# Patient Record
Sex: FEMALE | Race: BLACK OR AFRICAN AMERICAN | NOT HISPANIC OR LATINO | Employment: FULL TIME | URBAN - METROPOLITAN AREA
[De-identification: names, ages, dates, MRNs, and addresses within clinical notes are randomized per-mention and may not be internally consistent; named-entity substitution may affect disease eponyms.]

---

## 2017-07-02 ENCOUNTER — HOSPITAL ENCOUNTER (EMERGENCY)
Facility: OTHER | Age: 27
Discharge: HOME | End: 2017-07-02
Attending: EMERGENCY MEDICINE
Payer: COMMERCIAL

## 2017-07-02 VITALS
RESPIRATION RATE: 17 BRPM | HEART RATE: 74 BPM | DIASTOLIC BLOOD PRESSURE: 68 MMHG | HEIGHT: 62 IN | SYSTOLIC BLOOD PRESSURE: 112 MMHG | BODY MASS INDEX: 32.76 KG/M2 | WEIGHT: 178 LBS | OXYGEN SATURATION: 100 % | TEMPERATURE: 98 F

## 2017-07-02 DIAGNOSIS — N93.9 VAGINAL SPOTTING: ICD-10-CM

## 2017-07-02 DIAGNOSIS — R10.9 ABDOMINAL CRAMPING: ICD-10-CM

## 2017-07-02 DIAGNOSIS — O20.0 THREATENED MISCARRIAGE IN EARLY PREGNANCY: Primary | ICD-10-CM

## 2017-07-02 LAB
ABO + RH BLD: NORMAL
ALBUMIN SERPL BCP-MCNC: 3.9 G/DL
ALP SERPL-CCNC: 82 U/L
ALT SERPL W/O P-5'-P-CCNC: 12 U/L
ANION GAP SERPL CALC-SCNC: 10 MMOL/L
AST SERPL-CCNC: 13 U/L
B-HCG UR QL: POSITIVE
BACTERIA #/AREA URNS HPF: ABNORMAL /HPF
BACTERIA GENITAL QL WET PREP: ABNORMAL
BASOPHILS # BLD AUTO: 0.01 K/UL
BASOPHILS NFR BLD: 0.1 %
BILIRUB SERPL-MCNC: 0.3 MG/DL
BILIRUB UR QL STRIP: NEGATIVE
BLD GP AB SCN CELLS X3 SERPL QL: NORMAL
BUN SERPL-MCNC: 9 MG/DL
CALCIUM SERPL-MCNC: 9 MG/DL
CHLORIDE SERPL-SCNC: 105 MMOL/L
CLARITY UR: ABNORMAL
CLUE CELLS VAG QL WET PREP: ABNORMAL
CO2 SERPL-SCNC: 24 MMOL/L
COLOR UR: YELLOW
CREAT SERPL-MCNC: 0.7 MG/DL
CTP QC/QA: YES
DIFFERENTIAL METHOD: ABNORMAL
EOSINOPHIL # BLD AUTO: 0.1 K/UL
EOSINOPHIL NFR BLD: 0.6 %
ERYTHROCYTE [DISTWIDTH] IN BLOOD BY AUTOMATED COUNT: 12.5 %
EST. GFR  (AFRICAN AMERICAN): >60 ML/MIN/1.73 M^2
EST. GFR  (NON AFRICAN AMERICAN): >60 ML/MIN/1.73 M^2
FILAMENT FUNGI VAG WET PREP-#/AREA: ABNORMAL
GLUCOSE SERPL-MCNC: 72 MG/DL
GLUCOSE UR QL STRIP: NEGATIVE
HCG INTACT+B SERPL-ACNC: 2605 MIU/ML
HCT VFR BLD AUTO: 34.9 %
HGB BLD-MCNC: 11.7 G/DL
HGB UR QL STRIP: ABNORMAL
KETONES UR QL STRIP: NEGATIVE
LEUKOCYTE ESTERASE UR QL STRIP: ABNORMAL
LYMPHOCYTES # BLD AUTO: 2.8 K/UL
LYMPHOCYTES NFR BLD: 32.5 %
MCH RBC QN AUTO: 29.5 PG
MCHC RBC AUTO-ENTMCNC: 33.5 %
MCV RBC AUTO: 88 FL
MICROSCOPIC COMMENT: ABNORMAL
MONOCYTES # BLD AUTO: 0.4 K/UL
MONOCYTES NFR BLD: 4.5 %
NEUTROPHILS # BLD AUTO: 5.3 K/UL
NEUTROPHILS NFR BLD: 62.1 %
NITRITE UR QL STRIP: NEGATIVE
PH UR STRIP: 6 [PH] (ref 5–8)
PLATELET # BLD AUTO: 311 K/UL
PMV BLD AUTO: 9.6 FL
POTASSIUM SERPL-SCNC: 3.9 MMOL/L
PROT SERPL-MCNC: 7.6 G/DL
PROT UR QL STRIP: NEGATIVE
RBC # BLD AUTO: 3.96 M/UL
RBC #/AREA URNS HPF: 1 /HPF (ref 0–4)
SODIUM SERPL-SCNC: 139 MMOL/L
SP GR UR STRIP: 1.02 (ref 1–1.03)
SPECIMEN SOURCE: ABNORMAL
SQUAMOUS #/AREA URNS HPF: 5 /HPF
T VAGINALIS GENITAL QL WET PREP: ABNORMAL
URN SPEC COLLECT METH UR: ABNORMAL
UROBILINOGEN UR STRIP-ACNC: NEGATIVE EU/DL
WBC # BLD AUTO: 8.53 K/UL
WBC #/AREA URNS HPF: 7 /HPF (ref 0–5)
WBC #/AREA VAG WET PREP: ABNORMAL
YEAST GENITAL QL WET PREP: ABNORMAL

## 2017-07-02 PROCEDURE — 87591 N.GONORRHOEAE DNA AMP PROB: CPT

## 2017-07-02 PROCEDURE — 99284 EMERGENCY DEPT VISIT MOD MDM: CPT | Mod: 25

## 2017-07-02 PROCEDURE — 81025 URINE PREGNANCY TEST: CPT | Performed by: EMERGENCY MEDICINE

## 2017-07-02 PROCEDURE — 80053 COMPREHEN METABOLIC PANEL: CPT

## 2017-07-02 PROCEDURE — 85025 COMPLETE CBC W/AUTO DIFF WBC: CPT

## 2017-07-02 PROCEDURE — 86900 BLOOD TYPING SEROLOGIC ABO: CPT

## 2017-07-02 PROCEDURE — 87210 SMEAR WET MOUNT SALINE/INK: CPT

## 2017-07-02 PROCEDURE — 84702 CHORIONIC GONADOTROPIN TEST: CPT

## 2017-07-02 PROCEDURE — 81000 URINALYSIS NONAUTO W/SCOPE: CPT

## 2017-07-02 PROCEDURE — 86901 BLOOD TYPING SEROLOGIC RH(D): CPT

## 2017-07-02 NOTE — ED NOTES
Pt found out she was pregnant Monday home UPT. Started with vaginal spotting and abd cramping yesterday. Unknown gestation time. NAD. Will cont to monitor.

## 2017-07-03 LAB
C TRACH DNA SPEC QL NAA+PROBE: NOT DETECTED
N GONORRHOEA DNA SPEC QL NAA+PROBE: NOT DETECTED

## 2017-07-03 NOTE — ED PROVIDER NOTES
"Encounter Date: 2017       History     Chief Complaint   Patient presents with    Vaginal Bleeding     " I just found out I was pregnant on Monday by a home pregnancy test. I started havign a little bit of bright red spotting this morninng". Pt reports + intermittent bilateral lower abd "cramping" x 1 week. Denies N/V/d dysuira or vgainal discharge at this time     Patient is 27-year-old female  with last menstrual period  positive home pregnancy test 6 days prior to arrival.  She reports yesterday developing flights light pink vaginal spotting.  She also later developed mild abdominal cramping.  She admits that cramping has been persistent and somewhat worse today and is now constant. She denies worsening vaginal bleeding. She reports no passage of clots or tissue. She denies fever, chills, nausea, vomiting, chest pain or SOB. She is currently unaccompanied in the ER.           Review of patient's allergies indicates:  No Known Allergies  Past Medical History:   Diagnosis Date    Asthma      Past Surgical History:   Procedure Laterality Date     SECTION       Family History   Problem Relation Age of Onset    Diabetes Mother      Social History   Substance Use Topics    Smoking status: Never Smoker    Smokeless tobacco: Never Used    Alcohol use No     Review of Systems   Constitutional: Negative for fever.   HENT: Negative for sore throat.    Respiratory: Negative for shortness of breath.    Cardiovascular: Negative for chest pain.   Gastrointestinal: Negative for nausea and vomiting.        Abdominal cramping    Genitourinary: Positive for vaginal bleeding. Negative for dysuria.   Musculoskeletal: Negative for back pain.   Skin: Negative for rash.   Neurological: Negative for weakness.   Hematological: Does not bruise/bleed easily.       Physical Exam     Initial Vitals [17 1814]   BP Pulse Resp Temp SpO2   109/60 80 16 98.1 °F (36.7 °C) 100 %      MAP       76.33     "     Physical Exam    Nursing note and vitals reviewed.  Constitutional: She appears well-developed and well-nourished.   healthy appearing female in no acute distress or apparent pain but does appear anxious during interview and exam.  She makes good eye contact, speaks in clear full sentences and ambulates with ease.   HENT:   Head: Normocephalic and atraumatic.   Eyes: Conjunctivae and EOM are normal. Pupils are equal, round, and reactive to light. Right eye exhibits no discharge. Left eye exhibits no discharge. No scleral icterus.   Neck: Normal range of motion.   Cardiovascular: Normal rate, regular rhythm, normal heart sounds and intact distal pulses. Exam reveals no gallop and no friction rub.    No murmur heard.  Pulmonary/Chest: Breath sounds normal. She has no wheezes. She has no rhonchi. She has no rales.   Abdominal: Soft. Bowel sounds are normal. There is no tenderness. There is no rebound and no guarding.   Genitourinary:   Genitourinary Comments: Pelvic exam reveals scant thin white discharge with bleeding.  Cervical os is closed.  No cervical lesions appreciated.  No external lesions appreciated.  No uterine adnexal or CMT on bimanual exam.   Musculoskeletal: Normal range of motion. She exhibits no edema or tenderness.   Lymphadenopathy:     She has no cervical adenopathy.   Neurological: She is alert and oriented to person, place, and time. She has normal strength. No cranial nerve deficit or sensory deficit.   Skin: Skin is warm. Capillary refill takes less than 2 seconds. No rash and no abscess noted. No erythema.   Psychiatric: She has a normal mood and affect. Her behavior is normal. Thought content normal.         ED Course   Procedures  Labs Reviewed   POCT URINE PREGNANCY - Abnormal; Notable for the following:        Result Value    POC Preg Test, Ur Positive (*)     All other components within normal limits   C. TRACHOMATIS/N. GONORRHOEAE BY AMP DNA   URINALYSIS   URINALYSIS MICROSCOPIC   CBC  W/ AUTO DIFFERENTIAL   COMPREHENSIVE METABOLIC PANEL   HCG, QUANTITATIVE, PREGNANCY   VAGINAL SCREEN   POCT URINE PREGNANCY   TYPE & SCREEN        Imaging Results          US OB Less Than 14 Wks with Transvag(xpd (Final result)  Result time 07/02/17 20:23:50    Final result by Keeley Head MD (07/02/17 20:23:50)                 Impression:        Single intrauterine small gestational sac with an estimated age of 5 weeks and zero days. No yolk sac or fetal pole identified, which may be secondary to very early gestation. Followup with serial beta hCG and pelvic ultrasound as clinically warranted.      Electronically signed by: KEELEY HEAD MD, MD  Date:     07/02/17  Time:    20:23              Narrative:    Comparison: None.    Findings: Transabdominal and transvaginal pelvic ultrasound performed.  Beta-hCG not available for review at time of interpretation. The uterus measures 10.4 cm in length and 4.7 x 5.5 cm in transverse dimensions.  There is a single hypoechoic structure with decidual reaction identified within the upper uterine cavity suggestive of a gestational sac with a mean diameter of 5 mm, consistent with 5 weeks and zero days. No yolk sac or fetal pole identified. No extrauterine gestational sac seen. No discrete uterine fibroids identified. Small nabothian cysts noted. Right ovary was not identified. No right adnexal mass seen.  The left ovary measures 2.4 x 1.9 x 1.9 cm with intact arterial and venous flow. No significant amount of free fluid identified.                                 Medical Decision Making:   ED Management:  Urgent evaluation a 27-year-old female who presents with complaints of spotting and cramping in early pregnancy. She is afebrile, nontoxic appearing, hemodynamically stable.  Physical exam reveals benign abdomen with normal cardiopulmonary auscultation no focal neuro deficits.  Pelvic exam reveals closed os with no bleeding and no uterine, adnexal or cervical motion  tenderness to palpation on bimanual exam.diagnostic labs reveas lno leukocytosis, non acute anemia at 11 and 34, no acute electrolyte abnormalities. Beta hCG is 2/6/05. Blood type is A positive. Urinalysis unremarkable for infection. Transvaginal ultrasound reveals single intrauterine gestation estimated approximately 5 weeks 0 days with no yolk sac or fetal pole identified.  This is likely second to very early pregnancy.  Serial beta hCGs and pelvic ultrasound recommended is clinically warranted in the outpatient setting.  Patient is felt safe for discharge with instruction to follow-up with OB/GYN in one to 2 days for symptom recheck.  She is educated on signs and symptoms of worsening and is told if these present she should return to the ER.  She is placed on bleeding precautions and pelvic rest.  She verbalizes understanding and is amenable to plan.  Case is discussed with attending who agrees with plan.                   ED Course     Clinical Impression:   The primary encounter diagnosis was Threatened miscarriage in early pregnancy. Diagnoses of Vaginal spotting and Abdominal cramping were also pertinent to this visit.                           Alecia Lewis PA-C  07/02/17 4049

## 2017-07-03 NOTE — ED NOTES
Pt rounding complete. Pain 6/10. Patient states she took Tylenol earlier today (this morning) without relief. Restroom and comfort needs addressed. Pt updated on plan of care. Will continue to monitor.

## 2017-07-03 NOTE — ED NOTES
Rounding on the patient has been done. Pt is AAOx 4, no acute distress noted, respirations even, unlabored, vital signs stable , skin warm and dry. Pain was assessed and is currently a pain level 6/10. Comfort positioning and restroom needs were addressed. She denies any needs. She complains of pelvic cramping. The patient has been updated on the plan of care and current status. The call bell is within reach with instructions of usage for any additional patient needs. The patient is resting comfortably in stretcher with wheels locked and bed in lowest position. Will continue to monitor.

## 2017-07-05 ENCOUNTER — OFFICE VISIT (OUTPATIENT)
Dept: OBSTETRICS AND GYNECOLOGY | Facility: CLINIC | Age: 27
End: 2017-07-05
Payer: COMMERCIAL

## 2017-07-05 VITALS
WEIGHT: 185.44 LBS | HEIGHT: 62 IN | BODY MASS INDEX: 34.12 KG/M2 | DIASTOLIC BLOOD PRESSURE: 78 MMHG | SYSTOLIC BLOOD PRESSURE: 102 MMHG

## 2017-07-05 DIAGNOSIS — Z98.891 HISTORY OF C-SECTION: ICD-10-CM

## 2017-07-05 DIAGNOSIS — Z34.80 SUPERVISION OF OTHER NORMAL PREGNANCY: Primary | ICD-10-CM

## 2017-07-05 PROCEDURE — 99999 PR PBB SHADOW E&M-EST. PATIENT-LVL II: CPT | Mod: PBBFAC,,, | Performed by: OBSTETRICS & GYNECOLOGY

## 2017-07-05 PROCEDURE — 0502F SUBSEQUENT PRENATAL CARE: CPT | Mod: S$GLB,,, | Performed by: OBSTETRICS & GYNECOLOGY

## 2017-07-13 RX ORDER — ONDANSETRON 4 MG/1
4 TABLET, ORALLY DISINTEGRATING ORAL EVERY 6 HOURS PRN
Qty: 30 TABLET | Refills: 2 | Status: SHIPPED | OUTPATIENT
Start: 2017-07-13 | End: 2017-08-18

## 2017-07-13 NOTE — TELEPHONE ENCOUNTER
Patient is early pregnant, has complaints of nausea and vomiting. Requesting something to help with the nausea.    Rx of Zofran has been called in. Please sign order.

## 2017-07-13 NOTE — TELEPHONE ENCOUNTER
----- Message from Aviva Pinto sent at 7/13/2017  1:45 PM CDT -----  Contact: [phone]  Patient requesting to speak with you regarding nausea medication. Please advise.

## 2017-07-31 ENCOUNTER — LAB VISIT (OUTPATIENT)
Dept: LAB | Facility: HOSPITAL | Age: 27
End: 2017-07-31
Attending: OBSTETRICS & GYNECOLOGY
Payer: COMMERCIAL

## 2017-07-31 ENCOUNTER — ROUTINE PRENATAL (OUTPATIENT)
Dept: OBSTETRICS AND GYNECOLOGY | Facility: CLINIC | Age: 27
End: 2017-07-31
Payer: COMMERCIAL

## 2017-07-31 ENCOUNTER — OFFICE VISIT (OUTPATIENT)
Dept: OBSTETRICS AND GYNECOLOGY | Facility: CLINIC | Age: 27
End: 2017-07-31
Payer: COMMERCIAL

## 2017-07-31 VITALS — WEIGHT: 186.5 LBS | DIASTOLIC BLOOD PRESSURE: 60 MMHG | BODY MASS INDEX: 34.11 KG/M2 | SYSTOLIC BLOOD PRESSURE: 100 MMHG

## 2017-07-31 DIAGNOSIS — Z34.80 SUPERVISION OF OTHER NORMAL PREGNANCY: ICD-10-CM

## 2017-07-31 DIAGNOSIS — O21.9 NAUSEA AND VOMITING IN PREGNANCY: ICD-10-CM

## 2017-07-31 DIAGNOSIS — Z36.87 UNSURE OF LMP (LAST MENSTRUAL PERIOD) AS REASON FOR ULTRASOUND SCAN: Primary | ICD-10-CM

## 2017-07-31 DIAGNOSIS — Z98.891 HISTORY OF C-SECTION: ICD-10-CM

## 2017-07-31 LAB
ABO + RH BLD: NORMAL
BASOPHILS # BLD AUTO: 0.02 K/UL
BASOPHILS NFR BLD: 0.2 %
BLD GP AB SCN CELLS X3 SERPL QL: NORMAL
DIFFERENTIAL METHOD: ABNORMAL
EOSINOPHIL # BLD AUTO: 0.1 K/UL
EOSINOPHIL NFR BLD: 0.6 %
ERYTHROCYTE [DISTWIDTH] IN BLOOD BY AUTOMATED COUNT: 12.9 %
HCT VFR BLD AUTO: 34.6 %
HGB BLD-MCNC: 11.5 G/DL
HGB S BLD QL SOLY: NEGATIVE
LYMPHOCYTES # BLD AUTO: 1.7 K/UL
LYMPHOCYTES NFR BLD: 17.6 %
MCH RBC QN AUTO: 28.7 PG
MCHC RBC AUTO-ENTMCNC: 33.2 G/DL
MCV RBC AUTO: 86 FL
MONOCYTES # BLD AUTO: 0.6 K/UL
MONOCYTES NFR BLD: 5.7 %
NEUTROPHILS # BLD AUTO: 7.4 K/UL
NEUTROPHILS NFR BLD: 75.7 %
PLATELET # BLD AUTO: 304 K/UL
PMV BLD AUTO: 9.5 FL
RBC # BLD AUTO: 4.01 M/UL
WBC # BLD AUTO: 9.78 K/UL

## 2017-07-31 PROCEDURE — 86592 SYPHILIS TEST NON-TREP QUAL: CPT

## 2017-07-31 PROCEDURE — 87340 HEPATITIS B SURFACE AG IA: CPT

## 2017-07-31 PROCEDURE — 86900 BLOOD TYPING SEROLOGIC ABO: CPT

## 2017-07-31 PROCEDURE — 36415 COLL VENOUS BLD VENIPUNCTURE: CPT

## 2017-07-31 PROCEDURE — 85025 COMPLETE CBC W/AUTO DIFF WBC: CPT

## 2017-07-31 PROCEDURE — 76801 OB US < 14 WKS SINGLE FETUS: CPT | Mod: S$GLB,,, | Performed by: OBSTETRICS & GYNECOLOGY

## 2017-07-31 PROCEDURE — 85660 RBC SICKLE CELL TEST: CPT

## 2017-07-31 PROCEDURE — 86703 HIV-1/HIV-2 1 RESULT ANTBDY: CPT

## 2017-07-31 PROCEDURE — 99999 PR PBB SHADOW E&M-EST. PATIENT-LVL II: CPT | Mod: PBBFAC,,, | Performed by: OBSTETRICS & GYNECOLOGY

## 2017-07-31 PROCEDURE — 81220 CFTR GENE COM VARIANTS: CPT

## 2017-07-31 PROCEDURE — 0502F SUBSEQUENT PRENATAL CARE: CPT | Mod: S$GLB,,, | Performed by: OBSTETRICS & GYNECOLOGY

## 2017-07-31 PROCEDURE — 86850 RBC ANTIBODY SCREEN: CPT

## 2017-07-31 PROCEDURE — 86762 RUBELLA ANTIBODY: CPT

## 2017-07-31 RX ORDER — PROMETHAZINE HYDROCHLORIDE 25 MG/1
25 SUPPOSITORY RECTAL EVERY 6 HOURS PRN
Qty: 12 SUPPOSITORY | Refills: 3 | Status: SHIPPED | OUTPATIENT
Start: 2017-07-31 | End: 2017-08-18

## 2017-07-31 NOTE — PROGRESS NOTES
U/S confirms viability; having cramping but not bleeding; n&v excessive---note for work; diet recommendations; phenergan suppository (failed zofran)  Labs today.

## 2017-08-01 LAB
HBV SURFACE AG SERPL QL IA: NEGATIVE
HIV 1+2 AB+HIV1 P24 AG SERPL QL IA: NEGATIVE
RPR SER QL: NORMAL
RUBV IGG SER-ACNC: 53.3 IU/ML
RUBV IGG SER-IMP: REACTIVE

## 2017-08-02 LAB — CFTR MUT ANL BLD/T: NORMAL

## 2017-08-04 ENCOUNTER — TELEPHONE (OUTPATIENT)
Dept: OBSTETRICS AND GYNECOLOGY | Facility: CLINIC | Age: 27
End: 2017-08-04

## 2017-08-10 NOTE — TELEPHONE ENCOUNTER
----- Message from Hayley Crespo sent at 8/10/2017  9:40 AM CDT -----  Contact: self, [phone]  Patient requests her nausea medication refill sent to pharmacy. Please advise.

## 2017-08-10 NOTE — TELEPHONE ENCOUNTER
Patient has complaints of nausea. Zofran did not help and she prefers not to use the suppository.   Rx of Promethazine tablet called in to the pharmacy.    Please sign order.

## 2017-08-11 RX ORDER — PROMETHAZINE HYDROCHLORIDE 25 MG/1
25 TABLET ORAL EVERY 6 HOURS PRN
Qty: 60 TABLET | Refills: 1 | Status: SHIPPED | OUTPATIENT
Start: 2017-08-11 | End: 2017-08-18

## 2017-08-14 ENCOUNTER — TELEPHONE (OUTPATIENT)
Dept: OBSTETRICS AND GYNECOLOGY | Facility: CLINIC | Age: 27
End: 2017-08-14

## 2017-08-14 NOTE — TELEPHONE ENCOUNTER
[place] with [employer] called stating a form was faxed for completion stating why the patient is unable to due to pregnancy  Also need prenatal office visit, labs or ultrasound done to be faxed with the medical form  Fax [phone]

## 2017-08-14 NOTE — TELEPHONE ENCOUNTER
----- Message from Michelle Franco sent at 8/14/2017  7:48 AM CDT -----  Contact: [phone] / self   Patient requesting to speak with you regarding her nausea medicine. States the medicine she was given is making her throw up and she wants to know if she should try the zofran instead. Please advise.

## 2017-08-14 NOTE — TELEPHONE ENCOUNTER
----- Message from Simi Stewart sent at 8/14/2017 12:56 PM CDT -----  Jurgen with [employer] called.   No. [phone]    Needs medical information to support the claim.   Fax no. [phone]

## 2017-08-14 NOTE — TELEPHONE ENCOUNTER
Patient informed if she is unable to tolerate the phenergan, yes okay to try the zofran.  Patient is to call with any further questions or problems

## 2017-08-15 ENCOUNTER — HOSPITAL ENCOUNTER (EMERGENCY)
Facility: OTHER | Age: 27
Discharge: HOME | End: 2017-08-15
Attending: EMERGENCY MEDICINE
Payer: COMMERCIAL

## 2017-08-15 VITALS
DIASTOLIC BLOOD PRESSURE: 53 MMHG | WEIGHT: 185 LBS | HEART RATE: 76 BPM | SYSTOLIC BLOOD PRESSURE: 107 MMHG | HEIGHT: 62 IN | BODY MASS INDEX: 34.04 KG/M2 | TEMPERATURE: 98 F | RESPIRATION RATE: 18 BRPM | OXYGEN SATURATION: 100 %

## 2017-08-15 DIAGNOSIS — O21.9 NAUSEA AND VOMITING IN PREGNANCY: Primary | ICD-10-CM

## 2017-08-15 DIAGNOSIS — N30.00 ACUTE CYSTITIS WITHOUT HEMATURIA: ICD-10-CM

## 2017-08-15 DIAGNOSIS — R80.9 PROTEINURIA, UNSPECIFIED: ICD-10-CM

## 2017-08-15 LAB
ANION GAP SERPL CALC-SCNC: 11 MMOL/L
B-HCG UR QL: POSITIVE
BACTERIA #/AREA URNS HPF: ABNORMAL /HPF
BASOPHILS # BLD AUTO: 0.01 K/UL
BASOPHILS NFR BLD: 0.1 %
BILIRUB UR QL STRIP: NEGATIVE
BUN SERPL-MCNC: 9 MG/DL
CALCIUM SERPL-MCNC: 9.3 MG/DL
CHLORIDE SERPL-SCNC: 103 MMOL/L
CLARITY UR: ABNORMAL
CO2 SERPL-SCNC: 22 MMOL/L
COLOR UR: YELLOW
CREAT SERPL-MCNC: 0.6 MG/DL
CTP QC/QA: YES
DIFFERENTIAL METHOD: ABNORMAL
EOSINOPHIL # BLD AUTO: 0 K/UL
EOSINOPHIL NFR BLD: 0.2 %
ERYTHROCYTE [DISTWIDTH] IN BLOOD BY AUTOMATED COUNT: 13.1 %
EST. GFR  (AFRICAN AMERICAN): >60 ML/MIN/1.73 M^2
EST. GFR  (NON AFRICAN AMERICAN): >60 ML/MIN/1.73 M^2
GLUCOSE SERPL-MCNC: 79 MG/DL
GLUCOSE UR QL STRIP: NEGATIVE
HCT VFR BLD AUTO: 33.6 %
HGB BLD-MCNC: 11.4 G/DL
HGB UR QL STRIP: ABNORMAL
HYALINE CASTS #/AREA URNS LPF: 1 /LPF
KETONES UR QL STRIP: ABNORMAL
LEUKOCYTE ESTERASE UR QL STRIP: ABNORMAL
LYMPHOCYTES # BLD AUTO: 1.5 K/UL
LYMPHOCYTES NFR BLD: 17 %
MCH RBC QN AUTO: 29.5 PG
MCHC RBC AUTO-ENTMCNC: 33.9 G/DL
MCV RBC AUTO: 87 FL
MICROSCOPIC COMMENT: ABNORMAL
MONOCYTES # BLD AUTO: 0.3 K/UL
MONOCYTES NFR BLD: 3.6 %
NEUTROPHILS # BLD AUTO: 6.8 K/UL
NEUTROPHILS NFR BLD: 79 %
NITRITE UR QL STRIP: NEGATIVE
NON-SQ EPI CELLS #/AREA URNS HPF: 4 /HPF
PH UR STRIP: 7 [PH] (ref 5–8)
PLATELET # BLD AUTO: 282 K/UL
PMV BLD AUTO: 9.4 FL
POTASSIUM SERPL-SCNC: 4.1 MMOL/L
PROT UR QL STRIP: ABNORMAL
RBC # BLD AUTO: 3.87 M/UL
RBC #/AREA URNS HPF: 5 /HPF (ref 0–4)
SODIUM SERPL-SCNC: 136 MMOL/L
SP GR UR STRIP: 1.02 (ref 1–1.03)
SQUAMOUS #/AREA URNS HPF: 2 /HPF
URN SPEC COLLECT METH UR: ABNORMAL
UROBILINOGEN UR STRIP-ACNC: 1 EU/DL
WBC # BLD AUTO: 8.58 K/UL
WBC #/AREA URNS HPF: 25 /HPF (ref 0–5)
WBC CLUMPS URNS QL MICRO: ABNORMAL

## 2017-08-15 PROCEDURE — 80048 BASIC METABOLIC PNL TOTAL CA: CPT

## 2017-08-15 PROCEDURE — 96361 HYDRATE IV INFUSION ADD-ON: CPT

## 2017-08-15 PROCEDURE — 87086 URINE CULTURE/COLONY COUNT: CPT

## 2017-08-15 PROCEDURE — 99284 EMERGENCY DEPT VISIT MOD MDM: CPT | Mod: 25

## 2017-08-15 PROCEDURE — 81025 URINE PREGNANCY TEST: CPT | Performed by: EMERGENCY MEDICINE

## 2017-08-15 PROCEDURE — 87077 CULTURE AEROBIC IDENTIFY: CPT

## 2017-08-15 PROCEDURE — 87186 SC STD MICRODIL/AGAR DIL: CPT

## 2017-08-15 PROCEDURE — 87088 URINE BACTERIA CULTURE: CPT

## 2017-08-15 PROCEDURE — 85025 COMPLETE CBC W/AUTO DIFF WBC: CPT

## 2017-08-15 PROCEDURE — 81000 URINALYSIS NONAUTO W/SCOPE: CPT

## 2017-08-15 PROCEDURE — 96374 THER/PROPH/DIAG INJ IV PUSH: CPT

## 2017-08-15 PROCEDURE — 63600175 PHARM REV CODE 636 W HCPCS: Performed by: EMERGENCY MEDICINE

## 2017-08-15 PROCEDURE — 25000003 PHARM REV CODE 250: Performed by: EMERGENCY MEDICINE

## 2017-08-15 RX ORDER — NITROFURANTOIN 25; 75 MG/1; MG/1
100 CAPSULE ORAL 2 TIMES DAILY
Qty: 10 CAPSULE | Refills: 0 | Status: SHIPPED | OUTPATIENT
Start: 2017-08-15 | End: 2017-08-20

## 2017-08-15 RX ORDER — ONDANSETRON 2 MG/ML
4 INJECTION INTRAMUSCULAR; INTRAVENOUS
Status: COMPLETED | OUTPATIENT
Start: 2017-08-15 | End: 2017-08-15

## 2017-08-15 RX ORDER — DOXYLAMINE SUCCINATE AND PYRIDOXINE HYDROCHLORIDE, DELAYED RELEASE TABLETS 10 MG/10 MG 10; 10 MG/1; MG/1
1 TABLET, DELAYED RELEASE ORAL NIGHTLY PRN
Qty: 10 TABLET | Refills: 0 | Status: SHIPPED | OUTPATIENT
Start: 2017-08-15 | End: 2019-05-22

## 2017-08-15 RX ADMIN — SODIUM CHLORIDE 1000 ML: 0.9 INJECTION, SOLUTION INTRAVENOUS at 12:08

## 2017-08-15 RX ADMIN — ONDANSETRON 4 MG: 2 INJECTION INTRAMUSCULAR; INTRAVENOUS at 12:08

## 2017-08-15 RX ADMIN — SODIUM CHLORIDE 1000 ML: 0.9 INJECTION, SOLUTION INTRAVENOUS at 01:08

## 2017-08-15 NOTE — ED PROVIDER NOTES
"Encounter Date: 8/15/2017    SCRIBE #1 NOTE: I, Johanny Charles, am scribing for, and in the presence of,  Dr. Luque. I have scribed the entire note.       History     Chief Complaint   Patient presents with    Hyperemesis Gravidarum     pt is 11 week pg.  pt vomiting even with meds.      Time seen by provider: 12:16 PM    This is a 27 y.o. pregnant female,  at approximately 11 weeks gestation, who presents with complaint of hyperemesis x 3 days. She notes that she has been unable to keep any food down for at least a day. Pt last threw up at 1.5hrs PTA. She has vomited 4 times this morning. She had similar symptoms with her last pregnancy and was treated in an ED with IV fluids. She was not admitted into a hospital. Pt was put on Phenergan last week for nausea without vomiting and notes onset of vomiting since. She states that the Rx makes her feel worse and "drowzy." She reports to alleviating factors. She denies any dysuria, urinary frequency, urinary urgency, vaginal bleeding, vaginal discharge, abdominal pain, diarrhea, weakness, fatigue, and back pain. Her OB/GYN is Dr. Hector Su. She does not smoke, drink, or use drugs. She has NKDA.      The history is provided by the patient.     Review of patient's allergies indicates:  No Known Allergies  Past Medical History:   Diagnosis Date    Asthma      Past Surgical History:   Procedure Laterality Date     SECTION       Family History   Problem Relation Age of Onset    Diabetes Mother      Social History   Substance Use Topics    Smoking status: Never Smoker    Smokeless tobacco: Never Used    Alcohol use No     Review of Systems   Constitutional: Negative for chills, diaphoresis, fatigue and fever.   HENT: Negative for ear pain and sore throat.    Eyes: Negative for pain and visual disturbance.   Respiratory: Negative for cough, shortness of breath and wheezing.    Cardiovascular: Negative for chest pain.   Gastrointestinal: Positive for " nausea and vomiting. Negative for abdominal pain and diarrhea.   Genitourinary: Negative for decreased urine volume, dysuria, frequency, hematuria, urgency, vaginal bleeding and vaginal discharge.   Musculoskeletal: Negative for back pain and neck pain.   Skin: Negative for color change, pallor, rash and wound.   Neurological: Negative for dizziness, weakness, light-headedness, numbness and headaches.   Hematological: Does not bruise/bleed easily.   Psychiatric/Behavioral: Negative for behavioral problems and confusion.       Physical Exam     Initial Vitals [08/15/17 1053]   BP Pulse Resp Temp SpO2   (!) 109/54 97 18 98.3 °F (36.8 °C) 99 %      MAP       72.33         Physical Exam    Nursing note and vitals reviewed.  Constitutional: She appears well-developed and well-nourished. She is not diaphoretic. No distress.   HENT:   Head: Normocephalic and atraumatic.   Right Ear: External ear normal.   Left Ear: External ear normal.   Oropharynx is clear and intact.  Moist mucous membranes.   Eyes: Conjunctivae and EOM are normal. Pupils are equal, round, and reactive to light. Right eye exhibits no discharge. Left eye exhibits no discharge.   Conjunctiva are pink, clear, and intact.   Neck: Normal range of motion. Neck supple.   Cardiovascular: Normal rate, regular rhythm and normal heart sounds.   Normal S1, S2. No murmurs, no rubs, no gallops.    Pulmonary/Chest: Breath sounds normal. No respiratory distress. She has no wheezes. She has no rhonchi. She has no rales.   Clear to ascultation bilaterally.   Abdominal: Soft. Bowel sounds are normal. She exhibits no distension. There is no tenderness. There is no rebound and no guarding.   No flank tenderness. No audible bruits.   Musculoskeletal: Normal range of motion. She exhibits no edema or tenderness.   Lymphadenopathy:     She has no cervical adenopathy.   Neurological: She is alert and oriented to person, place, and time. She has normal strength. No sensory  deficit.   Skin: Skin is warm and dry. No rash noted. No erythema.   Warm and dry. No rashes, no lesions, or skin tenting.   Psychiatric: She has a normal mood and affect. Her behavior is normal.         ED Course   Procedures  Labs Reviewed   CBC W/ AUTO DIFFERENTIAL - Abnormal; Notable for the following:        Result Value    RBC 3.87 (*)     Hemoglobin 11.4 (*)     Hematocrit 33.6 (*)     Gran% 79.0 (*)     Lymph% 17.0 (*)     Mono% 3.6 (*)     All other components within normal limits   BASIC METABOLIC PANEL - Abnormal; Notable for the following:     CO2 22 (*)     All other components within normal limits   URINALYSIS - Abnormal; Notable for the following:     Appearance, UA Hazy (*)     Protein, UA Trace (*)     Ketones, UA 1+ (*)     Occult Blood UA Trace (*)     Leukocytes, UA 1+ (*)     All other components within normal limits   URINALYSIS MICROSCOPIC - Abnormal; Notable for the following:     RBC, UA 5 (*)     WBC, UA 25 (*)     WBC Clumps, UA Occasional (*)     Bacteria, UA Moderate (*)     Non-Squam Epith 4 (*)     All other components within normal limits   POCT URINE PREGNANCY - Abnormal; Notable for the following:     POC Preg Test, Ur Positive (*)     All other components within normal limits   CULTURE, URINE   CULTURE, URINE             Medical Decision Making:   Clinical Tests:   Lab Tests: Ordered and Reviewed            Scribe Attestation:   Scribe #1: I performed the above scribed service and the documentation accurately describes the services I performed. I attest to the accuracy of the note.    Attending Attestation:           Physician Attestation for Scribe:  Physician Attestation Statement for Scribe #1: I, Dr. Luque, reviewed documentation, as scribed by Johanny Charles in my presence, and it is both accurate and complete.         Attending ED Notes:   Emergent evaluation of 27-year-old female with nausea and vomiting with pregnancy.  Patient is afebrile, nontoxic-appearing with  stable vital signs.  There is no elevation of white blood cell count.  H&H is 11.4 and 33.6.  No acute findings on BMP.  UPT is positive.  Urinary analysis reveals urinary tract infection.  Abdominal exam is benign.  No flank tenderness to palpation.  On reevaluation patient is tolerating by mouth intake without complication.  The patient is extensively counseled on her diagnosis and treatment including all diagnostic, laboratory and physical exam findings.  The patient is discharged in good condition and directed to follow-up with her PCP in the next 24-48 hours.          ED Course     Clinical Impression:     1. Nausea and vomiting in pregnancy    2. Acute cystitis without hematuria    3. Proteinuria, unspecified                                 Severino Yusuf MD  08/17/17 1165

## 2017-08-15 NOTE — ED TRIAGE NOTES
Pt reports that she is 11 weeks, c/o vomiting X 4 days with vaginal cramping. Pt c/o that she is unable to hold down fluids.

## 2017-08-15 NOTE — ED NOTES
Patient Identifiers for Siobhan Chappell checked and correct  LOC: The patient is awake, alert and aware of environment with an appropriate affect, the patient is oriented x 3 and speaking appropriate.  APPEARANCE: Patient resting comfortably and in no acute distress. The patient is clean and well groomed. The patient's clothing is properly fastened.  SKIN: The skin is warm and dry. The patient has normal skin turgor and moist mucus membranes. No rashes or lesions upon observation. Skin Intact , no breakdown noted.  Musculoskeletal :  Normal range of motion noted. Moves all extremities well.  RESPIRATORY: Airway is open and patent, respirations are spontaneous, patient has a normal effort and rate.   CARDIAC: Patient has a normal rate and rhythm, capillary refill < 3 seconds.   ABDOMEN: Soft and non tender to palpation, no distention observed. Bowels Sounds are WNL all quads.  PULSES: 2+ radial  pulses, symmetrical.      Will continue to monitor

## 2017-08-16 ENCOUNTER — TELEPHONE (OUTPATIENT)
Dept: OBSTETRICS AND GYNECOLOGY | Facility: CLINIC | Age: 27
End: 2017-08-16

## 2017-08-16 NOTE — TELEPHONE ENCOUNTER
Patient informed that paperwork was faxed back over today. All questions answered and patient verbalized.

## 2017-08-16 NOTE — TELEPHONE ENCOUNTER
----- Message from Aviva Pinto sent at 8/16/2017  3:34 PM CDT -----  Contact: [phone]/self  Patient requesting to speak with you regarding FMLA paperwork. Please advise.

## 2017-08-17 LAB — BACTERIA UR CULT: NORMAL

## 2017-08-18 ENCOUNTER — NURSE TRIAGE (OUTPATIENT)
Dept: ADMINISTRATIVE | Facility: CLINIC | Age: 27
End: 2017-08-18

## 2017-08-18 ENCOUNTER — HOSPITAL ENCOUNTER (EMERGENCY)
Facility: OTHER | Age: 27
Discharge: HOME | End: 2017-08-19
Attending: EMERGENCY MEDICINE
Payer: COMMERCIAL

## 2017-08-18 DIAGNOSIS — O21.9 NAUSEA AND VOMITING DURING PREGNANCY: Primary | ICD-10-CM

## 2017-08-18 DIAGNOSIS — R10.32 LLQ PAIN: ICD-10-CM

## 2017-08-18 LAB
BACTERIA #/AREA URNS HPF: ABNORMAL /HPF
BACTERIA GENITAL QL WET PREP: ABNORMAL
BILIRUB UR QL STRIP: NEGATIVE
CLARITY UR: CLEAR
CLUE CELLS VAG QL WET PREP: ABNORMAL
COLOR UR: YELLOW
FILAMENT FUNGI VAG WET PREP-#/AREA: ABNORMAL
GLUCOSE UR QL STRIP: NEGATIVE
HGB UR QL STRIP: NEGATIVE
HYALINE CASTS #/AREA URNS LPF: 1 /LPF
KETONES UR QL STRIP: NEGATIVE
LEUKOCYTE ESTERASE UR QL STRIP: NEGATIVE
MICROSCOPIC COMMENT: ABNORMAL
NITRITE UR QL STRIP: NEGATIVE
PH UR STRIP: 6 [PH] (ref 5–8)
PROT UR QL STRIP: ABNORMAL
RBC #/AREA URNS HPF: 0 /HPF (ref 0–4)
SP GR UR STRIP: 1.02 (ref 1–1.03)
SPECIMEN SOURCE: ABNORMAL
SQUAMOUS #/AREA URNS HPF: 8 /HPF
T VAGINALIS GENITAL QL WET PREP: ABNORMAL
URN SPEC COLLECT METH UR: ABNORMAL
UROBILINOGEN UR STRIP-ACNC: ABNORMAL EU/DL
WBC #/AREA URNS HPF: 7 /HPF (ref 0–5)
WBC #/AREA VAG WET PREP: ABNORMAL
YEAST GENITAL QL WET PREP: ABNORMAL

## 2017-08-18 PROCEDURE — 25000003 PHARM REV CODE 250: Performed by: EMERGENCY MEDICINE

## 2017-08-18 PROCEDURE — 63600175 PHARM REV CODE 636 W HCPCS: Performed by: EMERGENCY MEDICINE

## 2017-08-18 PROCEDURE — 87591 N.GONORRHOEAE DNA AMP PROB: CPT

## 2017-08-18 PROCEDURE — 81000 URINALYSIS NONAUTO W/SCOPE: CPT

## 2017-08-18 PROCEDURE — 87210 SMEAR WET MOUNT SALINE/INK: CPT

## 2017-08-18 PROCEDURE — 99284 EMERGENCY DEPT VISIT MOD MDM: CPT | Mod: 25

## 2017-08-18 PROCEDURE — 96361 HYDRATE IV INFUSION ADD-ON: CPT

## 2017-08-18 PROCEDURE — 96374 THER/PROPH/DIAG INJ IV PUSH: CPT

## 2017-08-18 RX ORDER — ONDANSETRON 2 MG/ML
4 INJECTION INTRAMUSCULAR; INTRAVENOUS
Status: COMPLETED | OUTPATIENT
Start: 2017-08-18 | End: 2017-08-18

## 2017-08-18 RX ORDER — SODIUM CHLORIDE 9 MG/ML
1000 INJECTION, SOLUTION INTRAVENOUS
Status: COMPLETED | OUTPATIENT
Start: 2017-08-18 | End: 2017-08-19

## 2017-08-18 RX ADMIN — ONDANSETRON 4 MG: 2 INJECTION INTRAMUSCULAR; INTRAVENOUS at 11:08

## 2017-08-18 RX ADMIN — SODIUM CHLORIDE 1000 ML: 0.9 INJECTION, SOLUTION INTRAVENOUS at 11:08

## 2017-08-19 VITALS
HEART RATE: 77 BPM | DIASTOLIC BLOOD PRESSURE: 51 MMHG | SYSTOLIC BLOOD PRESSURE: 104 MMHG | BODY MASS INDEX: 34.4 KG/M2 | WEIGHT: 186.94 LBS | TEMPERATURE: 98 F | HEIGHT: 62 IN | RESPIRATION RATE: 16 BRPM | OXYGEN SATURATION: 100 %

## 2017-08-19 LAB
C TRACH DNA SPEC QL NAA+PROBE: NOT DETECTED
N GONORRHOEA DNA SPEC QL NAA+PROBE: NOT DETECTED

## 2017-08-19 RX ORDER — ONDANSETRON 8 MG/1
8 TABLET, ORALLY DISINTEGRATING ORAL EVERY 6 HOURS PRN
Qty: 12 TABLET | Refills: 0 | Status: SHIPPED | OUTPATIENT
Start: 2017-08-19 | End: 2017-11-30 | Stop reason: SDUPTHER

## 2017-08-19 NOTE — ED PROVIDER NOTES
Encounter Date: 2017    SCRIBE #1 NOTE: I, Loren Bonner, am scribing for, and in the presence of,  Dr. Marquez. I have scribed the entire note.       History     Chief Complaint   Patient presents with    Emesis During Pregnancy     w/ LLQ abd pain since 1930 hrs this PM., 12 wks gestation, no vaginal bleeding     Time seen by provider: 10:45 PM    This is a 27 y.o.  female who is 12 weeks gestation who presents with complaint of vomiting. She reports onset of symptoms was about 3 hrs ago. The patient states she has been nauseated since 10 weeks gestation intermittently. She notes she was doing well for the last 3 days until tonight when the vomiting began. The patient notes associated left lower abdominal pain but denies any vaginal bleeding, vaginal discharge, urinary symptoms, fever or chills. She describes the pain as throbbing. The patient notes she typically has abdominal pain but the pain is usually described as cramping. She states she has been using Diclegis with minimal relief of symptoms. The patient reports she experienced similar symptoms with previous pregnancy      The history is provided by the patient.     Review of patient's allergies indicates:  No Known Allergies  Past Medical History:   Diagnosis Date    Asthma      Past Surgical History:   Procedure Laterality Date     SECTION       Family History   Problem Relation Age of Onset    Diabetes Mother      Social History   Substance Use Topics    Smoking status: Never Smoker    Smokeless tobacco: Never Used    Alcohol use No     Review of Systems   Constitutional: Negative for chills and fever.   HENT: Negative for congestion and sore throat.    Eyes: Negative for redness and visual disturbance.   Respiratory: Negative for cough and shortness of breath.    Cardiovascular: Negative for chest pain and palpitations.   Gastrointestinal: Positive for abdominal pain, nausea and vomiting. Negative for diarrhea.   Genitourinary:  Negative for dysuria.   Musculoskeletal: Negative for back pain.   Skin: Negative for rash.   Neurological: Negative for weakness and headaches.   Psychiatric/Behavioral: Negative for confusion.       Physical Exam     Initial Vitals [08/18/17 2235]   BP Pulse Resp Temp SpO2   (!) 116/56 89 18 98.3 °F (36.8 °C) 97 %      MAP       76         Physical Exam    Nursing note and vitals reviewed.  Constitutional: She appears well-developed and well-nourished. She is not diaphoretic. No distress.   HENT:   Head: Normocephalic and atraumatic.   Right Ear: External ear normal.   Left Ear: External ear normal.   Mouth/Throat: Oropharynx is clear and moist.   Eyes: Conjunctivae and EOM are normal.   Neck: Normal range of motion. Neck supple.   Cardiovascular: Normal rate, regular rhythm and normal heart sounds. Exam reveals no gallop and no friction rub.    No murmur heard.  Pulmonary/Chest: Breath sounds normal. She has no wheezes. She has no rhonchi. She has no rales.   Abdominal: Soft. Bowel sounds are normal. There is tenderness. There is no rebound and no guarding.   LLQ tenderness to deep palpation.    Genitourinary:   Genitourinary Comments: Normal external genitalia. Scant amount of whitish discharge. No CMT. Left adnexal tenderness.    Musculoskeletal: Normal range of motion. She exhibits no edema or tenderness.   No CVA tenderness   Lymphadenopathy:     She has no cervical adenopathy.   Neurological: She is alert and oriented to person, place, and time. She has normal strength.   Skin: Skin is warm and dry. No rash noted.         ED Course   Procedures  Labs Reviewed   URINALYSIS - Abnormal; Notable for the following:        Result Value    Protein, UA 1+ (*)     Urobilinogen, UA 2.0-3.0 (*)     All other components within normal limits   VAGINAL SCREEN - Abnormal; Notable for the following:     Clue Cells, Wet Prep Occasional (*)     WBC - Vaginal Screen Moderate (*)     Bacteria - Vaginal Screen Many (*)     All  other components within normal limits   URINALYSIS MICROSCOPIC - Abnormal; Notable for the following:     WBC, UA 7 (*)     Bacteria, UA Moderate (*)     All other components within normal limits   C. TRACHOMATIS/N. GONORRHOEAE BY AMP DNA             Medical Decision Making:   Clinical Tests:   Lab Tests: Ordered and Reviewed  Radiological Study: Ordered and Reviewed    Additional MDM:   Comments: 27-year-old female approximately 12 weeks pregnant presents complaining of nausea and vomiting that started this evening as well as left lower quadrant pain.  On exam she did have significant left lower quadrant tenderness to palpation.  In addition to IV fluids and Zofran the patient also obtained a pelvic ultrasound.  Pelvic ultrasound was significant only for an IUP at 11 weeks and 4 days.  Upon reassessment the patient appeared much more comfortable.  She stated the pain had resolved as did the nausea and vomiting.  She was able tolerate a by mouth challenge.  She was given a prescription for Zofran to supplement be likely just she is already on.  She was instructed to follow-up with her OB/GYN for reevaluation as needed if this persists.       Wet prep was obtained and significant for occasional clue cells.  The patient was discharged without a prescription for Flagyl.  She will be called to up-to-date on these results and prescription will be called to her pharmacy..          Scribe Attestation:   Scribe #1: I performed the above scribed service and the documentation accurately describes the services I performed. I attest to the accuracy of the note.    Attending Attestation:           Physician Attestation for Scribe:  Physician Attestation Statement for Scribe #1: I, Dr. Marquez, reviewed documentation, as scribed by Loren Bonner in my presence, and it is both accurate and complete.                 ED Course     Clinical Impression:     1. Nausea and vomiting during pregnancy    2. LLQ pain                                  Cira Marquez MD  08/19/17 0638    6:57 AM  Pt called and informed.  Rx called to [facility] [address].          Cira Marquez MD  08/19/17 0658

## 2017-08-19 NOTE — TELEPHONE ENCOUNTER
Reason for Disposition   [1] Abdominal pain AND [2] pregnant < 20 weeks   MODERATE-SEVERE abdominal pain (e.g., interferes with normal activities, awakens from sleep)    Protocols used: ST PREGNANCY - MORNING SICKNESS (NAUSEA AND VOMITING OF PREGNANCY)-[facility], ST PREGNANCY - ABDOMINAL PAIN LESS THAN 20 WEEKS EGA-A-    Siobhan is 3 months pregnant and reporting she has vomited 5x since 1930 and has not been able to hold down any fluids. She has taken the medications given to her for nausea with no relief. She is also stating that she has 6/10 abdominal pain that is constant. She does have some clear vaginal discharge when she was vomiting which she believes was urine. Denies other symptom as documented. Advised her to go to ED for evaluation and she agrees to plan. Please contact caller with any further care advice.

## 2017-08-28 ENCOUNTER — ROUTINE PRENATAL (OUTPATIENT)
Dept: OBSTETRICS AND GYNECOLOGY | Facility: CLINIC | Age: 27
End: 2017-08-28
Payer: COMMERCIAL

## 2017-08-28 VITALS — WEIGHT: 185.44 LBS | SYSTOLIC BLOOD PRESSURE: 98 MMHG | DIASTOLIC BLOOD PRESSURE: 62 MMHG | BODY MASS INDEX: 33.91 KG/M2

## 2017-08-28 DIAGNOSIS — Z34.80 SUPERVISION OF OTHER NORMAL PREGNANCY: ICD-10-CM

## 2017-08-28 DIAGNOSIS — O21.9 NAUSEA AND VOMITING IN PREGNANCY: ICD-10-CM

## 2017-08-28 DIAGNOSIS — Z98.891 HISTORY OF C-SECTION: Primary | ICD-10-CM

## 2017-08-28 PROCEDURE — 99999 PR PBB SHADOW E&M-EST. PATIENT-LVL III: CPT | Mod: PBBFAC,,, | Performed by: OBSTETRICS & GYNECOLOGY

## 2017-08-28 PROCEDURE — 0502F SUBSEQUENT PRENATAL CARE: CPT | Mod: S$GLB,,, | Performed by: OBSTETRICS & GYNECOLOGY

## 2017-08-28 RX ORDER — METRONIDAZOLE 500 MG/1
TABLET ORAL
Refills: 0 | COMMUNITY
Start: 2017-08-19 | End: 2017-09-18

## 2017-08-28 NOTE — PROGRESS NOTES
Could not hear with dopler; informal u/s confirms viability and FHT  RTO 3 weeks for QUAD, then anatomy at 20 weeks

## 2017-08-29 ENCOUNTER — TELEPHONE (OUTPATIENT)
Dept: OBSTETRICS AND GYNECOLOGY | Facility: CLINIC | Age: 27
End: 2017-08-29

## 2017-08-29 NOTE — TELEPHONE ENCOUNTER
----- Message from Terra Kelly sent at 8/29/2017 12:18 PM CDT -----  Contact: [phone]  Patient is requesting to speak with you asap regarding her disability claim

## 2017-08-29 NOTE — TELEPHONE ENCOUNTER
----- Message from Terra Kelly sent at 8/29/2017 10:11 AM CDT -----  Contact: [phone]  Patient is requesting to speak with you regarding her disability forms

## 2017-09-18 ENCOUNTER — ROUTINE PRENATAL (OUTPATIENT)
Dept: OBSTETRICS AND GYNECOLOGY | Facility: CLINIC | Age: 27
End: 2017-09-18
Payer: COMMERCIAL

## 2017-09-18 ENCOUNTER — LAB VISIT (OUTPATIENT)
Dept: LAB | Facility: HOSPITAL | Age: 27
End: 2017-09-18
Attending: OBSTETRICS & GYNECOLOGY
Payer: COMMERCIAL

## 2017-09-18 VITALS
BODY MASS INDEX: 34.56 KG/M2 | DIASTOLIC BLOOD PRESSURE: 60 MMHG | SYSTOLIC BLOOD PRESSURE: 102 MMHG | WEIGHT: 188.94 LBS

## 2017-09-18 DIAGNOSIS — Z34.80 SUPERVISION OF OTHER NORMAL PREGNANCY: Primary | ICD-10-CM

## 2017-09-18 DIAGNOSIS — Z98.891 HISTORY OF C-SECTION: ICD-10-CM

## 2017-09-18 DIAGNOSIS — Z34.80 SUPERVISION OF OTHER NORMAL PREGNANCY: ICD-10-CM

## 2017-09-18 PROCEDURE — 81511 FTL CGEN ABNOR FOUR ANAL: CPT

## 2017-09-18 PROCEDURE — 99999 PR PBB SHADOW E&M-EST. PATIENT-LVL II: CPT | Mod: PBBFAC,,, | Performed by: OBSTETRICS & GYNECOLOGY

## 2017-09-18 PROCEDURE — 36415 COLL VENOUS BLD VENIPUNCTURE: CPT

## 2017-09-18 PROCEDURE — 0502F SUBSEQUENT PRENATAL CARE: CPT | Mod: S$GLB,,, | Performed by: OBSTETRICS & GYNECOLOGY

## 2017-09-20 LAB
# FETUSES US: NORMAL
2ND TRIMESTER 4 SCREEN PNL SERPL: NEGATIVE
2ND TRIMESTER 4 SCREEN SERPL-IMP: NORMAL
AFP MOM SERPL: 1.04
AFP SERPL-MCNC: 35.2 NG/ML
AGE AT DELIVERY: 28
B-HCG MOM SERPL: 0.61
B-HCG SERPL-ACNC: 21.1 IU/ML
FET TS 21 RISK FROM MAT AGE: NORMAL
GA (DAYS): 1 D
GA (WEEKS): 16 WK
GA METHOD: NORMAL
IDDM PATIENT QL: NORMAL
INHIBIN A MOM SERPL: 0.74
INHIBIN A SERPL-MCNC: 108.4 PG/ML
SMOKING STATUS FTND: NO
TS 18 RISK FETUS: NORMAL
TS 21 RISK FETUS: NORMAL
U ESTRIOL MOM SERPL: 0.96
U ESTRIOL SERPL-MCNC: 0.77 NG/ML

## 2017-09-26 ENCOUNTER — TELEPHONE (OUTPATIENT)
Dept: OBSTETRICS AND GYNECOLOGY | Facility: CLINIC | Age: 27
End: 2017-09-26

## 2017-09-27 ENCOUNTER — TELEPHONE (OUTPATIENT)
Dept: OBSTETRICS AND GYNECOLOGY | Facility: CLINIC | Age: 27
End: 2017-09-27

## 2017-09-27 ENCOUNTER — HOSPITAL ENCOUNTER (EMERGENCY)
Facility: OTHER | Age: 27
Discharge: HOME | End: 2017-09-27
Attending: EMERGENCY MEDICINE
Payer: COMMERCIAL

## 2017-09-27 VITALS
HEART RATE: 81 BPM | DIASTOLIC BLOOD PRESSURE: 61 MMHG | OXYGEN SATURATION: 99 % | HEIGHT: 62 IN | WEIGHT: 185 LBS | SYSTOLIC BLOOD PRESSURE: 106 MMHG | BODY MASS INDEX: 34.04 KG/M2 | TEMPERATURE: 98 F | RESPIRATION RATE: 20 BRPM

## 2017-09-27 DIAGNOSIS — R11.2 NON-INTRACTABLE VOMITING WITH NAUSEA, UNSPECIFIED VOMITING TYPE: ICD-10-CM

## 2017-09-27 DIAGNOSIS — J06.9 UPPER RESPIRATORY TRACT INFECTION, UNSPECIFIED TYPE: ICD-10-CM

## 2017-09-27 DIAGNOSIS — R51.9 FRONTAL HEADACHE: Primary | ICD-10-CM

## 2017-09-27 DIAGNOSIS — H11.31 SUBCONJUNCTIVAL HEMATOMA, RIGHT: ICD-10-CM

## 2017-09-27 LAB
B-HCG UR QL: POSITIVE
BILIRUB UR QL STRIP: NEGATIVE
CLARITY UR: CLEAR
COLOR UR: YELLOW
CTP QC/QA: YES
GLUCOSE UR QL STRIP: NEGATIVE
HGB UR QL STRIP: NEGATIVE
KETONES UR QL STRIP: NEGATIVE
LEUKOCYTE ESTERASE UR QL STRIP: NEGATIVE
NITRITE UR QL STRIP: NEGATIVE
PH UR STRIP: 7 [PH] (ref 5–8)
PROT UR QL STRIP: NEGATIVE
SP GR UR STRIP: 1.01 (ref 1–1.03)
URN SPEC COLLECT METH UR: NORMAL
UROBILINOGEN UR STRIP-ACNC: NEGATIVE EU/DL

## 2017-09-27 PROCEDURE — 81003 URINALYSIS AUTO W/O SCOPE: CPT

## 2017-09-27 PROCEDURE — 99283 EMERGENCY DEPT VISIT LOW MDM: CPT | Mod: 25

## 2017-09-27 PROCEDURE — 81025 URINE PREGNANCY TEST: CPT | Performed by: EMERGENCY MEDICINE

## 2017-09-27 RX ORDER — BUTALBITAL, ACETAMINOPHEN AND CAFFEINE 50; 325; 40 MG/1; MG/1; MG/1
1-2 TABLET ORAL EVERY 6 HOURS PRN
Qty: 10 TABLET | Refills: 0 | Status: SHIPPED | OUTPATIENT
Start: 2017-09-27 | End: 2017-10-27

## 2017-09-27 RX ORDER — FLUTICASONE PROPIONATE 50 MCG
1 SPRAY, SUSPENSION (ML) NASAL 2 TIMES DAILY PRN
Qty: 15 G | Refills: 0 | Status: SHIPPED | OUTPATIENT
Start: 2017-09-27 | End: 2019-05-22

## 2017-09-27 NOTE — TELEPHONE ENCOUNTER
----- Message from Hayley Crespo sent at 9/27/2017 12:50 PM CDT -----  Contact: self, [phone]  Patient states she has had a headaches since 2:30am and noticed she has a blood clot in her eye and is very concerned. Please advise.

## 2017-09-27 NOTE — ED PROVIDER NOTES
"Encounter Date: 2017    SCRIBE #1 NOTE: I, Ginnarogers Barrett, am scribing for, and in the presence of, Dr. Andre.       History     Chief Complaint   Patient presents with    Headache     Pt CO headache since this morning , also CO " a blood clot in my eye"     Time seen by provider: 3:03 PM    This is a 27 y.o. female who is  and approximately 5 months pregnant that presents with complaint of a frontal HA that has persisted since this morning. The patient rates her constant discomfort a 6/10 in severity. She endorses associated nausea and vomiting x 1 episode this morning, as well as "a blood clot in the corner of my right eye."  She noted that "blood clot" after her episode of vomiting this morning.  Although she attempted to alleviate her discomfort with 4 Tylenol, she has found no relief. She mentions no other identifying, alleviating, or exacerbating factors. The patient denies recent fever, chills, eye pain, visual disturbances, abdominal pain, diarrhea, constipation, dysuria, changes in urinary frequency or urgency, and vaginal bleeding. 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       The history is provided by the patient.     Review of patient's allergies indicates:  No Known Allergies  Past Medical History:   Diagnosis Date    Asthma      Past Surgical History:   Procedure Laterality Date     SECTION       Family History   Problem Relation Age of Onset    Diabetes Mother      Social History   Substance Use Topics    Smoking status: Never Smoker    Smokeless tobacco: Never Used    Alcohol use No     Review of Systems   Constitutional: Negative for chills and fever.   HENT: Positive for congestion. Negative for rhinorrhea and sore throat.    Eyes: Negative for visual disturbance.   Respiratory: Negative for cough and shortness of breath.    Cardiovascular: Negative for chest pain and palpitations.   Gastrointestinal: Positive for nausea " "and vomiting. Negative for abdominal pain and diarrhea.   Genitourinary: Negative for dysuria, frequency, urgency, vaginal bleeding and vaginal discharge.   Musculoskeletal: Negative for joint swelling, neck pain and neck stiffness.   Skin: Negative for rash.   Neurological: Positive for headaches. Negative for weakness and numbness.   Psychiatric/Behavioral: Negative for confusion.       Physical Exam     Initial Vitals [09/27/17 1412]   BP Pulse Resp Temp SpO2   (!) 121/58 87 16 98.2 °F (36.8 °C) 99 %      MAP       79         Vitals:    09/27/17 1412 09/27/17 1630   BP: (!) 121/58 106/61   Pulse: 87 81   Resp: 16 20   Temp: 98.2 °F (36.8 °C)    TempSrc: Oral    SpO2: 99%    Weight: 83.9 kg (185 lb)    Height: 5' 2" (1.575 m)        Physical Exam    Nursing note and vitals reviewed.  Constitutional: She appears well-developed and well-nourished. No distress.   HENT:   Head: Normocephalic and atraumatic.   Cobblestoning of the posterior oropharynx. No sinus tenderness to palpation. Swollen nasal turbinates.   Eyes: EOM are normal. Pupils are equal, round, and reactive to light.   5 mm by 5 mm subconjunctival hemorrhage at the medial canthus of the right eye. Normal pupils. Conjunctivae otherwise normal.    Neck: Normal range of motion. Neck supple.   Cardiovascular: Normal rate, regular rhythm and normal heart sounds.   No murmur heard.  Pulmonary/Chest: Breath sounds normal. No respiratory distress.   Abdominal: Soft. Bowel sounds are normal. There is no tenderness.   Musculoskeletal: Normal range of motion.   Neurological: She is alert and oriented to person, place, and time. She has normal strength. No cranial nerve deficit or sensory deficit.   Skin: Skin is warm and dry. No rash noted.   Psychiatric: She has a normal mood and affect. Her behavior is normal.         ED Course   Procedures  Labs Reviewed   POCT URINE PREGNANCY - Abnormal; Notable for the following:        Result Value    POC Preg Test, Ur " Positive (*)     All other components within normal limits   URINALYSIS              Medical Decision Making:   History:   Old Medical Records: I decided to obtain old medical records.  Clinical Tests:   Radiological Study: Ordered and Reviewed  ED Management:  Emergent evaluation a 27-year-old pregnant female who presents with complaint of headache since this morning, eye problem, an episode of nausea and vomiting.  Vital signs are benign, afebrile.  Urinalysis shows no proteinuria or evidence of UTI.  Fetal heart tones are within normal limits.  On exam, she has swollen turbinates, unclear if headache is related to sinusitis I do not suspect meningitis or encephalitis.  There is no neurologic deficit, I doubt CVA or bleed.  I exam reveals a small subconjunctival hemorrhage, likely from her episode of vomiting earlier today.  There is no visual deficit or other abnormality.  She is discharged in good condition, advised close follow-up with her OB/GYN or return for any new or worsening symptoms.  I did give her prescription for short course of Fioricet to try for her headache, as well as Flonase to help with sinus            Scribe Attestation:   Scribe #1: I performed the above scribed service and the documentation accurately describes the services I performed. I attest to the accuracy of the note.    Attending Attestation:           Physician Attestation for Scribe:  Physician Attestation Statement for Scribe #1: I, Dr. Andre, reviewed documentation, as scribed by Ginna Barrett in my presence, and it is both accurate and complete.                   Clinical Impression:     1. Frontal headache    2. Upper respiratory tract infection, unspecified type    3. Subconjunctival hematoma, right    4. Non-intractable vomiting with nausea, unspecified vomiting type                                 Alisia Andre MD  09/27/17 0618

## 2017-09-27 NOTE — ED TRIAGE NOTES
Pt reports to ED c/o 6/10 frontal headache with  hemorrhage to right eye and sinus congestion. Pt is approx 17 weeks pregnant, admits morning sickness and does admit vomiting this morning, noticed hemorrhage approx 3 hours after vomiting. Denies vision changes, dysuria/hematuria, abd pain, vaginal bleeding/discharge, fevers, chills.

## 2017-10-16 ENCOUNTER — OFFICE VISIT (OUTPATIENT)
Dept: OBSTETRICS AND GYNECOLOGY | Facility: CLINIC | Age: 27
End: 2017-10-16
Payer: COMMERCIAL

## 2017-10-16 ENCOUNTER — ROUTINE PRENATAL (OUTPATIENT)
Dept: OBSTETRICS AND GYNECOLOGY | Facility: CLINIC | Age: 27
End: 2017-10-16
Payer: COMMERCIAL

## 2017-10-16 VITALS — DIASTOLIC BLOOD PRESSURE: 80 MMHG | SYSTOLIC BLOOD PRESSURE: 100 MMHG

## 2017-10-16 DIAGNOSIS — Z34.82 ENCOUNTER FOR SUPERVISION OF OTHER NORMAL PREGNANCY IN SECOND TRIMESTER: Primary | ICD-10-CM

## 2017-10-16 DIAGNOSIS — Z98.891 HISTORY OF C-SECTION: ICD-10-CM

## 2017-10-16 DIAGNOSIS — Z34.80 ENCOUNTER FOR SUPERVISION OF OTHER NORMAL PREGNANCY: ICD-10-CM

## 2017-10-16 DIAGNOSIS — Z36.3 ANTENATAL SCREENING FOR MALFORMATION USING ULTRASONICS: Primary | ICD-10-CM

## 2017-10-16 PROCEDURE — 76805 OB US >/= 14 WKS SNGL FETUS: CPT | Mod: S$GLB,,, | Performed by: OBSTETRICS & GYNECOLOGY

## 2017-10-16 PROCEDURE — 0502F SUBSEQUENT PRENATAL CARE: CPT | Mod: S$GLB,,, | Performed by: OBSTETRICS & GYNECOLOGY

## 2017-10-16 PROCEDURE — 99999 PR PBB SHADOW E&M-EST. PATIENT-LVL II: CPT | Mod: PBBFAC,,, | Performed by: OBSTETRICS & GYNECOLOGY

## 2017-10-16 NOTE — PROGRESS NOTES
U/S: Breech Female; inadequate visualization of some structures---will repeat scan in 3 months  HA and sleep problem---Tylenol PM OK

## 2017-11-07 ENCOUNTER — TELEPHONE (OUTPATIENT)
Dept: OBSTETRICS AND GYNECOLOGY | Facility: CLINIC | Age: 27
End: 2017-11-07

## 2017-11-07 NOTE — LETTER
November 8, 2017     Siobhan Chappell  [facility] [address]             To Whom It May Concern:    RE:  Siobhan Chappell    The above patient is pregnant and under my care for her pregnancy.  If she needs a dental procedure, she may have the following medications.    She may have lidocaine for local anesthesia.  Vicodin if she needs something for pain, and amoxicillin or penicillin if antibiotic is needed.    If you have any further questions, please feel free to contact me.            Sincerely,                  Dr. Hector Su

## 2017-11-07 NOTE — TELEPHONE ENCOUNTER
----- Message from Simi Stewart sent at 11/7/2017  8:25 AM CST -----  No. [phone]  OB patient is 6 months pregnant.  She has a toothache.  The dentist needs a letter stating it is OK to check out her mouth.   Fax no. [phone]  Patient would like to make a dental appointment today.

## 2017-11-08 NOTE — TELEPHONE ENCOUNTER
----- Message from Dori Bowden sent at 11/8/2017  8:15 AM CST -----  Contact: Self/ [phone]  Patient would like to speak with you about getting a referral for a dentist today because she is having a toothache. Please advise

## 2017-11-09 ENCOUNTER — TELEPHONE (OUTPATIENT)
Dept: OBSTETRICS AND GYNECOLOGY | Facility: CLINIC | Age: 27
End: 2017-11-09

## 2017-11-09 NOTE — TELEPHONE ENCOUNTER
----- Message from Terra Kelly sent at 11/9/2017  2:10 PM CST -----  Contact: [phone]  Patient states she requested a referral to see a dentist since she is pregnant, patient is requesting a call back today before 330 pm, patient states she is having severe pain    Fax [phone]    Dr meeks

## 2017-11-20 ENCOUNTER — ROUTINE PRENATAL (OUTPATIENT)
Dept: OBSTETRICS AND GYNECOLOGY | Facility: CLINIC | Age: 27
End: 2017-11-20
Payer: COMMERCIAL

## 2017-11-20 VITALS
BODY MASS INDEX: 38.91 KG/M2 | WEIGHT: 212.75 LBS | DIASTOLIC BLOOD PRESSURE: 60 MMHG | SYSTOLIC BLOOD PRESSURE: 110 MMHG

## 2017-11-20 DIAGNOSIS — Z98.891 HISTORY OF C-SECTION: ICD-10-CM

## 2017-11-20 DIAGNOSIS — Z34.82 ENCOUNTER FOR SUPERVISION OF OTHER NORMAL PREGNANCY IN SECOND TRIMESTER: Primary | ICD-10-CM

## 2017-11-20 PROCEDURE — 99999 PR PBB SHADOW E&M-EST. PATIENT-LVL III: CPT | Mod: PBBFAC,,, | Performed by: OBSTETRICS & GYNECOLOGY

## 2017-11-20 PROCEDURE — 0502F SUBSEQUENT PRENATAL CARE: CPT | Mod: S$GLB,,, | Performed by: OBSTETRICS & GYNECOLOGY

## 2017-11-20 RX ORDER — ACETAMINOPHEN AND CODEINE PHOSPHATE 300; 30 MG/1; MG/1
TABLET ORAL
Refills: 0 | COMMUNITY
Start: 2017-11-14 | End: 2019-05-22

## 2017-11-30 ENCOUNTER — TELEPHONE (OUTPATIENT)
Dept: OBSTETRICS AND GYNECOLOGY | Facility: CLINIC | Age: 27
End: 2017-11-30

## 2017-11-30 DIAGNOSIS — R11.0 NAUSEA: Primary | ICD-10-CM

## 2017-11-30 RX ORDER — ONDANSETRON 8 MG/1
8 TABLET, ORALLY DISINTEGRATING ORAL EVERY 6 HOURS PRN
Qty: 20 TABLET | Refills: 3 | Status: SHIPPED | OUTPATIENT
Start: 2017-11-30 | End: 2019-05-22

## 2017-11-30 NOTE — TELEPHONE ENCOUNTER
----- Message from Terra Kelly sent at 11/30/2017  7:44 AM CST -----  Contact: [phone]  Patient is requesting zofran for nausea    [facility] on file

## 2017-12-18 ENCOUNTER — ROUTINE PRENATAL (OUTPATIENT)
Dept: OBSTETRICS AND GYNECOLOGY | Facility: CLINIC | Age: 27
End: 2017-12-18
Payer: COMMERCIAL

## 2017-12-18 ENCOUNTER — LAB VISIT (OUTPATIENT)
Dept: LAB | Facility: HOSPITAL | Age: 27
End: 2017-12-18
Attending: OBSTETRICS & GYNECOLOGY
Payer: COMMERCIAL

## 2017-12-18 VITALS
DIASTOLIC BLOOD PRESSURE: 62 MMHG | WEIGHT: 220.44 LBS | BODY MASS INDEX: 40.32 KG/M2 | SYSTOLIC BLOOD PRESSURE: 112 MMHG

## 2017-12-18 DIAGNOSIS — Z98.891 HISTORY OF C-SECTION: ICD-10-CM

## 2017-12-18 DIAGNOSIS — Z34.82 ENCOUNTER FOR SUPERVISION OF OTHER NORMAL PREGNANCY IN SECOND TRIMESTER: ICD-10-CM

## 2017-12-18 DIAGNOSIS — Z34.83 ENCOUNTER FOR SUPERVISION OF OTHER NORMAL PREGNANCY IN THIRD TRIMESTER: Primary | ICD-10-CM

## 2017-12-18 LAB
BASOPHILS # BLD AUTO: 0.01 K/UL
BASOPHILS NFR BLD: 0.1 %
DIFFERENTIAL METHOD: ABNORMAL
EOSINOPHIL # BLD AUTO: 0 K/UL
EOSINOPHIL NFR BLD: 0.3 %
ERYTHROCYTE [DISTWIDTH] IN BLOOD BY AUTOMATED COUNT: 13.6 %
GLUCOSE SERPL-MCNC: 115 MG/DL
HCT VFR BLD AUTO: 28.1 %
HGB BLD-MCNC: 8.8 G/DL
LYMPHOCYTES # BLD AUTO: 1.7 K/UL
LYMPHOCYTES NFR BLD: 17.3 %
MCH RBC QN AUTO: 26.2 PG
MCHC RBC AUTO-ENTMCNC: 31.3 G/DL
MCV RBC AUTO: 84 FL
MONOCYTES # BLD AUTO: 0.6 K/UL
MONOCYTES NFR BLD: 6.4 %
NEUTROPHILS # BLD AUTO: 7.3 K/UL
NEUTROPHILS NFR BLD: 75.5 %
PLATELET # BLD AUTO: 256 K/UL
PMV BLD AUTO: 9.7 FL
RBC # BLD AUTO: 3.36 M/UL
WBC # BLD AUTO: 9.7 K/UL

## 2017-12-18 PROCEDURE — 0502F SUBSEQUENT PRENATAL CARE: CPT | Mod: S$GLB,,, | Performed by: OBSTETRICS & GYNECOLOGY

## 2017-12-18 PROCEDURE — 82950 GLUCOSE TEST: CPT

## 2017-12-18 PROCEDURE — 85025 COMPLETE CBC W/AUTO DIFF WBC: CPT

## 2017-12-18 PROCEDURE — 99999 PR PBB SHADOW E&M-EST. PATIENT-LVL III: CPT | Mod: PBBFAC,,, | Performed by: OBSTETRICS & GYNECOLOGY

## 2017-12-18 PROCEDURE — 36415 COLL VENOUS BLD VENIPUNCTURE: CPT

## 2017-12-18 NOTE — PROGRESS NOTES
Labs in process; OK to get dental work done with usual precautions.  Kick counts for FM assessment discussed; dyspareunia discussed.  RTO 2-3 weeks

## 2018-01-08 ENCOUNTER — PROCEDURE VISIT (OUTPATIENT)
Dept: OBSTETRICS AND GYNECOLOGY | Facility: CLINIC | Age: 28
End: 2018-01-08
Payer: COMMERCIAL

## 2018-01-08 ENCOUNTER — ROUTINE PRENATAL (OUTPATIENT)
Dept: OBSTETRICS AND GYNECOLOGY | Facility: CLINIC | Age: 28
End: 2018-01-08
Payer: COMMERCIAL

## 2018-01-08 VITALS — WEIGHT: 221.13 LBS | BODY MASS INDEX: 40.44 KG/M2

## 2018-01-08 DIAGNOSIS — O26.849 UTERINE SIZE DATE DISCREPANCY, ANTEPARTUM, UNSPECIFIED TRIMESTER: Primary | ICD-10-CM

## 2018-01-08 DIAGNOSIS — Z98.891 HISTORY OF C-SECTION: ICD-10-CM

## 2018-01-08 DIAGNOSIS — O32.9XX0 MALPRESENTATION BEFORE ONSET OF LABOR, SINGLE OR UNSPECIFIED FETUS: ICD-10-CM

## 2018-01-08 DIAGNOSIS — Z36.89 ENCOUNTER FOR ULTRASOUND TO CHECK FETAL GROWTH: ICD-10-CM

## 2018-01-08 DIAGNOSIS — Z3A.32 32 WEEKS GESTATION OF PREGNANCY: Primary | ICD-10-CM

## 2018-01-08 PROCEDURE — 76816 OB US FOLLOW-UP PER FETUS: CPT | Mod: S$GLB,,, | Performed by: OBSTETRICS & GYNECOLOGY

## 2018-01-08 PROCEDURE — 0502F SUBSEQUENT PRENATAL CARE: CPT | Mod: S$GLB,,, | Performed by: OBSTETRICS & GYNECOLOGY

## 2018-01-08 PROCEDURE — 99999 PR PBB SHADOW E&M-EST. PATIENT-LVL II: CPT | Mod: PBBFAC,,, | Performed by: OBSTETRICS & GYNECOLOGY

## 2018-01-22 ENCOUNTER — ROUTINE PRENATAL (OUTPATIENT)
Dept: OBSTETRICS AND GYNECOLOGY | Facility: CLINIC | Age: 28
End: 2018-01-22
Payer: COMMERCIAL

## 2018-01-22 VITALS
DIASTOLIC BLOOD PRESSURE: 60 MMHG | BODY MASS INDEX: 40.48 KG/M2 | WEIGHT: 221.31 LBS | SYSTOLIC BLOOD PRESSURE: 116 MMHG

## 2018-01-22 DIAGNOSIS — Z98.891 HISTORY OF C-SECTION: Primary | ICD-10-CM

## 2018-01-22 DIAGNOSIS — Z34.83 PRENATAL CARE, SUBSEQUENT PREGNANCY IN THIRD TRIMESTER: ICD-10-CM

## 2018-01-22 PROCEDURE — 0502F SUBSEQUENT PRENATAL CARE: CPT | Mod: S$GLB,,, | Performed by: OBSTETRICS & GYNECOLOGY

## 2018-01-22 PROCEDURE — 99999 PR PBB SHADOW E&M-EST. PATIENT-LVL III: CPT | Mod: PBBFAC,,, | Performed by: OBSTETRICS & GYNECOLOGY

## 2018-01-22 NOTE — PROGRESS NOTES
Will schedule repeatr C/S and BTL for 2/26/2018 (39+ weeks)  Increase fluids---call if any bladder symptoms develop  RTO 2 weeks

## 2018-01-26 ENCOUNTER — TELEPHONE (OUTPATIENT)
Dept: OBSTETRICS AND GYNECOLOGY | Facility: CLINIC | Age: 28
End: 2018-01-26

## 2018-01-26 ENCOUNTER — HOSPITAL ENCOUNTER (EMERGENCY)
Facility: OTHER | Age: 28
Discharge: HOME | End: 2018-01-26
Attending: OBSTETRICS & GYNECOLOGY
Payer: COMMERCIAL

## 2018-01-26 VITALS
SYSTOLIC BLOOD PRESSURE: 118 MMHG | HEART RATE: 110 BPM | TEMPERATURE: 98 F | OXYGEN SATURATION: 100 % | RESPIRATION RATE: 18 BRPM | DIASTOLIC BLOOD PRESSURE: 68 MMHG

## 2018-01-26 DIAGNOSIS — O47.03 PRETERM UTERINE CONTRACTIONS IN THIRD TRIMESTER, ANTEPARTUM: ICD-10-CM

## 2018-01-26 DIAGNOSIS — O99.891 BACK PAIN AFFECTING PREGNANCY IN THIRD TRIMESTER: ICD-10-CM

## 2018-01-26 DIAGNOSIS — J45.909 CHILDHOOD ASTHMA, UNSPECIFIED ASTHMA SEVERITY, UNSPECIFIED WHETHER COMPLICATED, UNSPECIFIED WHETHER PERSISTENT: ICD-10-CM

## 2018-01-26 DIAGNOSIS — Z3A.34 34 WEEKS GESTATION OF PREGNANCY: ICD-10-CM

## 2018-01-26 DIAGNOSIS — M54.9 BACK PAIN AFFECTING PREGNANCY IN THIRD TRIMESTER: ICD-10-CM

## 2018-01-26 DIAGNOSIS — O36.8390 MATERNAL CARE FOR FETAL TACHYCARDIA DURING PREGNANCY: Primary | ICD-10-CM

## 2018-01-26 LAB
BACTERIA #/AREA URNS HPF: ABNORMAL /HPF
BASOPHILS # BLD AUTO: ABNORMAL K/UL
BASOPHILS NFR BLD: 0 %
BILIRUB UR QL STRIP: NEGATIVE
CLARITY UR: ABNORMAL
COLOR UR: YELLOW
DACRYOCYTES BLD QL SMEAR: ABNORMAL
DIFFERENTIAL METHOD: ABNORMAL
EOSINOPHIL # BLD AUTO: ABNORMAL K/UL
EOSINOPHIL NFR BLD: 2 %
ERYTHROCYTE [DISTWIDTH] IN BLOOD BY AUTOMATED COUNT: 15.6 %
GIANT PLATELETS BLD QL SMEAR: PRESENT
GLUCOSE UR QL STRIP: NEGATIVE
HCT VFR BLD AUTO: 30.9 %
HGB BLD-MCNC: 9.7 G/DL
HGB UR QL STRIP: NEGATIVE
KETONES UR QL STRIP: ABNORMAL
LEUKOCYTE ESTERASE UR QL STRIP: ABNORMAL
LYMPHOCYTES # BLD AUTO: ABNORMAL K/UL
LYMPHOCYTES NFR BLD: 6 %
MCH RBC QN AUTO: 25.1 PG
MCHC RBC AUTO-ENTMCNC: 31.4 G/DL
MCV RBC AUTO: 80 FL
MICROSCOPIC COMMENT: ABNORMAL
MONOCYTES # BLD AUTO: ABNORMAL K/UL
MONOCYTES NFR BLD: 8 %
NEUTROPHILS NFR BLD: 83 %
NEUTS BAND NFR BLD MANUAL: 1 %
NITRITE UR QL STRIP: NEGATIVE
OVALOCYTES BLD QL SMEAR: ABNORMAL
PH UR STRIP: 6 [PH] (ref 5–8)
PLATELET # BLD AUTO: 172 K/UL
PLATELET BLD QL SMEAR: ABNORMAL
PMV BLD AUTO: 10.7 FL
POIKILOCYTOSIS BLD QL SMEAR: SLIGHT
POLYCHROMASIA BLD QL SMEAR: ABNORMAL
PROT UR QL STRIP: NEGATIVE
RBC # BLD AUTO: 3.86 M/UL
RBC #/AREA URNS HPF: 1 /HPF (ref 0–4)
SP GR UR STRIP: 1.01 (ref 1–1.03)
URN SPEC COLLECT METH UR: ABNORMAL
UROBILINOGEN UR STRIP-ACNC: NEGATIVE EU/DL
WBC # BLD AUTO: 7.77 K/UL
WBC #/AREA URNS HPF: 20 /HPF (ref 0–5)

## 2018-01-26 PROCEDURE — 99284 EMERGENCY DEPT VISIT MOD MDM: CPT | Mod: 25,,, | Performed by: OBSTETRICS & GYNECOLOGY

## 2018-01-26 PROCEDURE — 59025 FETAL NON-STRESS TEST: CPT | Mod: 26,,, | Performed by: OBSTETRICS & GYNECOLOGY

## 2018-01-26 PROCEDURE — 87077 CULTURE AEROBIC IDENTIFY: CPT

## 2018-01-26 PROCEDURE — 87081 CULTURE SCREEN ONLY: CPT | Mod: 59

## 2018-01-26 PROCEDURE — 96375 TX/PRO/DX INJ NEW DRUG ADDON: CPT

## 2018-01-26 PROCEDURE — 87088 URINE BACTERIA CULTURE: CPT

## 2018-01-26 PROCEDURE — 25000003 PHARM REV CODE 250: Performed by: STUDENT IN AN ORGANIZED HEALTH CARE EDUCATION/TRAINING PROGRAM

## 2018-01-26 PROCEDURE — 96361 HYDRATE IV INFUSION ADD-ON: CPT

## 2018-01-26 PROCEDURE — 99285 EMERGENCY DEPT VISIT HI MDM: CPT | Mod: 25

## 2018-01-26 PROCEDURE — 63600175 PHARM REV CODE 636 W HCPCS: Performed by: STUDENT IN AN ORGANIZED HEALTH CARE EDUCATION/TRAINING PROGRAM

## 2018-01-26 PROCEDURE — 59025 FETAL NON-STRESS TEST: CPT

## 2018-01-26 PROCEDURE — 87086 URINE CULTURE/COLONY COUNT: CPT

## 2018-01-26 PROCEDURE — 87186 SC STD MICRODIL/AGAR DIL: CPT

## 2018-01-26 PROCEDURE — 85027 COMPLETE CBC AUTOMATED: CPT

## 2018-01-26 PROCEDURE — 81000 URINALYSIS NONAUTO W/SCOPE: CPT

## 2018-01-26 PROCEDURE — 85007 BL SMEAR W/DIFF WBC COUNT: CPT

## 2018-01-26 PROCEDURE — 96365 THER/PROPH/DIAG IV INF INIT: CPT

## 2018-01-26 RX ORDER — BUTORPHANOL TARTRATE 1 MG/ML
1 INJECTION INTRAMUSCULAR; INTRAVENOUS ONCE
Status: COMPLETED | OUTPATIENT
Start: 2018-01-26 | End: 2018-01-26

## 2018-01-26 RX ORDER — ACETAMINOPHEN 500 MG
1000 TABLET ORAL ONCE
Status: COMPLETED | OUTPATIENT
Start: 2018-01-26 | End: 2018-01-26

## 2018-01-26 RX ADMIN — ACETAMINOPHEN 1000 MG: 500 TABLET ORAL at 02:01

## 2018-01-26 RX ADMIN — BUTORPHANOL TARTRATE 1 MG: 1 INJECTION, SOLUTION INTRAMUSCULAR; INTRAVENOUS at 04:01

## 2018-01-26 RX ADMIN — PROMETHAZINE HYDROCHLORIDE 12.5 MG: 25 INJECTION INTRAMUSCULAR; INTRAVENOUS at 04:01

## 2018-01-26 RX ADMIN — SODIUM CHLORIDE, SODIUM LACTATE, POTASSIUM CHLORIDE, AND CALCIUM CHLORIDE 1000 ML: .6; .31; .03; .02 INJECTION, SOLUTION INTRAVENOUS at 03:01

## 2018-01-26 NOTE — TELEPHONE ENCOUNTER
Returned patients call.   Patient was notified that Dr. Su is not in but she is in the best place for her care at this time. Patient verbalized understanding.

## 2018-01-26 NOTE — TELEPHONE ENCOUNTER
----- Message from Lorraine Aleman sent at 1/26/2018  3:27 PM CST -----  Contact: [phone]/self  Patient is in [facility]. Please call and advise.

## 2018-01-26 NOTE — ED PROVIDER NOTES
"Encounter Date: 2018       History     Chief Complaint   Patient presents with    Contractions     Siobhan Chappell is a 28 y.o. [id number] at 34w5d presents complaining of low back pain and vaginal pain beginning this morning. She went to work this morning and states that the pain has been getting worse. She states that she had a  section for her last delivery because she "couldn't push the baby out" and doesn't know what labor pains feel like. She had thin brownish/white vaginal discharge earlier today which she says soaked her underwear but denies current discharge. She mainly feels a lot of pain and pressure in her low back and vagina at this time. She denies dysuria, difficulty urinating, abnormal bowel movements, or recent fevers/chills.  Patient denies vaginal bleeding and notes good fetal movement.  This IUP is complicated by asthma (childhood only, no inhaler use since childhood).          Review of patient's allergies indicates:  No Known Allergies  Past Medical History:   Diagnosis Date    Asthma      Past Surgical History:   Procedure Laterality Date     SECTION       Family History   Problem Relation Age of Onset    Diabetes Mother      Social History   Substance Use Topics    Smoking status: Never Smoker    Smokeless tobacco: Never Used    Alcohol use No     Review of Systems   Constitutional: Negative for chills and fever.   HENT: Negative for congestion.    Respiratory: Negative for shortness of breath.    Cardiovascular: Negative for chest pain.   Gastrointestinal: Positive for abdominal pain. Negative for constipation and diarrhea.   Genitourinary: Positive for vaginal discharge and vaginal pain. Negative for difficulty urinating, dysuria, flank pain and vaginal bleeding.   Neurological: Negative for headaches.       Physical Exam     Vitals:    18 1417 18 1422 18 1427 18 1432   BP:       Pulse: (!) 127 (!) 117 (!) 121 (!) 111   Resp:     "   Temp:       SpO2: 100% 100% 100% 100%     Physical Exam    Vitals reviewed.  Constitutional: She appears well-developed and well-nourished. She appears distressed (mild distress, appears uncomfortable).   HENT:   Head: Normocephalic and atraumatic.   Eyes: EOM are normal.   Neck: Normal range of motion.   Cardiovascular:   Tachycardic in 120s   Pulmonary/Chest: No respiratory distress.   Abdominal: Soft. There is no tenderness.   Genitourinary:   Genitourinary Comments: Negative CVA tenderness bilaterally   Musculoskeletal: Normal range of motion.   Neurological: She is alert and oriented to person, place, and time.   Skin: Skin is warm and dry.   Psychiatric: She has a normal mood and affect. Her behavior is normal. Judgment and thought content normal.     OB LABOR EXAM:             Dilatation: 0.   Station: -4.   Effacement: 50%.             ED Course   Obtain Fetal nonstress test (NST)  Date/Time: 1/26/2018 2:40 PM  Performed by: REDDY, SUSHMA K  Authorized by: REDDY, SUSHMA K     Nonstress Test:     Variability:  6-25 BPM    Accelerations:  15 bpm    Baseline:  175    Uterine Irritability: No      Contractions:  Not present      Labs Reviewed - No data to display          Medical Decision Making:   ED Management:  NST started on arrival. Fetal tachycardia noted to 180s. Moderate btbv. No ctx on tocometry.  Negative CVA tenderness, no signs or symptoms of UTI. UA ordered to r/o.  Patient is afebrile but tachycardic (120's).  Denies any recent illness, fevers, or chills. Has not taken any medications for her discomfort.  SSE: negative pooling, valsalva, nitrazine. Scant physiologic white discharge.  SVE: 0/50/-4. Same at 2 hour recheck.  GBS swab done.  IV placed.  CBC ordered. WBC 7.7.    LR 1L bolus- maternal tachycardia improved to low 100s.  Fetal tachycardia resolved.    UA 1+ leuks, otherwise neg. Culture sent.  Cervix unchanged (cl/th/hi) over 2 hours, however patient now noted to be penny regularly  after toco adjusted.   2nd liter initiated. Stadol/phenergan given.     Patient reports improvement in sx following stadol/phenergan and continued IVF. Cervix again rechecked after 2 hours and noted to be unchanged. Contractions appear to have spaced from q4 min to irregular.     NST overall reactive and reassuring, 140 bpm, mod btbv, +accels. Small deceleration x 1 noted, however spontaneous accels noted x 20 min thereafter. Patient discharged in stable condition.     Other:   I have discussed this case with another health care provider.       <> Summary of the Discussion: D/w Dr. Gillis              Attending Attestation:   Physician Attestation Statement for Resident:  As the supervising MD   Physician Attestation Statement: I have personally seen and examined this patient.   I agree with the above history. -:   As the supervising MD I agree with the above PE.    As the supervising MD I agree with the above treatment, course, plan, and disposition.   -:   NST  I independently reviewed the fetal non-stress test with the following interpretation:  140 BPM baseline  Variability: moderate  Accelerations: present  Decelerations: absent  Contractions: occasional  Category 1    Clinical Interpretation:age appropriate    Patient evaluated and found to be stable, agree with resident's assessment of false labor but with initial maternal and fetal tachycardia which responded to IV hydration and plan to discharge to home with instructions to stay well hydrated and to present for s/s active labor.  I was personally present during the critical portions of the procedure(s) performed by the resident and was immediately available in the ED to provide services and assistance as needed during the entire procedure.  I have reviewed and agree with the residents interpretation of the following: lab data.  I have reviewed the following: old records at this facility.                    ED Course      Clinical Impression:   The primary  encounter diagnosis was Maternal care for fetal tachycardia during pregnancy. Diagnoses of Back pain affecting pregnancy in third trimester, 34 weeks gestation of pregnancy, and Childhood asthma, unspecified asthma severity, unspecified whether complicated, unspecified whether persistent were also pertinent to this visit.                           Jessa Cain MD  Resident  01/27/18 1270       Sophie Gillis MD  01/28/18 3450

## 2018-01-27 NOTE — DISCHARGE INSTRUCTIONS
Labor Precautions  Return if you experience contractions, uterine tightening or cramps(more than 4 in 1 hour for 2 consecutive hours)  Backache that comes and goes  Pelvic pressure  Change in vaginal discharge  Vaginal Bleeding  Leaking of fluid  Call the OB [phone]) during regular business hours or L&D([phone]) after hours for any questions or concerns  Keep previously scheduled clinic appointment  Drink 8-10 glasses of water a day

## 2018-01-28 ENCOUNTER — TELEPHONE (OUTPATIENT)
Dept: OBSTETRICS AND GYNECOLOGY | Facility: HOSPITAL | Age: 28
End: 2018-01-28

## 2018-01-28 DIAGNOSIS — N30.00 ACUTE CYSTITIS WITHOUT HEMATURIA: Primary | ICD-10-CM

## 2018-01-28 LAB — BACTERIA UR CULT: NORMAL

## 2018-01-28 RX ORDER — CEPHALEXIN 500 MG/1
500 CAPSULE ORAL EVERY 12 HOURS
Qty: 20 CAPSULE | Refills: 0 | Status: SHIPPED | OUTPATIENT
Start: 2018-01-28 | End: 2018-02-07

## 2018-01-28 RX ORDER — NITROFURANTOIN 25; 75 MG/1; MG/1
100 CAPSULE ORAL 2 TIMES DAILY
Qty: 14 CAPSULE | Refills: 0 | Status: SHIPPED | OUTPATIENT
Start: 2018-01-28 | End: 2018-02-04

## 2018-01-29 LAB — BACTERIA SPEC AEROBE CULT: NORMAL

## 2018-01-29 NOTE — PROGRESS NOTES
Sensitivities back on UTI show sensitive to macrobid. Keflex (originally prescribed) is not listed. RX for macrobid to pharmacy. Patient notified by phone .    Kiah Koch MD, PhD  OBGYN, PGY-2

## 2018-01-29 NOTE — TELEPHONE ENCOUNTER
Left message with patient regarding UTI and need for antibiotics. Rx sent to pharmacy. Emphasized need to finish entire course of abx.

## 2018-02-05 ENCOUNTER — ROUTINE PRENATAL (OUTPATIENT)
Dept: OBSTETRICS AND GYNECOLOGY | Facility: CLINIC | Age: 28
End: 2018-02-05
Payer: COMMERCIAL

## 2018-02-05 VITALS
SYSTOLIC BLOOD PRESSURE: 120 MMHG | DIASTOLIC BLOOD PRESSURE: 70 MMHG | BODY MASS INDEX: 41.29 KG/M2 | WEIGHT: 225.75 LBS

## 2018-02-05 DIAGNOSIS — Z34.83 PRENATAL CARE, SUBSEQUENT PREGNANCY IN THIRD TRIMESTER: ICD-10-CM

## 2018-02-05 DIAGNOSIS — Z98.891 HISTORY OF C-SECTION: Primary | ICD-10-CM

## 2018-02-05 PROCEDURE — 99999 PR PBB SHADOW E&M-EST. PATIENT-LVL II: CPT | Mod: PBBFAC,,, | Performed by: OBSTETRICS & GYNECOLOGY

## 2018-02-05 PROCEDURE — 0502F SUBSEQUENT PRENATAL CARE: CPT | Mod: S$GLB,,, | Performed by: OBSTETRICS & GYNECOLOGY

## 2018-02-05 NOTE — PROGRESS NOTES
Last week she had contraction.  She went to [employer] she was having contraction, but was not dilated

## 2018-02-14 ENCOUNTER — ROUTINE PRENATAL (OUTPATIENT)
Dept: OBSTETRICS AND GYNECOLOGY | Facility: CLINIC | Age: 28
End: 2018-02-14
Payer: COMMERCIAL

## 2018-02-14 VITALS
WEIGHT: 229.75 LBS | SYSTOLIC BLOOD PRESSURE: 120 MMHG | BODY MASS INDEX: 42.02 KG/M2 | DIASTOLIC BLOOD PRESSURE: 80 MMHG

## 2018-02-14 DIAGNOSIS — Z98.891 HISTORY OF C-SECTION: ICD-10-CM

## 2018-02-14 DIAGNOSIS — Z34.83 PRENATAL CARE, SUBSEQUENT PREGNANCY IN THIRD TRIMESTER: Primary | ICD-10-CM

## 2018-02-14 PROCEDURE — 0502F SUBSEQUENT PRENATAL CARE: CPT | Mod: S$GLB,,, | Performed by: OBSTETRICS & GYNECOLOGY

## 2018-02-14 PROCEDURE — 99999 PR PBB SHADOW E&M-EST. PATIENT-LVL III: CPT | Mod: PBBFAC,,, | Performed by: OBSTETRICS & GYNECOLOGY

## 2018-02-22 ENCOUNTER — ROUTINE PRENATAL (OUTPATIENT)
Dept: OBSTETRICS AND GYNECOLOGY | Facility: CLINIC | Age: 28
End: 2018-02-22
Payer: COMMERCIAL

## 2018-02-22 VITALS
WEIGHT: 228.19 LBS | BODY MASS INDEX: 41.73 KG/M2 | SYSTOLIC BLOOD PRESSURE: 118 MMHG | DIASTOLIC BLOOD PRESSURE: 56 MMHG

## 2018-02-22 DIAGNOSIS — Z34.83 PRENATAL CARE, SUBSEQUENT PREGNANCY IN THIRD TRIMESTER: Primary | ICD-10-CM

## 2018-02-22 DIAGNOSIS — Z98.891 HISTORY OF C-SECTION: ICD-10-CM

## 2018-02-22 PROCEDURE — 99999 PR PBB SHADOW E&M-EST. PATIENT-LVL III: CPT | Mod: PBBFAC,,, | Performed by: OBSTETRICS & GYNECOLOGY

## 2018-02-22 PROCEDURE — 0502F SUBSEQUENT PRENATAL CARE: CPT | Mod: S$GLB,,, | Performed by: OBSTETRICS & GYNECOLOGY

## 2018-02-22 NOTE — PROGRESS NOTES
Vaginal pain and pressure she had a couple of contraction lasting 7 -10 mins and they were 30 mins apart

## 2018-02-26 ENCOUNTER — SURGERY (OUTPATIENT)
Age: 28
End: 2018-02-26

## 2018-02-26 ENCOUNTER — ANESTHESIA EVENT (OUTPATIENT)
Dept: OBSTETRICS AND GYNECOLOGY | Facility: HOSPITAL | Age: 28
End: 2018-02-26
Payer: COMMERCIAL

## 2018-02-26 ENCOUNTER — ANESTHESIA (OUTPATIENT)
Dept: OBSTETRICS AND GYNECOLOGY | Facility: HOSPITAL | Age: 28
End: 2018-02-26
Payer: COMMERCIAL

## 2018-02-26 ENCOUNTER — HOSPITAL ENCOUNTER (INPATIENT)
Facility: HOSPITAL | Age: 28
LOS: 3 days | Discharge: HOME | End: 2018-03-01
Attending: OBSTETRICS & GYNECOLOGY | Admitting: OBSTETRICS & GYNECOLOGY
Payer: COMMERCIAL

## 2018-02-26 DIAGNOSIS — Z3A.39 39 WEEKS GESTATION OF PREGNANCY: ICD-10-CM

## 2018-02-26 LAB
ABO + RH BLD: NORMAL
BASOPHILS # BLD AUTO: 0.01 K/UL
BASOPHILS NFR BLD: 0.1 %
BLD GP AB SCN CELLS X3 SERPL QL: NORMAL
DIFFERENTIAL METHOD: ABNORMAL
EOSINOPHIL # BLD AUTO: 0 K/UL
EOSINOPHIL NFR BLD: 0.3 %
ERYTHROCYTE [DISTWIDTH] IN BLOOD BY AUTOMATED COUNT: 15.6 %
HCT VFR BLD AUTO: 32.7 %
HGB BLD-MCNC: 10.2 G/DL
LYMPHOCYTES # BLD AUTO: 1.7 K/UL
LYMPHOCYTES NFR BLD: 17.5 %
MCH RBC QN AUTO: 24.1 PG
MCHC RBC AUTO-ENTMCNC: 31.2 G/DL
MCV RBC AUTO: 77 FL
MONOCYTES # BLD AUTO: 0.8 K/UL
MONOCYTES NFR BLD: 8.3 %
NEUTROPHILS # BLD AUTO: 7 K/UL
NEUTROPHILS NFR BLD: 73.2 %
PLATELET # BLD AUTO: 174 K/UL
PMV BLD AUTO: 10.2 FL
RBC # BLD AUTO: 4.23 M/UL
WBC # BLD AUTO: 9.5 K/UL

## 2018-02-26 PROCEDURE — 36000685 HC CESAREAN SECTION LEVEL I

## 2018-02-26 PROCEDURE — 25000003 PHARM REV CODE 250: Performed by: OBSTETRICS & GYNECOLOGY

## 2018-02-26 PROCEDURE — 37000009 HC ANESTHESIA EA ADD 15 MINS: Performed by: OBSTETRICS & GYNECOLOGY

## 2018-02-26 PROCEDURE — 59510 CESAREAN DELIVERY: CPT | Mod: GB,,, | Performed by: OBSTETRICS & GYNECOLOGY

## 2018-02-26 PROCEDURE — 25000003 PHARM REV CODE 250: Performed by: ANESTHESIOLOGY

## 2018-02-26 PROCEDURE — 25000003 PHARM REV CODE 250: Performed by: EMERGENCY MEDICINE

## 2018-02-26 PROCEDURE — 63600175 PHARM REV CODE 636 W HCPCS: Performed by: ANESTHESIOLOGY

## 2018-02-26 PROCEDURE — 85025 COMPLETE CBC W/AUTO DIFF WBC: CPT

## 2018-02-26 PROCEDURE — S0028 INJECTION, FAMOTIDINE, 20 MG: HCPCS | Performed by: EMERGENCY MEDICINE

## 2018-02-26 PROCEDURE — 27201121 HC TRAY,SPINAL-HYPERBARIC: Performed by: EMERGENCY MEDICINE

## 2018-02-26 PROCEDURE — 11000001 HC ACUTE MED/SURG PRIVATE ROOM

## 2018-02-26 PROCEDURE — 86592 SYPHILIS TEST NON-TREP QUAL: CPT

## 2018-02-26 PROCEDURE — 63600175 PHARM REV CODE 636 W HCPCS: Performed by: EMERGENCY MEDICINE

## 2018-02-26 PROCEDURE — 51702 INSERT TEMP BLADDER CATH: CPT

## 2018-02-26 PROCEDURE — 37000008 HC ANESTHESIA 1ST 15 MINUTES: Performed by: OBSTETRICS & GYNECOLOGY

## 2018-02-26 PROCEDURE — 86901 BLOOD TYPING SEROLOGIC RH(D): CPT

## 2018-02-26 RX ORDER — SODIUM CHLORIDE, SODIUM LACTATE, POTASSIUM CHLORIDE, CALCIUM CHLORIDE 600; 310; 30; 20 MG/100ML; MG/100ML; MG/100ML; MG/100ML
INJECTION, SOLUTION INTRAVENOUS CONTINUOUS PRN
Status: DISCONTINUED | OUTPATIENT
Start: 2018-02-26 | End: 2018-02-26

## 2018-02-26 RX ORDER — OXYTOCIN/RINGER'S LACTATE 20/1000 ML
PLASTIC BAG, INJECTION (ML) INTRAVENOUS
Status: DISPENSED
Start: 2018-02-26 | End: 2018-02-27

## 2018-02-26 RX ORDER — METHYLERGONOVINE MALEATE 0.2 MG/ML
200 INJECTION INTRAVENOUS
Status: DISCONTINUED | OUTPATIENT
Start: 2018-02-26 | End: 2018-03-01 | Stop reason: HOSPADM

## 2018-02-26 RX ORDER — OXYTOCIN 10 [USP'U]/ML
INJECTION, SOLUTION INTRAMUSCULAR; INTRAVENOUS
Status: DISCONTINUED | OUTPATIENT
Start: 2018-02-26 | End: 2018-02-26

## 2018-02-26 RX ORDER — PHENYLEPHRINE HYDROCHLORIDE 10 MG/ML
INJECTION INTRAVENOUS
Status: DISCONTINUED | OUTPATIENT
Start: 2018-02-26 | End: 2018-02-26

## 2018-02-26 RX ORDER — HYDROMORPHONE HYDROCHLORIDE 2 MG/ML
0.2 INJECTION, SOLUTION INTRAMUSCULAR; INTRAVENOUS; SUBCUTANEOUS
Status: DISCONTINUED | OUTPATIENT
Start: 2018-02-26 | End: 2018-03-01 | Stop reason: HOSPADM

## 2018-02-26 RX ORDER — ONDANSETRON 8 MG/1
8 TABLET, ORALLY DISINTEGRATING ORAL ONCE
Status: COMPLETED | OUTPATIENT
Start: 2018-02-26 | End: 2018-02-26

## 2018-02-26 RX ORDER — PROMETHAZINE HYDROCHLORIDE 25 MG/ML
INJECTION, SOLUTION INTRAMUSCULAR; INTRAVENOUS
Status: DISPENSED
Start: 2018-02-26 | End: 2018-02-27

## 2018-02-26 RX ORDER — SODIUM CHLORIDE, SODIUM LACTATE, POTASSIUM CHLORIDE, CALCIUM CHLORIDE 600; 310; 30; 20 MG/100ML; MG/100ML; MG/100ML; MG/100ML
INJECTION, SOLUTION INTRAVENOUS CONTINUOUS
Status: DISCONTINUED | OUTPATIENT
Start: 2018-02-26 | End: 2018-03-01 | Stop reason: HOSPADM

## 2018-02-26 RX ORDER — HYDROCORTISONE 25 MG/G
CREAM TOPICAL 3 TIMES DAILY PRN
Status: DISCONTINUED | OUTPATIENT
Start: 2018-02-26 | End: 2018-03-01 | Stop reason: HOSPADM

## 2018-02-26 RX ORDER — DOCUSATE SODIUM 100 MG/1
200 CAPSULE, LIQUID FILLED ORAL 2 TIMES DAILY
Status: DISCONTINUED | OUTPATIENT
Start: 2018-02-26 | End: 2018-03-01 | Stop reason: HOSPADM

## 2018-02-26 RX ORDER — OXYCODONE HYDROCHLORIDE 5 MG/1
5 TABLET ORAL EVERY 4 HOURS PRN
Status: DISCONTINUED | OUTPATIENT
Start: 2018-02-26 | End: 2018-03-01 | Stop reason: HOSPADM

## 2018-02-26 RX ORDER — FENTANYL CITRATE 50 UG/ML
INJECTION, SOLUTION INTRAMUSCULAR; INTRAVENOUS
Status: DISCONTINUED | OUTPATIENT
Start: 2018-02-26 | End: 2018-02-26

## 2018-02-26 RX ORDER — AMOXICILLIN 250 MG
1 CAPSULE ORAL NIGHTLY PRN
Status: DISCONTINUED | OUTPATIENT
Start: 2018-02-26 | End: 2018-03-01 | Stop reason: HOSPADM

## 2018-02-26 RX ORDER — OXYCODONE HYDROCHLORIDE 5 MG/1
10 TABLET ORAL EVERY 4 HOURS PRN
Status: DISCONTINUED | OUTPATIENT
Start: 2018-02-26 | End: 2018-03-01 | Stop reason: HOSPADM

## 2018-02-26 RX ORDER — ONDANSETRON 2 MG/ML
4 INJECTION INTRAMUSCULAR; INTRAVENOUS EVERY 6 HOURS PRN
Status: DISCONTINUED | OUTPATIENT
Start: 2018-02-26 | End: 2018-03-01 | Stop reason: HOSPADM

## 2018-02-26 RX ORDER — KETOROLAC TROMETHAMINE 30 MG/ML
30 INJECTION, SOLUTION INTRAMUSCULAR; INTRAVENOUS EVERY 6 HOURS
Status: DISPENSED | OUTPATIENT
Start: 2018-02-26 | End: 2018-02-27

## 2018-02-26 RX ORDER — MISOPROSTOL 200 UG/1
800 TABLET ORAL
Status: DISCONTINUED | OUTPATIENT
Start: 2018-02-26 | End: 2018-03-01 | Stop reason: HOSPADM

## 2018-02-26 RX ORDER — FAMOTIDINE 10 MG/ML
20 INJECTION INTRAVENOUS ONCE
Status: COMPLETED | OUTPATIENT
Start: 2018-02-26 | End: 2018-02-26

## 2018-02-26 RX ORDER — CARBOPROST TROMETHAMINE 250 UG/ML
250 INJECTION, SOLUTION INTRAMUSCULAR
Status: DISCONTINUED | OUTPATIENT
Start: 2018-02-26 | End: 2018-03-01 | Stop reason: HOSPADM

## 2018-02-26 RX ORDER — OXYCODONE AND ACETAMINOPHEN 10; 325 MG/1; MG/1
1 TABLET ORAL EVERY 4 HOURS PRN
Status: DISCONTINUED | OUTPATIENT
Start: 2018-02-26 | End: 2018-03-01 | Stop reason: HOSPADM

## 2018-02-26 RX ORDER — DIPHENHYDRAMINE HCL 25 MG
25 CAPSULE ORAL EVERY 4 HOURS PRN
Status: DISCONTINUED | OUTPATIENT
Start: 2018-02-26 | End: 2018-03-01 | Stop reason: HOSPADM

## 2018-02-26 RX ORDER — MUPIROCIN 20 MG/G
OINTMENT TOPICAL
Status: DISCONTINUED | OUTPATIENT
Start: 2018-02-26 | End: 2018-03-01 | Stop reason: HOSPADM

## 2018-02-26 RX ORDER — SODIUM CITRATE AND CITRIC ACID MONOHYDRATE 334; 500 MG/5ML; MG/5ML
30 SOLUTION ORAL ONCE
Status: COMPLETED | OUTPATIENT
Start: 2018-02-26 | End: 2018-02-26

## 2018-02-26 RX ORDER — BUPIVACAINE HYDROCHLORIDE 2.5 MG/ML
30 INJECTION, SOLUTION EPIDURAL; INFILTRATION; INTRACAUDAL ONCE
Status: DISCONTINUED | OUTPATIENT
Start: 2018-02-26 | End: 2018-03-01 | Stop reason: HOSPADM

## 2018-02-26 RX ORDER — ADHESIVE BANDAGE
30 BANDAGE TOPICAL 2 TIMES DAILY PRN
Status: DISCONTINUED | OUTPATIENT
Start: 2018-02-27 | End: 2018-03-01 | Stop reason: HOSPADM

## 2018-02-26 RX ORDER — NALBUPHINE HYDROCHLORIDE 20 MG/ML
5 INJECTION, SOLUTION INTRAMUSCULAR; INTRAVENOUS; SUBCUTANEOUS ONCE
Status: COMPLETED | OUTPATIENT
Start: 2018-02-26 | End: 2018-02-26

## 2018-02-26 RX ORDER — NALBUPHINE HYDROCHLORIDE 20 MG/ML
2.5 INJECTION, SOLUTION INTRAMUSCULAR; INTRAVENOUS; SUBCUTANEOUS
Status: DISCONTINUED | OUTPATIENT
Start: 2018-02-26 | End: 2018-03-01 | Stop reason: HOSPADM

## 2018-02-26 RX ORDER — SODIUM CITRATE AND CITRIC ACID MONOHYDRATE 334; 500 MG/5ML; MG/5ML
30 SOLUTION ORAL
Status: DISCONTINUED | OUTPATIENT
Start: 2018-02-26 | End: 2018-03-01 | Stop reason: HOSPADM

## 2018-02-26 RX ORDER — CEFAZOLIN SODIUM 2 G/50ML
2 SOLUTION INTRAVENOUS
Status: DISCONTINUED | OUTPATIENT
Start: 2018-02-26 | End: 2018-03-01 | Stop reason: HOSPADM

## 2018-02-26 RX ORDER — OXYCODONE AND ACETAMINOPHEN 5; 325 MG/1; MG/1
1 TABLET ORAL EVERY 4 HOURS PRN
Status: DISCONTINUED | OUTPATIENT
Start: 2018-02-26 | End: 2018-03-01 | Stop reason: HOSPADM

## 2018-02-26 RX ORDER — MORPHINE SULFATE 1 MG/ML
INJECTION, SOLUTION EPIDURAL; INTRATHECAL; INTRAVENOUS
Status: DISCONTINUED | OUTPATIENT
Start: 2018-02-26 | End: 2018-02-26

## 2018-02-26 RX ORDER — ACETAMINOPHEN 10 MG/ML
1000 INJECTION, SOLUTION INTRAVENOUS ONCE
Status: COMPLETED | OUTPATIENT
Start: 2018-02-26 | End: 2018-02-26

## 2018-02-26 RX ORDER — FAMOTIDINE 10 MG/ML
20 INJECTION INTRAVENOUS
Status: DISCONTINUED | OUTPATIENT
Start: 2018-02-26 | End: 2018-03-01 | Stop reason: HOSPADM

## 2018-02-26 RX ORDER — ONDANSETRON HYDROCHLORIDE 2 MG/ML
INJECTION, SOLUTION INTRAMUSCULAR; INTRAVENOUS
Status: DISCONTINUED | OUTPATIENT
Start: 2018-02-26 | End: 2018-02-26

## 2018-02-26 RX ORDER — BISACODYL 10 MG
10 SUPPOSITORY, RECTAL RECTAL ONCE AS NEEDED
Status: ACTIVE | OUTPATIENT
Start: 2018-02-26 | End: 2018-02-26

## 2018-02-26 RX ORDER — ONDANSETRON 8 MG/1
8 TABLET, ORALLY DISINTEGRATING ORAL EVERY 8 HOURS PRN
Status: DISCONTINUED | OUTPATIENT
Start: 2018-02-26 | End: 2018-03-01 | Stop reason: HOSPADM

## 2018-02-26 RX ORDER — SODIUM CHLORIDE, SODIUM LACTATE, POTASSIUM CHLORIDE, CALCIUM CHLORIDE 600; 310; 30; 20 MG/100ML; MG/100ML; MG/100ML; MG/100ML
INJECTION, SOLUTION INTRAVENOUS CONTINUOUS
Status: ACTIVE | OUTPATIENT
Start: 2018-02-26 | End: 2018-02-27

## 2018-02-26 RX ORDER — SIMETHICONE 80 MG
1 TABLET,CHEWABLE ORAL EVERY 6 HOURS PRN
Status: DISCONTINUED | OUTPATIENT
Start: 2018-02-26 | End: 2018-03-01 | Stop reason: HOSPADM

## 2018-02-26 RX ORDER — NAPROXEN 500 MG/1
500 TABLET ORAL EVERY 8 HOURS PRN
Status: DISCONTINUED | OUTPATIENT
Start: 2018-02-26 | End: 2018-03-01 | Stop reason: HOSPADM

## 2018-02-26 RX ORDER — MUPIROCIN 20 MG/G
1 OINTMENT TOPICAL 2 TIMES DAILY
Status: DISCONTINUED | OUTPATIENT
Start: 2018-02-26 | End: 2018-03-01 | Stop reason: HOSPADM

## 2018-02-26 RX ORDER — OXYTOCIN/RINGER'S LACTATE 20/1000 ML
41.65 PLASTIC BAG, INJECTION (ML) INTRAVENOUS CONTINUOUS
Status: ACTIVE | OUTPATIENT
Start: 2018-02-26 | End: 2018-02-27

## 2018-02-26 RX ORDER — ACETAMINOPHEN 325 MG/1
650 TABLET ORAL EVERY 6 HOURS
Status: DISPENSED | OUTPATIENT
Start: 2018-02-26 | End: 2018-02-27

## 2018-02-26 RX ADMIN — PHENYLEPHRINE HYDROCHLORIDE 200 MCG: 10 INJECTION INTRAVENOUS at 02:02

## 2018-02-26 RX ADMIN — PROMETHAZINE HYDROCHLORIDE 6.25 MG: 25 INJECTION INTRAMUSCULAR; INTRAVENOUS at 03:02

## 2018-02-26 RX ADMIN — SODIUM CHLORIDE, POTASSIUM CHLORIDE, SODIUM LACTATE AND CALCIUM CHLORIDE 1000 ML: 600; 310; 30; 20 INJECTION, SOLUTION INTRAVENOUS at 10:02

## 2018-02-26 RX ADMIN — PHENYLEPHRINE HYDROCHLORIDE 100 MCG: 10 INJECTION INTRAVENOUS at 02:02

## 2018-02-26 RX ADMIN — FAMOTIDINE 20 MG: 10 INJECTION, SOLUTION INTRAVENOUS at 11:02

## 2018-02-26 RX ADMIN — SODIUM CHLORIDE, POTASSIUM CHLORIDE, SODIUM LACTATE AND CALCIUM CHLORIDE 1000 ML: 600; 310; 30; 20 INJECTION, SOLUTION INTRAVENOUS at 11:02

## 2018-02-26 RX ADMIN — ONDANSETRON 4 MG: 2 INJECTION, SOLUTION INTRAMUSCULAR; INTRAVENOUS at 01:02

## 2018-02-26 RX ADMIN — PHENYLEPHRINE HYDROCHLORIDE 200 MCG: 10 INJECTION INTRAVENOUS at 01:02

## 2018-02-26 RX ADMIN — DEXTROSE 2 G: 50 INJECTION, SOLUTION INTRAVENOUS at 01:02

## 2018-02-26 RX ADMIN — MORPHINE SULFATE 0.2 MG: 1 INJECTION, SOLUTION EPIDURAL; INTRATHECAL; INTRAVENOUS at 01:02

## 2018-02-26 RX ADMIN — SODIUM CHLORIDE, SODIUM LACTATE, POTASSIUM CHLORIDE, AND CALCIUM CHLORIDE: .6; .31; .03; .02 INJECTION, SOLUTION INTRAVENOUS at 06:02

## 2018-02-26 RX ADMIN — OXYTOCIN 30 UNITS: 10 INJECTION, SOLUTION INTRAMUSCULAR; INTRAVENOUS at 02:02

## 2018-02-26 RX ADMIN — NALBUPHINE HYDROCHLORIDE 5 MG: 20 INJECTION, SOLUTION INTRAMUSCULAR; INTRAVENOUS; SUBCUTANEOUS at 06:02

## 2018-02-26 RX ADMIN — FENTANYL CITRATE 10 MCG: 50 INJECTION, SOLUTION INTRAMUSCULAR; INTRAVENOUS at 01:02

## 2018-02-26 RX ADMIN — SODIUM CITRATE AND CITRIC ACID MONOHYDRATE 30 ML: 500; 334 SOLUTION ORAL at 11:02

## 2018-02-26 RX ADMIN — PROMETHAZINE HYDROCHLORIDE 12.5 MG: 25 INJECTION INTRAMUSCULAR; INTRAVENOUS at 02:02

## 2018-02-26 RX ADMIN — ONDANSETRON 8 MG: 8 TABLET, ORALLY DISINTEGRATING ORAL at 04:02

## 2018-02-26 RX ADMIN — SODIUM CHLORIDE, SODIUM LACTATE, POTASSIUM CHLORIDE, AND CALCIUM CHLORIDE: .6; .31; .03; .02 INJECTION, SOLUTION INTRAVENOUS at 01:02

## 2018-02-26 RX ADMIN — ACETAMINOPHEN 1000 MG: 10 INJECTION, SOLUTION INTRAVENOUS at 03:02

## 2018-02-26 NOTE — TRANSFER OF CARE
"Anesthesia Transfer of Care Note    Patient: Siobhan Chappell    Procedure(s) Performed: Procedure(s) (LRB):  DELIVERY- SECTION WITH TUBAL (N/A)    Patient location: Labor and Delivery    Anesthesia Type: spinal    Transport from OR: Transported from OR on room air with adequate spontaneous ventilation    Post pain: adequate analgesia    Post assessment: no apparent anesthetic complications    Post vital signs: stable    Level of consciousness: awake, oriented and alert    Nausea/Vomiting: nausea and vomiting    Complications: persistent nausea and vomiting    Transfer of care protocol was followed      Last vitals:   Visit Vitals  /61   Pulse 85   Temp 98.3   Resp 16   Ht 5' 2" (1.575 m)   Wt 103 kg (227 lb)   LMP 2017   SpO2 98%   Breastfeeding? Yes   BMI 41.52 kg/m²     "

## 2018-02-26 NOTE — PLAN OF CARE
0950 Pt arrives on floor for scheduled , pt does not desire a tubal, Pt took a shower with surgical scrub last night. Pt oriented to room, medical history and allergies reviewed. Educated on procedure. Full assessment done, clipped with electrical clippers, CAROLYN wipes used, see flow sheet for assessment and pre-op checklist. Pt and  can recall all information.     1305 Pt assisted up to restroom, reviewed tracing with Dr Su reactive 140 bpm, okay to d/c monitoring until . Alcohol based wipes and then CAROLYN wipes used. Pt updated on time for , should be in the next 30 minutes. Pt and family can understand plan of care.     1345 Pt ambulates to OR2, see operative report and delivery summary, delivery of viable baby girl at 1413 8/9 apgars.     1500 Report received at bedside from Dr Blackman , anesthesia resident, Pt experiencing nausea, given 12.5 mg promethazine in OR before coming to room, may have 12.5 mg more if continues. Recovery plan reviewed and full assessment done.     1600 After second dose of Promethazine, pt still has continuous nausea and emesis. Dr Blackman updated and will consult Dr Hassan.     1605 Orders received to give Zofran ODT 8mg now for nausea relief.     1640 pt still has no relief, cont nausea and emesis. Dr Hassan will come to bedside to check pt.     1715 Pt sleeping, spont and even RR,  at bedside, no more nausea or emesis noted.     1830 Pt still having nausea and vomiting since waking up at 1800. Dr Elizabeth consulted. Orders to give Nubain 5 mg.     1850 Pt given Nubain, Report given to TOM Nieto, will reassess nausea and vomiting and move to mom baby.

## 2018-02-26 NOTE — ANESTHESIA PREPROCEDURE EVALUATION
2018  Siobhan Chappell is a 28 y.o., female  presenting for scheduled  2018. Previous  given failure of labor progression.    Pregnancy uncomplicated.  No results for input(s): WBC, RBC, HGB, HCT, PLT, MCV, MCH, MCHC in the last 24 hours.      Anesthesia Evaluation    I have reviewed the Patient Summary Reports.    I have reviewed the Nursing Notes.      Review of Systems  Anesthesia Hx:  No problems with previous Anesthesia  History of prior surgery of interest to airway management or planning: Previous anesthesia: Spinal  Denies Personal Hx of Anesthesia complications.   Social:  Non-Smoker, No Alcohol Use    Hematology/Oncology:         -- Anemia:   Cardiovascular:  Cardiovascular Normal Exercise tolerance: good     Pulmonary:   Asthma asymptomatic    Musculoskeletal:  Musculoskeletal Normal    Neurological:  Neurology Normal  Denies Headaches.    Endocrine:  Endocrine Normal Denies Diabetes.        Physical Exam  General:  Obesity    Airway/Jaw/Neck:  Airway Findings: Mallampati: III Improves to III with phonation.  TM Distance: Normal, at least 6 cm         Dental:  DENTAL FINDINGS: Normal   Chest/Lungs:  Chest/Lungs Clear    Heart/Vascular:  Heart Findings: Normal            Anesthesia Plan  Type of Anesthesia, risks & benefits discussed:  Anesthesia Type:  spinal, CSE  Patient's Preference:   Intra-op Monitoring Plan:   Intra-op Monitoring Plan Comments:   Post Op Pain Control Plan:   Post Op Pain Control Plan Comments:   Induction:    Beta Blocker:  Patient is not currently on a Beta-Blocker (No further documentation required).       Informed Consent: Patient understands risks and agrees with Anesthesia plan.  Questions answered. Anesthesia consent signed with patient.  ASA Score: 2     Day of Surgery Review of History & Physical:    H&P update referred to the  provider.     Anesthesia Plan Notes: CBC pending.         Ready For Surgery From Anesthesia Perspective.

## 2018-02-26 NOTE — H&P
OBSTETRIC HISTORY:   OB History      Para Term  AB Living    4 3 2 0 1 2    SAB TAB Ectopic Multiple Live Births          0 2           COMPREHENSIVE GYN HISTORY:  PAP History: Denies abnormal Paps.  Infection History: Denies STDs. Denies PID.  Benign History: Denies uterine fibroids. Denies ovarian cysts. Denies endometriosis.   Cancer History: Denies cervical cancer. Denies uterine cancer or hyperplasia. Denies ovarian cancer. Denies vulvar cancer or pre-cancer. Denies vaginal cancer or pre-cancer. Denies breast cancer. Denies colon cancer.  Sexual Activity History:   reports that she currently engages in sexual activity and has had male partners.      review of systems is normal for term pregnancy.  Vital signs are normal  HEENT exam is normal  Chest clear to auscultation  Heart normal sinus rhythm  Uterus is gravid with fundal height approximately 40 and fetal heart tones in the right lower quadrant  Extremities are normal without hyperreflexia or significant edema    Impression: Intrauterine pregnancy near term with previous  section  Plan: Repeat low transverse  section and birth of .

## 2018-02-26 NOTE — OP NOTE
Operative note/delivery note:  Date: 2018  Prep diagnosis: Repeat low transverse  section  Postop diagnosis: Extensive dense pelvic adhesions; anterior placenta; birth of viable female infant.  Surgeon: Hector Su M.D.  Assistant: Oralia De La Rosa  Estimated blood loss: 500 cc  Spinal anesthesia  Viable female infant delivered with good Apgar scores 9/9  Procedure in detail:  Patient was placed on the operating table in supine position with spinal anesthesia.  The abdominal wall was taped to expose previous  section scar under panniculus.  She was prepped and draped and the incision made through the scar tissue from her previous  section and carried through subcutaneous tissues with sharp electrocautery dissection.  The scar tissue is extremely dense and no tissue layers were separable.  The fascia was transversely incised and had to be cut free from the underlying rectus musculature and the recti were fused in the midline and densely scarred.  A midline incision through dense scar tissue was made and extended cautiously.  Undermining the anterior fascia did not provide beneficial exposure for delivery.  The recti were split using the Bovie bilaterally and the uterus is identified.  Extensive adhesions and scar tissue were encountered on the anterior surface of the uterus this was cleared to allow a transverse uterine incision to be made.  This was made with a knife and extended bluntly.  Placental tissue was immediately encountered.  Infant was delivered through the incision with fundal pressure as a right occiput transverse presentation viable female infant.  The cord was doubly clamped and  the baby was passed off to the pediatric team who given 9/9 Apgar scores.  Placenta was manually extracted and removed from the operative field.  The uterus could not be exteriorized due to adhesions and scar tissue.  Retained clots and tissue were removed from the endometrial cavity  and the uterus was closed with a running interlocked #1 chromic suture from the right corner to the midline and then from left corner to the midline additional figure-of-eight sutures were placed in the corners for hemostasis and at this point the abdominal wall closure was begun.  The rectus musculature was reapproximated on either side and then closed across the midline.  No peritoneal layer could be identified.  The fascia was then closed with a running interlocked #1 Vicryl suture and the subcutaneous fat was reapproximated with 20 plain.  A Monocryl 3-0 subcutaneous stitch was then placed in the aqua cell bandage was applied.  Patient tolerated procedure well except for some nausea intraoperatively.  She went to the recovery area in stable satisfactory condition with no apparent complications from the surgery of the anesthesia.

## 2018-02-26 NOTE — ANESTHESIA PROCEDURE NOTES
Spinal    Diagnosis: pregnancy  Patient location during procedure: OR  Start time: 2/26/2018 1:51 PM  Timeout: 2/26/2018 1:50 PM  End time: 2/26/2018 1:58 PM  Staffing  Anesthesiologist: ALEXIS STRANGE  Resident/CRNA: MELVIN GALLO  Performed: resident/CRNA   Preanesthetic Checklist  Completed: patient identified, site marked, surgical consent, pre-op evaluation, timeout performed, IV checked, risks and benefits discussed and monitors and equipment checked  Spinal Block  Patient position: sitting  Prep: ChloraPrep  Patient monitoring: heart rate, cardiac monitor and continuous pulse ox  Approach: midline  Location: L3-4  Injection technique: single shot  CSF Fluid: clear free-flowing CSF  Needle  Needle type: pencil-tip   Needle gauge: 25 G  Needle length: 3.5 in  Additional Documentation: negative aspiration for heme and no paresthesia on injection  Needle localization: anatomical landmarks  Assessment  Sensory level: T6   Dermatomal levels determined by alcohol wipe  Ease of block: easy  Patient's tolerance of the procedure: comfortable throughout block and no complaints  Medications:  Bolus administered: 1.6 mL of 0.75 bupivacaine fentanyl and morphine (10mcg fentanyl, 200mcg morphine )

## 2018-02-26 NOTE — PLAN OF CARE
Problem: Patient Care Overview  Goal: Plan of Care Review  Outcome: Ongoing (interventions implemented as appropriate)   scheduled  at 1200. Full assessment done educated on procedure and recovery.

## 2018-02-26 NOTE — PLAN OF CARE
Problem: Infection, Risk/Actual (Adult)  Goal: Identify Related Risk Factors and Signs and Symptoms  Related risk factors and signs and symptoms are identified upon initiation of Human Response Clinical Practice Guideline (CPG)   Outcome: Ongoing (interventions implemented as appropriate)  Pt used surgical prep night and morning of procedure, Pt clipped in OBS, CAROLYN wipes used. Sterile tech used.

## 2018-02-27 LAB
BASOPHILS # BLD AUTO: 0.02 K/UL
BASOPHILS NFR BLD: 0.1 %
DIFFERENTIAL METHOD: ABNORMAL
EOSINOPHIL # BLD AUTO: 0 K/UL
EOSINOPHIL NFR BLD: 0.1 %
ERYTHROCYTE [DISTWIDTH] IN BLOOD BY AUTOMATED COUNT: 15.7 %
HCT VFR BLD AUTO: 26.9 %
HGB BLD-MCNC: 8.4 G/DL
LYMPHOCYTES # BLD AUTO: 1.5 K/UL
LYMPHOCYTES NFR BLD: 10.1 %
MCH RBC QN AUTO: 24.3 PG
MCHC RBC AUTO-ENTMCNC: 31.2 G/DL
MCV RBC AUTO: 78 FL
MONOCYTES # BLD AUTO: 1.1 K/UL
MONOCYTES NFR BLD: 7.5 %
NEUTROPHILS # BLD AUTO: 12.3 K/UL
NEUTROPHILS NFR BLD: 81.9 %
PLATELET # BLD AUTO: 154 K/UL
PMV BLD AUTO: 10 FL
RBC # BLD AUTO: 3.46 M/UL
RPR SER QL: NORMAL
WBC # BLD AUTO: 15.02 K/UL

## 2018-02-27 PROCEDURE — 63600175 PHARM REV CODE 636 W HCPCS: Performed by: EMERGENCY MEDICINE

## 2018-02-27 PROCEDURE — 85025 COMPLETE CBC W/AUTO DIFF WBC: CPT

## 2018-02-27 PROCEDURE — 25000003 PHARM REV CODE 250: Performed by: OBSTETRICS & GYNECOLOGY

## 2018-02-27 PROCEDURE — 25000003 PHARM REV CODE 250: Performed by: EMERGENCY MEDICINE

## 2018-02-27 PROCEDURE — 36415 COLL VENOUS BLD VENIPUNCTURE: CPT

## 2018-02-27 PROCEDURE — 11000001 HC ACUTE MED/SURG PRIVATE ROOM

## 2018-02-27 RX ADMIN — DOCUSATE SODIUM 200 MG: 100 CAPSULE, LIQUID FILLED ORAL at 09:02

## 2018-02-27 RX ADMIN — NAPROXEN 500 MG: 500 TABLET ORAL at 10:02

## 2018-02-27 RX ADMIN — OXYCODONE HYDROCHLORIDE AND ACETAMINOPHEN 1 TABLET: 10; 325 TABLET ORAL at 06:02

## 2018-02-27 RX ADMIN — OXYCODONE HYDROCHLORIDE AND ACETAMINOPHEN 1 TABLET: 10; 325 TABLET ORAL at 10:02

## 2018-02-27 RX ADMIN — NAPROXEN 500 MG: 500 TABLET ORAL at 11:02

## 2018-02-27 RX ADMIN — OXYCODONE HYDROCHLORIDE AND ACETAMINOPHEN 1 TABLET: 10; 325 TABLET ORAL at 11:02

## 2018-02-27 RX ADMIN — ACETAMINOPHEN 650 MG: 325 TABLET ORAL at 05:02

## 2018-02-27 RX ADMIN — KETOROLAC TROMETHAMINE 30 MG: 30 INJECTION, SOLUTION INTRAMUSCULAR at 05:02

## 2018-02-27 RX ADMIN — SIMETHICONE CHEW TAB 80 MG 80 MG: 80 TABLET ORAL at 09:02

## 2018-02-27 NOTE — PROGRESS NOTES
"POD #1 s/p     Subjective: No complaints. Doing well. Son removed this AM. No flatus. Tolerating diet.     Objective: /63 (BP Location: Right arm, Patient Position: Lying)   Pulse 91   Temp 98.2 °F (36.8 °C) (Oral)   Resp 18   Ht 5' 2" (1.575 m)   Wt 103 kg (227 lb)   LMP 2017   SpO2 99%   Breastfeeding? Yes   BMI 41.52 kg/m²     I/O last 3 completed shifts:  In: 900 [I.V.:900]  Out: 1065 [Urine:1065]  No intake/output data recorded.        H/H:   Lab Results   Component Value Date    WBC 15.02 (H) 2018    HGB 8.4 (L) 2018    HCT 26.9 (L) 2018    MCV 78 (L) 2018     2018       Chest: Clear to auscultation  CV: Regular rate and rhythm  Abdomen: Non-tender, non-distended, soft, positive bowel sounds  Incision: Clean, dry, intact  Extremities: Non-tender, no edema    Assessment:POD #1 S/P     Plan: Routine progressive care  OOB with ambulation      Davida To MD      "

## 2018-02-27 NOTE — LACTATION NOTE
02/27/18 1005   Infant Information   Infant's Name Regina   Infant's Medical Care Provider Gurinder Garza   Maternal Infant Assessment   Breast Size Issue none   Breast Shape Bilateral:;pendulous   Breast Density Bilateral:;soft   Areola Bilateral:;elastic   Nipple(s) Bilateral:;everted;other (see comments);graspable  (nipps mukund w/ stimulation,graspable,moldable)   Nipple Symptoms bilateral:;other (see comments)  (denies redness or tenderness to nipples)   Infant Assessment   Mouth Size average   Sucking Reflex present   Rooting Reflex present   Swallow Reflex present   LATCH Score   Latch 2-->grasps breast, tongue down, lips flanged, rhythmic sucking   Audible Swallowing 2-->spontaneous and intermittent (24 hrs old)   Type Of Nipple 2-->everted (after stimulation)   Comfort (Breast/Nipple) 2-->soft/nontender   Hold (Positioning) 1-->minimal assist, teach one side: mother does other, staff holds   Score (less than 7 for 2/more consecutive times, consult Lactation Consultant) 9   Breasts WDL   Breasts WDL WDL   Pain/Comfort Assessments   Pain Assessment Performed Yes       Number Scale   Presence of Pain denies   Location - Side Bilateral   Location nipple(s)   Pain Rating: Rest 0   Pain Rating: Activity 0  (denies pain to nipples when BF after latch)   Maternal Infant Feeding   Maternal Preparation breast care;hand hygiene   Maternal Emotional State relaxed   Infant Positioning cross-cradle  (right cross cradle hold,deep latch)   Signs of Milk Transfer audible swallow;infant jaw motion present   Presence of Pain no   Time Spent (min) 30-60 min   Latch Assistance yes   Engorgement Measures complete emptying encouraged;supportive bra encouraged   Breastfeeding Education adequate infant intake;adequate milk volume;importance of skin-to-skin contact;increasing milk supply;milk expression, hand;other (see comments)  (BFG given,posit.,s/d,s2s,fdg.freq/juvenal,I&O,benefits,etc.)   Breastfeeding History   Breastfeeding  History no  (has not BF before)   Infant First Feeding   Skin-to-Skin Contact, Duration continued   Feeding Infant   Feeding Readiness Cues sustained alertness;sucking motion present;rooting;hand to mouth movements;eager   Feeding Tolerance/Success sustained alertness;strong suck;rooting;eager;coordinated swallow;coordinated suck;alert for feeding   Effective Latch During Feeding yes   Audible Swallow yes   Suck/Swallow Coordination present   Skin-to-Skin Contact During Feeding yes   Lactation Referrals   Lactation Consult Initial assessment;Knowledge deficit;Breastfeeding assessment   Lactation Interventions   Attachment Promotion skin-to-skin contact encouraged;rooming-in promoted;role responsibility promoted;privacy provided;infant-mother separation minimized;family involvement promoted;face-to-face positioning promoted;environment adjusted;breastfeeding assistance provided;counseling provided   Breastfeeding Assistance support offered;assisted with positioning;feeding cue recognition promoted;feeding on demand promoted;feeding session observed;infant latch-on verified;infant stimulated to wakeful state;infant suck/swallow verified   Maternal Breastfeeding Support diary/feeding log utilized;encouragement offered;infant-mother separation minimized;lactation counseling provided   Latch Promotion positioning assisted;infant moved to breast;suck stimulated with colostrum drop

## 2018-02-27 NOTE — PHYSICIAN QUERY
"PT Name: Siobhan Chappell  MR #: [id number]    Physician Query Form - Hematology Clarification      CDS/: TED Lawson,RNC-MNN           Contact information:[email or web address]    This form is a permanent document in the medical record.      Query Date: 2018    By submitting this query, we are merely seeking further clarification of documentation. Please utilize your independent clinical judgment when addressing the question(s) below.    The Medical record contains the following:   Indicators  Supporting Clinical Findings Location in Medical Record   X "Anemia" documented Anemia Anesthesia note    X H & H = Hgb=8.4-10.2  Hct=26.9-32.7 LAB -    BP =                     HR=      "GI bleeding" documented     X Acute bleeding (Non GI site) Estimated blood loss: 500 cc Op note     Transfusion(s)      Treatment:     X Other:  Prep diagnosis: Repeat low transverse  section Op note      Provider, please specify diagnosis or diagnoses associated with above clinical findings.    [  ] Acute blood loss anemia expected post-operatively  [  ] Acute blood loss anemia  [  ] Other Hematological Diagnosis (please specify): _________________________________  [  ] Clinically Undetermined       Please document in your progress notes daily for the duration of treatment, until resolved, and include in your discharge summary.       This is Dr. Silva pt                                                                                               "

## 2018-02-27 NOTE — PLAN OF CARE
Problem: Breastfeeding (Adult,Obstetrics,Pediatric)  Goal: Signs and Symptoms of Listed Potential Problems Will be Absent, Minimized or Managed (Breastfeeding)  Signs and symptoms of listed potential problems will be absent, minimized or managed by discharge/transition of care (reference Breastfeeding (Adult,Obstetrics,Pediatric) CPG).   Outcome: Ongoing (interventions implemented as appropriate)  Mother will breastfeed 8 or more times in 24 hours and on cue.  She will do lots of skin to skin before feedings and between feedings.  She will monitor baby for signs of an adequate feeding. She will follow up with pediatrician as instructed.   She will call [facility] for any needs or concerns.

## 2018-02-27 NOTE — PLAN OF CARE
0800 - vss, nad, reports pain/discomfort is tolerable at this time, reviewed pain meds w/pt, pt verbalized understanding, tolerating clear diet, denies any n/v.  Poc: pain management as needed, DTV @ 1215, encouraged po hydration.  Reviewed poc w/pt.  Pt verbalized understanding.    0830 - reviewed supply and demand w/pt.  Encouraged pt to breastfeed first and supplement w/formula if needed.  Encouraged pt to call for breastfeeding assistance if needed.  Explained and demonstrated hand expression w/pt.  Pt was able to easily hand express colostrum.  Discussed signs of effective latch.  Pt verbalized understanding.    0915 - pt ambulated to restroom w/o difficulty and voided 350 ml.

## 2018-02-27 NOTE — PLAN OF CARE
() - Received report from ANDREINA Hurley, POLLO,  and assumed care of patient of Dr. Su, 27 y/o , s/p Repeat  at 14:13, of F/C, 8/9 APGARS, at 8# 6.6 ozs, currently in open crib at bedside, significant other also at bedside.  VSS.  Patient with History of emesis this episode, after receiving anesthesia meds (past history).  Nursing assessment initiated.  See flow sheets for complete and continuing assessments.  () - Patient transferred to room 301, report given to BULMARO Montenegro RN.

## 2018-02-27 NOTE — ANESTHESIA POSTPROCEDURE EVALUATION
"Anesthesia Post Evaluation    Patient: Siobhan Chappell    Procedure(s) Performed: Procedure(s) (LRB):  DELIVERY- SECTION WITH TUBAL (N/A)    Final Anesthesia Type: spinal  Patient location during evaluation: labor & delivery  Patient participation: Yes- Able to Participate  Level of consciousness: awake and alert and oriented  Post-procedure vital signs: reviewed and stable  Pain management: adequate  Airway patency: patent  PONV status at discharge: No PONV  Anesthetic complications: no      Cardiovascular status: blood pressure returned to baseline and hemodynamically stable  Respiratory status: unassisted, spontaneous ventilation and room air  Hydration status: euvolemic  Follow-up not needed.        Visit Vitals  /69 (BP Location: Right arm, Patient Position: Lying)   Pulse 97   Temp 36.9 °C (98.4 °F) (Oral)   Resp 18   Ht 5' 2" (1.575 m)   Wt 103 kg (227 lb)   LMP 2017   SpO2 99%   Breastfeeding? Yes   BMI 41.52 kg/m²       Pain/Valente Score: Pain Rating Prior to Med Admin: 6 (2018 11:35 AM)  Pain Rating Post Med Admin: 3 (2018 12:32 PM)    No catheter in back  No headache/neckache/backache  Full return of neurological function  Able to urinate  Advised patient to report any new problems of back pain, especially with fever or decreasing bladder function occurring during coming days to weeks      "

## 2018-02-27 NOTE — PHYSICIAN QUERY
"PT Name: Siobhan hCappell  MR #: [id number]     Physician Query Form - Documentation Clarification      CDS/: TED Lawson,RNC-MNN            Contact information:[email or web address]    This form is a permanent document in the medical record.     Query Date: 2018    By submitting this query, we are merely seeking further clarification of documentation. Please utilize your independent clinical judgment when addressing the question(s) below.    The Medical record reflects the following:    Supporting Clinical Findings Location in Medical Record   General:  Obesity     Prep diagnosis: Repeat low transverse  section    BMI=41.6  Ht=5' 2" (1.575 m )  Zn=574 kg (227 lb ) Anesthesia note       Op note       Anthropometrics                                                                                       Doctor, Please specify diagnosis or diagnoses associated with above clinical findings.    Provider Use Only        [  ] Morbid obesity complicating childbirth  [  ] Other, please specify:____________________________________________                                                                                                                   [  ] Clinically undetermined    This is Dr. Su's pt      "

## 2018-02-28 PROCEDURE — 11000001 HC ACUTE MED/SURG PRIVATE ROOM

## 2018-02-28 PROCEDURE — 25000003 PHARM REV CODE 250: Performed by: OBSTETRICS & GYNECOLOGY

## 2018-02-28 RX ADMIN — OXYCODONE HYDROCHLORIDE AND ACETAMINOPHEN 1 TABLET: 10; 325 TABLET ORAL at 09:02

## 2018-02-28 RX ADMIN — DOCUSATE SODIUM 200 MG: 100 CAPSULE, LIQUID FILLED ORAL at 09:02

## 2018-02-28 RX ADMIN — OXYCODONE HYDROCHLORIDE AND ACETAMINOPHEN 1 TABLET: 10; 325 TABLET ORAL at 08:02

## 2018-02-28 RX ADMIN — OXYCODONE HYDROCHLORIDE AND ACETAMINOPHEN 1 TABLET: 10; 325 TABLET ORAL at 04:02

## 2018-02-28 RX ADMIN — DOCUSATE SODIUM 200 MG: 100 CAPSULE, LIQUID FILLED ORAL at 08:02

## 2018-02-28 NOTE — LACTATION NOTE
" 02/28/18 7370   Maternal Information   Date of Referral 02/28/18   Person Making Referral patient   Maternal Medical Surgical History   History of Preexisting Medical Disorder yes   Medical Disorder asthma   Surgical History no   Infant Information   Infant's Name Regina   Infant Primary Childcare Provider Gurinder Garza   Maternal Infant Assessment   Breast Size Issue none  (large)   Breast Shape Bilateral:;pendulous   Breast Density Bilateral:;soft   Areola Bilateral:;elastic   Nipple(s) Bilateral:;everted;graspable   Nipple Symptoms left:;tender   Infant Assessment   Mouth Size average   Tongue/Frenulum Symptoms appearance normal;frenulum normal;intact   Frenulum normal   Sucking Reflex present   Rooting Reflex present   Swallow Reflex present   LATCH Score   Latch 2-->grasps breast, tongue down, lips flanged, rhythmic sucking   Audible Swallowing 1-->a few with stimulation   Type Of Nipple 2-->everted (after stimulation)   Comfort (Breast/Nipple) 1-->filling, red/small blisters/bruises, mild/mod discomfort   Hold (Positioning) 1-->minimal assist, teach one side: mother does other, staff holds   Score (less than 7 for 2/more consecutive times, consult Lactation Consultant) 7   Breasts WDL   Breasts WDL WDL   Pain/Comfort Assessments   Pain Assessment Performed Yes       Number Scale   Presence of Pain denies   Location - Side Bilateral   Location nipple(s)   Pain Frequency occasional   Pain Quality sharp   Factors that Aggravate Pain (latch on)   Factors that Relieve Pain repositioning   Pain Management Interventions position adjusted;pillow support provided   RASS ([place] Agitation-Sedation Scale)   RASS Patient Score 0-->alert and calm   Maternal Infant Feeding   Maternal Preparation breast care   Maternal Emotional State relaxed;assist needed   Infant Positioning clutch/"football"   Signs of Milk Transfer audible swallow;infant jaw motion present;suck/swallow ratio   Presence of Pain no   Comfort Measures " Before/During Feeding infant position adjusted;latch adjusted;maternal position adjusted;suction broken using finger   Time Spent (min) 30-60 min   Comfort Measures Following Feeding air-drying encouraged;expressed milk applied   Latch Assistance yes   Engorgement Measures warm shower encouraged;supportive bra encouraged;manual expression pre feeding   Breastfeeding Education adequate infant intake;adequate milk volume;importance of skin-to-skin contact;increasing milk supply;milk expression, hand   Breastfeeding History   Currently Breastfeeding no   Breastfeeding History no   Feeding Infant   Feeding Readiness Cues hand to mouth movements;finger sucking;quiet;rooting;sustained alertness   Satiety Cues decreased number of sucks   Feeding Tolerance/Success adequate pause for breath;arousal required;coordinated suck;coordinated swallow   Feeding Physical Stress Cues color unchanged;respirations unchanged   Effective Latch During Feeding yes   Audible Swallow yes   Suck/Swallow Coordination present   Skin-to-Skin Contact During Feeding yes   Lactation Referrals   Lactation Consult Breastfeeding assessment;Knowledge deficit   Lactation Interventions   Attachment Promotion breastfeeding assistance provided;family involvement promoted;privacy provided;rooming-in promoted;skin-to-skin contact encouraged   Breast Care: Breastfeeding milk massaged towards nipple;lanolin to nipple(s) applied   Breastfeeding Assistance assisted with positioning;assisted with techniques for flat/inverted nipples;both breasts offered each feeding;feeding cue recognition promoted;feeding on demand promoted;feeding session observed;infant latch-on verified;infant stimulated to wakeful state;infant suck/swallow verified;milk expression/pumping;support offered   Maternal Breastfeeding Support encouragement offered;maternal rest encouraged;maternal hydration promoted;infant-mother separation minimized   Latch Promotion positioning assisted;infant  moved to breast;suck stimulated with colostrum drop

## 2018-02-28 NOTE — PHYSICIAN QUERY
"PT Name: Siobhan Chappell  MR #: [id number]     Physician Query Form - Documentation Clarification      CDS/: TED Lawson,RNC-MNN            Contact information:[email or web address]    This form is a permanent document in the medical record.     Query Date: 2018    By submitting this query, we are merely seeking further clarification of documentation. Please utilize your independent clinical judgment when addressing the question(s) below.    The Medical record reflects the following:    Supporting Clinical Findings Location in Medical Record   General:  Obesity     Prep diagnosis: Repeat low transverse  section    BMI=41.6  Ht=5' 2" (1.575 m )  Uc=144 kg (227 lb ) Anesthesia note       Op note       Anthropometrics                                                                                       Doctor, Please specify diagnosis or diagnoses associated with above clinical findings.    Provider Use Only        [  ] Morbid obesity complicating childbirth  [ x ] Other, please specify:______pregnancy______________________________________                                                                                                                   [ x ] Clinically undetermined          "

## 2018-02-28 NOTE — PROGRESS NOTES
Postpartum day #2:  Patient continues to recover. VS stable.  Uterus firm; lochia minimal to moderate.  S/P repeat C/S through excessive scar tissue---pt informed of findings  Plan discharge tomorrow

## 2018-02-28 NOTE — PHYSICIAN QUERY
"PT Name: Siobhan Chappell  MR #: [id number]    Physician Query Form - Hematology Clarification      CDS/: TED Lawson,RNC-MNN           Contact information:[email or web address]    This form is a permanent document in the medical record.      Query Date: 2018    By submitting this query, we are merely seeking further clarification of documentation. Please utilize your independent clinical judgment when addressing the question(s) below.    The Medical record contains the following:   Indicators  Supporting Clinical Findings Location in Medical Record   X "Anemia" documented Anemia Anesthesia note    X H & H = Hgb=8.4-10.2  Hct=26.9-32.7 LAB -    BP =                     HR=      "GI bleeding" documented     X Acute bleeding (Non GI site) Estimated blood loss: 500 cc Op note     Transfusion(s)      Treatment:     X Other:  Prep diagnosis: Repeat low transverse  section Op note      Provider, please specify diagnosis or diagnoses associated with above clinical findings.    [  ] Acute blood loss anemia expected post-operatively  [x  ] Acute blood loss anemia  [  ] Other Hematological Diagnosis (please specify): _________________________________  [  ] Clinically Undetermined       Please document in your progress notes daily for the duration of treatment, until resolved, and include in your discharge summary.                                                                                                      "

## 2018-02-28 NOTE — PLAN OF CARE
Problem: Patient Care Overview  Goal: Plan of Care Review  Outcome: Ongoing (interventions implemented as appropriate)  Mother will breastfeed on cue at least eight or more times in 24 hours. Will avoid formula and artificial nipples. Will keep track of feedings and wet and dirty diapers. Will call with any breastfeeding needs.

## 2018-02-28 NOTE — LACTATION NOTE
" 02/28/18 3020   Maternal Information   Date of Referral 02/28/18   Person Making Referral patient   Maternal Medical Surgical History   History of Preexisting Medical Disorder yes   Medical Disorder asthma   Surgical History no   Infant Information   Infant's Name Regina   Infant Primary Childcare Provider Gurinder Garza   Maternal Infant Assessment   Breast Size Issue none  (large)   Breast Shape Bilateral:;pendulous   Breast Density Bilateral:;soft   Areola Bilateral:;elastic   Nipple(s) Bilateral:;everted;graspable   Nipple Symptoms left:;tender   Infant Assessment   Mouth Size average   Tongue/Frenulum Symptoms appearance normal;frenulum normal;intact   Frenulum normal   Sucking Reflex present   Rooting Reflex present   Swallow Reflex present   LATCH Score   Latch 2-->grasps breast, tongue down, lips flanged, rhythmic sucking   Audible Swallowing 1-->a few with stimulation   Type Of Nipple 2-->everted (after stimulation)   Comfort (Breast/Nipple) 1-->filling, red/small blisters/bruises, mild/mod discomfort   Hold (Positioning) 1-->minimal assist, teach one side: mother does other, staff holds   Score (less than 7 for 2/more consecutive times, consult Lactation Consultant) 7   Breasts WDL   Breasts WDL WDL   Pain/Comfort Assessments   Pain Assessment Performed Yes       Number Scale   Presence of Pain denies   Location - Side Bilateral   Location nipple(s)   Pain Frequency occasional   Pain Quality sharp   Factors that Aggravate Pain (latch on)   Factors that Relieve Pain repositioning   Pain Management Interventions position adjusted;pillow support provided   RASS ([place] Agitation-Sedation Scale)   RASS Patient Score 0-->alert and calm   Maternal Infant Feeding   Maternal Preparation breast care   Maternal Emotional State relaxed;assist needed   Infant Positioning clutch/"football"   Signs of Milk Transfer audible swallow;infant jaw motion present;suck/swallow ratio   Presence of Pain no   Comfort Measures " Before/During Feeding infant position adjusted;latch adjusted;maternal position adjusted;suction broken using finger   Time Spent (min) 30-60 min   Comfort Measures Following Feeding air-drying encouraged;expressed milk applied   Latch Assistance yes   Engorgement Measures warm shower encouraged;supportive bra encouraged;manual expression pre feeding   Breastfeeding Education adequate infant intake;adequate milk volume;importance of skin-to-skin contact;increasing milk supply;milk expression, hand   Breastfeeding History   Currently Breastfeeding no   Breastfeeding History no   Feeding Infant   Feeding Readiness Cues hand to mouth movements;finger sucking;quiet;rooting;sustained alertness   Satiety Cues decreased number of sucks   Feeding Tolerance/Success adequate pause for breath;arousal required;coordinated suck;coordinated swallow   Feeding Physical Stress Cues color unchanged;respirations unchanged   Effective Latch During Feeding yes   Audible Swallow yes   Suck/Swallow Coordination present   Skin-to-Skin Contact During Feeding yes   Lactation Referrals   Lactation Consult Breastfeeding assessment;Knowledge deficit   Lactation Interventions   Attachment Promotion breastfeeding assistance provided;family involvement promoted;privacy provided;rooming-in promoted;skin-to-skin contact encouraged   Breast Care: Breastfeeding milk massaged towards nipple;lanolin to nipple(s) applied   Breastfeeding Assistance assisted with positioning;assisted with techniques for flat/inverted nipples;both breasts offered each feeding;feeding cue recognition promoted;feeding on demand promoted;feeding session observed;infant latch-on verified;infant stimulated to wakeful state;infant suck/swallow verified;milk expression/pumping;support offered   Maternal Breastfeeding Support encouragement offered;maternal rest encouraged;maternal hydration promoted;infant-mother separation minimized   Latch Promotion positioning assisted;infant  moved to breast;suck stimulated with colostrum drop

## 2018-03-01 VITALS
SYSTOLIC BLOOD PRESSURE: 118 MMHG | RESPIRATION RATE: 18 BRPM | HEIGHT: 62 IN | HEART RATE: 75 BPM | OXYGEN SATURATION: 99 % | DIASTOLIC BLOOD PRESSURE: 70 MMHG | TEMPERATURE: 98 F | WEIGHT: 227 LBS | BODY MASS INDEX: 41.77 KG/M2

## 2018-03-01 PROCEDURE — 25000003 PHARM REV CODE 250: Performed by: OBSTETRICS & GYNECOLOGY

## 2018-03-01 RX ORDER — OXYCODONE AND ACETAMINOPHEN 10; 325 MG/1; MG/1
1 TABLET ORAL EVERY 6 HOURS PRN
Qty: 30 TABLET | Refills: 0 | Status: SHIPPED | OUTPATIENT
Start: 2018-03-01 | End: 2019-05-22

## 2018-03-01 RX ORDER — NAPROXEN 500 MG/1
500 TABLET ORAL EVERY 8 HOURS PRN
Qty: 30 TABLET | Refills: 2 | Status: SHIPPED | OUTPATIENT
Start: 2018-03-01 | End: 2019-05-22

## 2018-03-01 RX ADMIN — DOCUSATE SODIUM 200 MG: 100 CAPSULE, LIQUID FILLED ORAL at 08:03

## 2018-03-01 RX ADMIN — OXYCODONE HYDROCHLORIDE AND ACETAMINOPHEN 1 TABLET: 10; 325 TABLET ORAL at 12:03

## 2018-03-01 RX ADMIN — OXYCODONE HYDROCHLORIDE AND ACETAMINOPHEN 1 TABLET: 10; 325 TABLET ORAL at 08:03

## 2018-03-01 RX ADMIN — NAPROXEN 500 MG: 500 TABLET ORAL at 08:03

## 2018-03-01 RX ADMIN — NAPROXEN 500 MG: 500 TABLET ORAL at 12:03

## 2018-03-01 NOTE — DISCHARGE SUMMARY
Admit Date: 2018  Discharge Date: 2018    Attending physician: Hector Su MD    Diagnosis:  Term Pregnancy  Viable female  Infant      Procedure:     Repeat         Hospital Course: Afebrile and vital signs stable throughout hospital course. Routine progressive care. Vaginal bleeding stable. Tolerating oral intake. Positive flatus.    Discharged Condition: Improving    Disposition: Discharged to home or self care    Activity:  As tolerated  Pelvic rest x 6 weeks  Diet: Regular  Medications: Anaprox DS and Percocet   Follow up:  1  week

## 2018-03-01 NOTE — DISCHARGE INSTRUCTIONS
"Patient Discharge Instructions for Postpartum Women    Resume Regular Diet  Increase activity gradually, no heavy lifting  Shower  No tampons, douching or sexual intercourse.  Discuss birth control options with your physician.  Wear a support bra  Return to work/school when you've been cleared by a physician    Call your physician if     *Fever of 100.4 or higher  *Persistent nausea/ vomiting  *Incisional drainage  *Heavy vaginal bleeding or large clots (Heavy bleeding is soaking 1 pad in an hour)  *Swelling and pain in arms or legs  *Severe headaches, blurred vision or fainting  *Shortness of breath  *Frequency and burning with urination  *Signs of postpartum depression, discuss these signs with your physician    Call lactation services for questions regarding feeding, nipple and breast care, and general questions about lactation.  They can be reached at [phone]         Understanding Postpartum Depression    You've just had a baby.  You know you should be excited and happy.  But instead you find yourself crying for no reason.  You may have trouble coping with your daily tasks.  You feel sad, tired, and hopeless most of the time.  You may even feel ashamed or guilty.  But what you're going through is not your fault and you can feel better.  Talk to your doctor.  He or she can help.    Depression After Childbirth    You may be weepy and tired right after giving birth.  These feelings are normal.  They're sometimes called the "baby blues."  These blues go away 2-3 weeks.  However, postpartum (meaning "after birth") depression lasts much longer and is more sever than the "baby blues."  It can make you feel sad and hopeless.  You may also fear that your baby will be harmed and worry about being a bad mother.      What is Depression?    Depression is a mood disorder that affects the way you think and feel.  The most common symptom is a feeling of deep sadness.  You may also feel as if you just can't cope with life.  "   Other symptoms include:      * Gaining or loosing weight  * Sleeping too much or too little  * Feeling tired all the time  * Feeling restless  * Fears of harming your baby   * Lack of interest in your baby  * Feeling worthless or guilty  * No longer finding pleasure in things you used to  * Having trouble thinking clearly or making decisions  * Thoughts of hurting yourself or your baby    What Causes Postpartum Depression    The exact causes of postpartum depression isn't known.  It may be due to changes in your hormones during and after childbirth.  You may also be tired from caring for your baby and adjusting to being a mother.  All these factors may make you feel depressed.  In some cases, your genes may also play a role.    Depression Can Be Treated    The good news is that there are many ways to treat postpartum depression.  Talking to your doctor is the first step toward feeling better.    Resources:    * [employer] of Mental Health  -- 171.957.2714    www.nimh.nih.gov    * National [place] on Mental Illness --151.533.6401    Www.librado.org    * Mental Health [place] -- 140.250.3109     Www.[facility].org    * National Suicide Hotline --390.627.1246 (800-SUICIDE)    5668-5625 [employer]  All rights reserved.  This information is not intended as a substitute for professional medical care.  Always follow up with your healthcare professional's instructions.      Breastfeeding Discharge Instructions       Feed the baby at the earliest sign of hunger or comfort  o Hands to mouth, sucking motions  o Rooting or searching for something to suck on  o Dont wait for crying - it is a sign of distress     The feedings may be 8-12 times per 24hrs and will not follow a schedule   Avoid pacifiers and bottles for the first 4 weeks   Alternate the breast you start the feeding with, or start with the breast that feels the fullest   Switch breasts when the baby takes himself off the breast or falls  asleep   Keep offering breasts until the baby looks full, no longer gives hunger signs, and stays asleep when placed on his back in the crib   If the baby is sleepy and wont wake for a feeding, put the baby skin-to-skin dressed in a diaper against the mothers bare chest   Sleep near your baby   The baby should be positioned and latched on to the breast correctly  o Chest-to-chest, chin in the breast  o Babys lips are flipped outward  o Babys mouth is stretched open wide like a shout  o Babys sucking should feel like tugging to the mother  - The baby should be drinking at the breast:  o You should hear swallowing or gulping throughout the feeding  o You should see milk on the babys lips when he comes off the breast  o Your breasts should be softer when the baby is finished feeding  o The baby should look relaxed at the end of feedings  o After the 4th day and your milk is in:  o The babys poop should turn bright yellow and be loose, watery, and seedy  o The baby should have at least 3-4 poops the size of the palm of your hand per day  o The baby should have at least 5-6 wet diapers per day  o The urine should be light yellow in color  You should drink when you are thirsty and eat a healthy diet when you are    hungry.     Take naps to get the rest you need.   Take medications and/or drink alcohol only with permission of your obstetrician    or the babys pediatrician.  You can also call the Infant Risk Center,   ([phone]), Monday-Friday, 8am-5pm Central time, to get the most   up-to-date evidence-based information on the use of medications during   pregnancy and breastfeeding.      The baby should be examined by a pediatrician at 3-5 days of age.  Once your   milk comes in, the baby should be gaining at least ½ - 1oz each day and should be back to birthweight no later than 10-14 days of age.          Community Resources    [facility] Breastfeeding Warmline: 546.147.4151  [facility]  clinics: provide incentives and breastpumps to eligible mothers  [employer] (LLLI):  mother-to-mother support group website        www.lll.org  [facility] La Leche Lejoyce mother-to-mother support groups:        www.[employer]        [facility] [employer] [facility]   Dr. Erik Valerio website for latch videos and general information:        www.breastfeedinginc.ca  Infant Risk Center is a call center that provides information about the safety of taking medications while breastfeeding.  Call 1-875.420.3008, M-F, 8am-5pm, [place].  International Lactation Consultant Association provides resources for assistance:        www.ilca.org  [facility] Breastfeeding Coalition provides informationand resources for parents  and the community    http://[facility]astfeeding.org     Loni Mac is a mom-to-mom support group:                             www.[employer]//breastfeedng-support/  Partners for Healthy Babies:  6-852-240-BABY(9227)  [place]: a breastfeeding support group for women of color, 751.158.9548

## 2018-03-01 NOTE — PROGRESS NOTES
Postpartum day #3:  Patient continues to recover. VS stable.  Uterus firm; lochia minimal to moderate.  Incision looks good under aqua cell bandage  Ready for discharge home.  Instructions and precautions given.  Return to office in 1 week.

## 2018-03-01 NOTE — PLAN OF CARE
Problem: Patient Care Overview  Goal: Plan of Care Review  Outcome: Outcome(s) achieved Date Met: 03/01/18  Mom will breastfeed frequently & on cue at least 8+ times/24 hrs.  Will monitor for signs of adequate fdg. Will avoid giving formula if BR well. Discussed benefits of BR/risks of formula fdg. Breastfeeding encouraged.  Will have baby's weight checked at ped's office in the next couple of days.  Will call for any needs.

## 2018-03-01 NOTE — PROGRESS NOTES
Discharge instructions given verbally and in writing.  Verbalized understanding.  Received Mother-Baby care guide during hospital stay.  Awaiting prescriptions from pharmacy. Verbalized understanding.  CDC vaccine information statement given and risk/benifits of vaccine discussed.  Tdap not given per pt. request.  States she feels comfortable taking care of baby and has demonstrated ability to care for  and herself.  Says she will have assistance when she returns home.

## 2018-03-01 NOTE — LACTATION NOTE
03/01/18 1005   Maternal Infant Assessment   Breast Density Bilateral:;soft;filling   Areola Bilateral:;elastic   Nipple(s) Bilateral:;graspable;everted  (w/ stimulation)   Nipple Symptoms bilateral:;bruised;tender;other (see comments)  (compression stripes;enc deeper latch)   Breasts WDL   Breasts WDL ex  (filling per mom)   Pain/Comfort Assessments   Pain Assessment Performed Yes       Number Scale   Presence of Pain complains of pain/discomfort   Location - Side Bilateral   Location nipple(s)   Pain Rating: Activity 6  (decreases to 4 after few mins of sucking)   Factors that Aggravate Pain other (see comments)  (BR)   Factors that Relieve Pain other (see comments)  (colostrum; lanolin)   Maternal Infant Feeding   Maternal Preparation breast care;hand hygiene   Maternal Emotional State relaxed   Presence of Pain yes   Pain Location nipples, bilateral   Pain Description soreness   Breast Milk Supply Volume (ml) (expresses colostrum easily)   Time Spent (min) 15-30 min   Comfort Measures Following Feeding expressed milk applied   Engorgement Measures complete emptying encouraged;manual expression pre feeding;supportive bra encouraged   Breastfeeding Education adequate infant intake;adequate milk volume;diet;importance of skin-to-skin contact;increasing milk supply;medication effects;milk expression, hand;prenatal vitamins continued;returning to work   Lactation Referrals   Lactation Consult Breast/nipple pain;Follow up;Knowledge deficit   Lactation Referrals pediatric care provider   Lactation Follow-up Date/Time (Pediatric Care Provider) within 2-3 days   Lactation Interventions   Attachment Promotion counseling provided;face-to-face positioning promoted;privacy provided;skin-to-skin contact encouraged   Breast Care: Breastfeeding milk massaged towards nipple   Breastfeeding Assistance assisted with positioning;assisted with techniques for flat/inverted nipples;feeding cue recognition promoted;feeding on demand  promoted;infant stimulated to wakeful state;milk expression/pumping;support offered;other (see comments)  (baby sleepy; not interested in BR now;just had formula)   Maternal Breastfeeding Support encouragement offered;lactation counseling provided;maternal hydration promoted;maternal nutrition promoted;maternal rest encouraged   Latch Promotion positioning assisted;suck stimulated with colostrum drop  (not interested;just fed formula)

## 2018-03-07 ENCOUNTER — TELEPHONE (OUTPATIENT)
Dept: OBSTETRICS AND GYNECOLOGY | Facility: CLINIC | Age: 28
End: 2018-03-07

## 2018-03-07 NOTE — TELEPHONE ENCOUNTER
----- Message from Aviva Pinto sent at 3/6/2018  2:09 PM CST -----  Contact: [phone]/self  Patient requesting to speak with you regarding if the office's have receive her [employer] paperwork. Please advise.

## 2018-03-07 NOTE — TELEPHONE ENCOUNTER
----- Message from Simi Stweart sent at 3/7/2018  8:54 AM CST -----  No. [phone]    Patient returned your call.

## 2018-03-07 NOTE — TELEPHONE ENCOUNTER
Patient notified that we did receive [facility] paperwork. Will fax paperwork to insurance today. All questions answered and patient verbalized understanding.

## 2018-03-12 ENCOUNTER — OFFICE VISIT (OUTPATIENT)
Dept: OBSTETRICS AND GYNECOLOGY | Facility: CLINIC | Age: 28
End: 2018-03-12
Payer: COMMERCIAL

## 2018-03-12 VITALS
SYSTOLIC BLOOD PRESSURE: 120 MMHG | DIASTOLIC BLOOD PRESSURE: 80 MMHG | BODY MASS INDEX: 38.06 KG/M2 | WEIGHT: 206.81 LBS | HEIGHT: 62 IN

## 2018-03-12 DIAGNOSIS — Z30.9 ENCOUNTER FOR CONTRACEPTIVE MANAGEMENT, UNSPECIFIED TYPE: ICD-10-CM

## 2018-03-12 DIAGNOSIS — Z98.890 POST-OPERATIVE STATE: Primary | ICD-10-CM

## 2018-03-12 PROCEDURE — 99999 PR PBB SHADOW E&M-EST. PATIENT-LVL III: CPT | Mod: PBBFAC,,, | Performed by: OBSTETRICS & GYNECOLOGY

## 2018-03-12 PROCEDURE — 0503F POSTPARTUM CARE VISIT: CPT | Mod: S$GLB,,, | Performed by: OBSTETRICS & GYNECOLOGY

## 2018-03-12 RX ORDER — ACETAMINOPHEN AND CODEINE PHOSPHATE 120; 12 MG/5ML; MG/5ML
1 SOLUTION ORAL DAILY
Qty: 30 TABLET | Refills: 11 | Status: SHIPPED | OUTPATIENT
Start: 2018-03-12 | End: 2019-03-28

## 2018-03-12 NOTE — PROGRESS NOTES
Patient return today for postp/potpartumisi.  Te aa cell and sved ih holv her Therei onespotnthe rigto thdof  icis axe c lwhere t eat approximae wel. Patien is cnsled athis toea oe the futu inelrnitrat o     Treatment ar.   Patient sarted o Nr-Qd for bith onro  Hoevr se I pecao t aoitenuous iy o sxuacit n3-s.   Paiet will retu i3 weeks for woudck and secod pstartum viit

## 2018-03-23 ENCOUNTER — TELEPHONE (OUTPATIENT)
Dept: OBSTETRICS AND GYNECOLOGY | Facility: CLINIC | Age: 28
End: 2018-03-23

## 2018-03-23 ENCOUNTER — POSTPARTUM VISIT (OUTPATIENT)
Dept: OBSTETRICS AND GYNECOLOGY | Facility: CLINIC | Age: 28
End: 2018-03-23
Payer: COMMERCIAL

## 2018-03-23 VITALS
HEIGHT: 62 IN | SYSTOLIC BLOOD PRESSURE: 110 MMHG | DIASTOLIC BLOOD PRESSURE: 80 MMHG | BODY MASS INDEX: 37.29 KG/M2 | WEIGHT: 202.63 LBS

## 2018-03-23 DIAGNOSIS — Z51.89 ENCOUNTER FOR WOUND RE-CHECK: Primary | ICD-10-CM

## 2018-03-23 PROCEDURE — 0503F POSTPARTUM CARE VISIT: CPT | Mod: S$GLB,,, | Performed by: OBSTETRICS & GYNECOLOGY

## 2018-03-23 PROCEDURE — 99999 PR PBB SHADOW E&M-EST. PATIENT-LVL III: CPT | Mod: PBBFAC,,, | Performed by: OBSTETRICS & GYNECOLOGY

## 2018-03-23 RX ORDER — MUPIROCIN 20 MG/G
OINTMENT TOPICAL 2 TIMES DAILY
Qty: 15 G | Refills: 1 | Status: SHIPPED | OUTPATIENT
Start: 2018-03-23 | End: 2019-05-22

## 2018-03-23 RX ORDER — LIDOCAINE AND PRILOCAINE 25; 25 MG/G; MG/G
CREAM TOPICAL
Qty: 30 G | Refills: 1 | Status: SHIPPED | OUTPATIENT
Start: 2018-03-23 | End: 2019-05-22

## 2018-03-23 NOTE — PROGRESS NOTES
Patient presents to the office today with concerns about her incision.  It looks essentially the same as it did when the bandage was uncovered week ago.  There is a small 1 cm spot in the right one third that is uneven and subcutaneous reddened subdermal tissue was exposed.  This area sensitive and has put a small amount of drainage on a 4 x 4.  She states that there has not been any significant drainage of what she seen on the 4 x 4.  The area was cleaned without urgent peroxide and the patient was instructed on how to do this.  She'll be given Bactroban ointment and lidocaine-Prilocaine cream for use as needed for stinging or discomfort.  She is instructed to care for the spot on the incision at least twice daily.  The area was checked with a sterile cotton swab but there is no pocket or collection of subcutaneous fluid.  Patient will return for her next visit or call if the condition of her wound worsens.

## 2018-03-23 NOTE — TELEPHONE ENCOUNTER
----- Message from Lilly Hoyos sent at 3/23/2018  9:24 AM CDT -----  Contact: Self/ [phone]  Patient called in since she had a  and now she may have an infection.     Please call and advise.

## 2018-04-02 ENCOUNTER — OFFICE VISIT (OUTPATIENT)
Dept: OBSTETRICS AND GYNECOLOGY | Facility: CLINIC | Age: 28
End: 2018-04-02
Payer: COMMERCIAL

## 2018-04-02 VITALS
WEIGHT: 204.13 LBS | SYSTOLIC BLOOD PRESSURE: 120 MMHG | DIASTOLIC BLOOD PRESSURE: 60 MMHG | BODY MASS INDEX: 37.56 KG/M2 | HEIGHT: 62 IN

## 2018-04-02 DIAGNOSIS — Z51.89 VISIT FOR WOUND CHECK: Primary | ICD-10-CM

## 2018-04-02 PROCEDURE — 99024 POSTOP FOLLOW-UP VISIT: CPT | Mod: S$GLB,,, | Performed by: OBSTETRICS & GYNECOLOGY

## 2018-04-02 PROCEDURE — 99999 PR PBB SHADOW E&M-EST. PATIENT-LVL III: CPT | Mod: PBBFAC,,, | Performed by: OBSTETRICS & GYNECOLOGY

## 2018-04-02 NOTE — PROGRESS NOTES
The area of concern in the incision appears to be healing in well after silver nitrate superficial cauterization.  Patient has started her birth control pills.  She is given full return to activities with precautions to protect the incisional area.  Note is also given for return to work effective April 23, 2018.  Patient will return to the office in 3 months for Pap smear and annual checkup.

## 2018-04-19 ENCOUNTER — TELEPHONE (OUTPATIENT)
Dept: OBSTETRICS AND GYNECOLOGY | Facility: CLINIC | Age: 28
End: 2018-04-19

## 2018-04-19 DIAGNOSIS — Z30.9 ENCOUNTER FOR CONTRACEPTIVE MANAGEMENT, UNSPECIFIED TYPE: Primary | ICD-10-CM

## 2018-04-19 RX ORDER — NORGESTIMATE AND ETHINYL ESTRADIOL 7DAYSX3 28
1 KIT ORAL DAILY
Qty: 28 TABLET | Refills: 11 | Status: SHIPPED | OUTPATIENT
Start: 2018-04-19 | End: 2019-04-19

## 2018-04-19 NOTE — TELEPHONE ENCOUNTER
----- Message from Johana Curry sent at 4/19/2018 10:16 AM CDT -----  Contact: [phone]/ self   Pt called stating she is having side ejects to her birth control . Pt states she she having headaches, painful and swollen eyes . Please advise

## 2018-04-19 NOTE — TELEPHONE ENCOUNTER
Patient states that she has noticed some pain and swelling in her eyes since taking the Micronor ocp. Does not report any problems with sinuses. Patient states that she is not breast feeding anymore and would like to try a different pill.    Please advise.

## 2018-08-21 ENCOUNTER — TELEPHONE (OUTPATIENT)
Dept: OBSTETRICS AND GYNECOLOGY | Facility: CLINIC | Age: 28
End: 2018-08-21

## 2018-08-21 NOTE — TELEPHONE ENCOUNTER
Called and patient wanted to know what her copay is but she has new insurance so I can not tell her.

## 2018-08-21 NOTE — TELEPHONE ENCOUNTER
----- Message from Shandra Eckert sent at 8/21/2018  3:37 PM CDT -----  Contact: [phone]/self  Patient requesting to speak with you regarding her visit and copay.

## 2018-08-22 ENCOUNTER — OFFICE VISIT (OUTPATIENT)
Dept: OBSTETRICS AND GYNECOLOGY | Facility: CLINIC | Age: 28
End: 2018-08-22
Payer: COMMERCIAL

## 2018-08-22 VITALS
BODY MASS INDEX: 38.01 KG/M2 | HEIGHT: 62 IN | DIASTOLIC BLOOD PRESSURE: 76 MMHG | SYSTOLIC BLOOD PRESSURE: 118 MMHG | WEIGHT: 206.56 LBS

## 2018-08-22 DIAGNOSIS — N89.8 VAGINAL DISCHARGE: ICD-10-CM

## 2018-08-22 DIAGNOSIS — Z30.9 ENCOUNTER FOR CONTRACEPTIVE MANAGEMENT, UNSPECIFIED TYPE: ICD-10-CM

## 2018-08-22 DIAGNOSIS — Z01.419 WELL WOMAN EXAM WITH ROUTINE GYNECOLOGICAL EXAM: Primary | ICD-10-CM

## 2018-08-22 PROCEDURE — 99999 PR PBB SHADOW E&M-EST. PATIENT-LVL IV: CPT | Mod: PBBFAC,,, | Performed by: OBSTETRICS & GYNECOLOGY

## 2018-08-22 PROCEDURE — 87480 CANDIDA DNA DIR PROBE: CPT

## 2018-08-22 PROCEDURE — 99395 PREV VISIT EST AGE 18-39: CPT | Mod: S$GLB,,, | Performed by: OBSTETRICS & GYNECOLOGY

## 2018-08-22 PROCEDURE — 88175 CYTOPATH C/V AUTO FLUID REDO: CPT

## 2018-08-22 PROCEDURE — 87510 GARDNER VAG DNA DIR PROBE: CPT

## 2018-08-22 RX ORDER — NORGESTIMATE AND ETHINYL ESTRADIOL 7DAYSX3 28
1 KIT ORAL DAILY
Qty: 28 TABLET | Refills: 11 | Status: SHIPPED | OUTPATIENT
Start: 2018-08-22 | End: 2019-05-22 | Stop reason: SDUPTHER

## 2018-08-22 RX ORDER — FLUCONAZOLE 150 MG/1
150 TABLET ORAL ONCE
Qty: 1 TABLET | Refills: 0 | Status: SHIPPED | OUTPATIENT
Start: 2018-08-22 | End: 2018-08-22

## 2018-08-22 RX ORDER — METRONIDAZOLE 500 MG/1
TABLET ORAL
Qty: 8 TABLET | Refills: 0 | Status: SHIPPED | OUTPATIENT
Start: 2018-08-22 | End: 2019-05-22

## 2018-08-22 NOTE — PROGRESS NOTES
Subjective:       Patient ID: Siobhan Chappell is a 28 y.o. female.    Chief Complaint: Well Woman (annual and having a watery discharge and itching and irratated)    Baby weaned---ready to go to regular ocp  Vaginal irritating watery discharge---affirm done    HPI  Review of Systems   Gastrointestinal: Negative for abdominal distention, abdominal pain, constipation and nausea.   Genitourinary: Negative for dyspareunia, dysuria, genital sores, pelvic pain, vaginal bleeding and vaginal discharge.       Objective:      Physical Exam   Constitutional: She appears well-developed and well-nourished.   Pulmonary/Chest: Right breast exhibits no mass, no nipple discharge, no skin change and no tenderness. Left breast exhibits no mass, no nipple discharge, no skin change and no tenderness.   Abdominal: Soft. Bowel sounds are normal. She exhibits no distension and no mass. There is no tenderness. There is no rebound and no guarding. Hernia confirmed negative in the right inguinal area and confirmed negative in the left inguinal area.   Genitourinary: Rectum normal, vagina normal and uterus normal. No breast tenderness or discharge. There is no lesion on the right labia. There is no lesion on the left labia. Uterus is not fixed and not tender. Cervix exhibits no motion tenderness, no discharge and no friability. Right adnexum displays no mass, no tenderness and no fullness. Left adnexum displays no mass, no tenderness and no fullness. No tenderness in the vagina. No vaginal discharge found.   Lymphadenopathy:        Right: No inguinal adenopathy present.        Left: No inguinal adenopathy present.       Assessment:       1. Well woman exam with routine gynecological exam    2. Vaginal discharge    3. Encounter for contraceptive management, unspecified type        Plan:     annual gyn visit; pap and ocp's; affirm; will treat now; results pending

## 2018-08-23 LAB
CANDIDA RRNA VAG QL PROBE: NEGATIVE
G VAGINALIS RRNA GENITAL QL PROBE: NEGATIVE
T VAGINALIS RRNA GENITAL QL PROBE: POSITIVE

## 2018-08-24 ENCOUNTER — TELEPHONE (OUTPATIENT)
Dept: OBSTETRICS AND GYNECOLOGY | Facility: HOSPITAL | Age: 28
End: 2018-08-24

## 2018-08-24 ENCOUNTER — TELEPHONE (OUTPATIENT)
Dept: OBSTETRICS AND GYNECOLOGY | Facility: CLINIC | Age: 28
End: 2018-08-24

## 2018-08-24 NOTE — TELEPHONE ENCOUNTER
----- Message from Jose Juan Haywood sent at 8/24/2018  8:50 AM CDT -----  Contact: [phone]  Patient called in returning your call. Please call.

## 2018-08-24 NOTE — TELEPHONE ENCOUNTER
----- Message from Hayley Crespo sent at 8/24/2018 12:59 PM CDT -----  Contact: self, [phone]  Patient requests to speak with you about medication prescribed and instructions. States she is at work and does not remember instructions and also understood she was getting 2 medications but her pharmacy states she only has one. Please advise.

## 2018-08-24 NOTE — TELEPHONE ENCOUNTER
Patient notified that she was given rx for Flagyl at last office visit. Therefore there was only 1 rx that needed to be picked up from the pharmacy today. Also instructed patient on directions on how to take medication. All questions answered and patient verbalized understanding.

## 2018-08-24 NOTE — TELEPHONE ENCOUNTER
Inform pt test showed trichmonas.  Call in Tindamax 500mg; Disp #4; take all at once. Refill X 1.   May already have rx for flagyl---inform pt and instruct to take 4 tabs at once, can give other 4 to partner

## 2018-08-29 ENCOUNTER — TELEPHONE (OUTPATIENT)
Dept: OBSTETRICS AND GYNECOLOGY | Facility: CLINIC | Age: 28
End: 2018-08-29

## 2018-08-30 NOTE — TELEPHONE ENCOUNTER
Inform pt pap was negative.    Inform pt test showed trichmonas.  Call in Tindamax 500mg; Disp #4; take all at once. Refill X 1.    MAY ALREADY HAVE BEEN TREATED

## 2019-02-04 PROBLEM — Z3A.39 39 WEEKS GESTATION OF PREGNANCY: Status: RESOLVED | Noted: 2018-02-26 | Resolved: 2019-02-04

## 2019-02-13 ENCOUNTER — TELEPHONE (OUTPATIENT)
Dept: OBSTETRICS AND GYNECOLOGY | Facility: CLINIC | Age: 29
End: 2019-02-13

## 2019-02-13 NOTE — TELEPHONE ENCOUNTER
----- Message from Aviva Pinto sent at 2/13/2019  8:11 AM CST -----  Contact: [phone]/self  Patient requesting to speak with you regarding getting for a  medication bacterial infection. Please advise.

## 2019-03-06 ENCOUNTER — TELEPHONE (OUTPATIENT)
Dept: OBSTETRICS AND GYNECOLOGY | Facility: CLINIC | Age: 29
End: 2019-03-06

## 2019-03-28 ENCOUNTER — LAB VISIT (OUTPATIENT)
Dept: LAB | Facility: HOSPITAL | Age: 29
End: 2019-03-28
Attending: OBSTETRICS & GYNECOLOGY
Payer: COMMERCIAL

## 2019-03-28 ENCOUNTER — OFFICE VISIT (OUTPATIENT)
Dept: OBSTETRICS AND GYNECOLOGY | Facility: CLINIC | Age: 29
End: 2019-03-28
Payer: COMMERCIAL

## 2019-03-28 VITALS
DIASTOLIC BLOOD PRESSURE: 70 MMHG | SYSTOLIC BLOOD PRESSURE: 110 MMHG | BODY MASS INDEX: 38.91 KG/M2 | WEIGHT: 211.44 LBS | HEIGHT: 62 IN

## 2019-03-28 DIAGNOSIS — Z01.419 WELL WOMAN EXAM WITH ROUTINE GYNECOLOGICAL EXAM: Primary | ICD-10-CM

## 2019-03-28 DIAGNOSIS — Z71.1 CONCERN ABOUT STD IN FEMALE WITHOUT DIAGNOSIS: ICD-10-CM

## 2019-03-28 DIAGNOSIS — Z30.9 ENCOUNTER FOR CONTRACEPTIVE MANAGEMENT, UNSPECIFIED TYPE: ICD-10-CM

## 2019-03-28 PROCEDURE — 87480 CANDIDA DNA DIR PROBE: CPT

## 2019-03-28 PROCEDURE — 99999 PR PBB SHADOW E&M-EST. PATIENT-LVL III: CPT | Mod: PBBFAC,,, | Performed by: OBSTETRICS & GYNECOLOGY

## 2019-03-28 PROCEDURE — 99999 PR PBB SHADOW E&M-EST. PATIENT-LVL III: ICD-10-PCS | Mod: PBBFAC,,, | Performed by: OBSTETRICS & GYNECOLOGY

## 2019-03-28 PROCEDURE — 87491 CHLMYD TRACH DNA AMP PROBE: CPT

## 2019-03-28 PROCEDURE — 86592 SYPHILIS TEST NON-TREP QUAL: CPT

## 2019-03-28 PROCEDURE — 88175 CYTOPATH C/V AUTO FLUID REDO: CPT | Performed by: PATHOLOGY

## 2019-03-28 PROCEDURE — 99395 PREV VISIT EST AGE 18-39: CPT | Mod: S$GLB,,, | Performed by: OBSTETRICS & GYNECOLOGY

## 2019-03-28 PROCEDURE — 86703 HIV-1/HIV-2 1 RESULT ANTBDY: CPT

## 2019-03-28 PROCEDURE — 99395 PR PREVENTIVE VISIT,EST,18-39: ICD-10-PCS | Mod: S$GLB,,, | Performed by: OBSTETRICS & GYNECOLOGY

## 2019-03-28 PROCEDURE — 88141 LIQUID-BASED PAP SMEAR, SCREENING: ICD-10-PCS | Mod: ,,, | Performed by: PATHOLOGY

## 2019-03-28 PROCEDURE — 36415 COLL VENOUS BLD VENIPUNCTURE: CPT

## 2019-03-28 PROCEDURE — 87510 GARDNER VAG DNA DIR PROBE: CPT

## 2019-03-28 PROCEDURE — 88141 CYTOPATH C/V INTERPRET: CPT | Mod: ,,, | Performed by: PATHOLOGY

## 2019-03-28 NOTE — PROGRESS NOTES
Subjective:       Patient ID: Siobhan Chappell is a 29 y.o. female.    Chief Complaint: Well Woman (annual)    Stopped pill---options discussed---will go with NEXPLANON---info given.  Requests STD screening    HPI  Review of Systems   Gastrointestinal: Negative for abdominal distention, abdominal pain, constipation and nausea.   Genitourinary: Negative for dyspareunia, dysuria, genital sores, pelvic pain, vaginal bleeding and vaginal discharge.       Objective:      Physical Exam   Constitutional: She appears well-developed and well-nourished.   Pulmonary/Chest: Right breast exhibits no mass, no nipple discharge, no skin change and no tenderness. Left breast exhibits no mass, no nipple discharge, no skin change and no tenderness. No breast tenderness or discharge.   Abdominal: Soft. Bowel sounds are normal. She exhibits no distension and no mass. There is no tenderness. There is no rebound and no guarding. Hernia confirmed negative in the right inguinal area and confirmed negative in the left inguinal area.   Genitourinary: Rectum normal, vagina normal and uterus normal. No breast tenderness or discharge. There is no lesion on the right labia. There is no lesion on the left labia. Uterus is not fixed and not tender. Cervix exhibits no motion tenderness, no discharge and no friability. Right adnexum displays no mass, no tenderness and no fullness. Left adnexum displays no mass, no tenderness and no fullness. No tenderness in the vagina. No vaginal discharge found.   Lymphadenopathy:        Right: No inguinal adenopathy present.        Left: No inguinal adenopathy present.       Assessment:       1. Well woman exam with routine gynecological exam    2. Encounter for contraceptive management, unspecified type        Plan:         annual gyn visit; pap and STD panel; order Nexplanon

## 2019-03-29 LAB
BACTERIAL VAGINOSIS DNA: NEGATIVE
CANDIDA GLABRATA DNA: NEGATIVE
CANDIDA KRUSEI DNA: NEGATIVE
CANDIDA RRNA VAG QL PROBE: NEGATIVE
HIV 1+2 AB+HIV1 P24 AG SERPL QL IA: NEGATIVE
RPR SER QL: NORMAL
T VAGINALIS RRNA GENITAL QL PROBE: NEGATIVE

## 2019-04-01 LAB
C TRACH DNA SPEC QL NAA+PROBE: NOT DETECTED
N GONORRHOEA DNA SPEC QL NAA+PROBE: NOT DETECTED

## 2019-04-02 ENCOUNTER — TELEPHONE (OUTPATIENT)
Dept: OBSTETRICS AND GYNECOLOGY | Facility: CLINIC | Age: 29
End: 2019-04-02

## 2019-04-02 NOTE — TELEPHONE ENCOUNTER
----- Message from Hayley Crespo sent at 4/2/2019 12:50 PM CDT -----  Contact: self, [phone] (M)  Patient requests results from lab test done on 3/28. Please advise.

## 2019-04-16 ENCOUNTER — TELEPHONE (OUTPATIENT)
Dept: OBSTETRICS AND GYNECOLOGY | Facility: CLINIC | Age: 29
End: 2019-04-16

## 2019-04-17 NOTE — TELEPHONE ENCOUNTER
Patient notified of pap smear results. Scheduled for colpo on 05/22 @ 0830. All questions answered and patient verbalized understanding.

## 2019-05-22 ENCOUNTER — OFFICE VISIT (OUTPATIENT)
Dept: OBSTETRICS AND GYNECOLOGY | Facility: CLINIC | Age: 29
End: 2019-05-22
Payer: COMMERCIAL

## 2019-05-22 VITALS
SYSTOLIC BLOOD PRESSURE: 120 MMHG | HEIGHT: 62 IN | DIASTOLIC BLOOD PRESSURE: 66 MMHG | WEIGHT: 201.75 LBS | BODY MASS INDEX: 37.13 KG/M2

## 2019-05-22 DIAGNOSIS — R87.612 LGSIL ON PAP SMEAR OF CERVIX: Primary | ICD-10-CM

## 2019-05-22 PROCEDURE — 99999 PR PBB SHADOW E&M-EST. PATIENT-LVL III: CPT | Mod: PBBFAC,,, | Performed by: OBSTETRICS & GYNECOLOGY

## 2019-05-22 PROCEDURE — 88305 TISSUE EXAM BY PATHOLOGIST: CPT | Mod: 26,,, | Performed by: PATHOLOGY

## 2019-05-22 PROCEDURE — 88141 CYTOPATH C/V INTERPRET: CPT | Mod: ,,, | Performed by: PATHOLOGY

## 2019-05-22 PROCEDURE — 88141 LIQUID-BASED PAP SMEAR, SCREENING: ICD-10-PCS | Mod: ,,, | Performed by: PATHOLOGY

## 2019-05-22 PROCEDURE — 57454 BX/CURETT OF CERVIX W/SCOPE: CPT | Mod: S$GLB,,, | Performed by: OBSTETRICS & GYNECOLOGY

## 2019-05-22 PROCEDURE — 88305 TISSUE SPECIMEN TO PATHOLOGY, OBSTETRICS/GYNECOLOGY: ICD-10-PCS | Mod: 26,,, | Performed by: PATHOLOGY

## 2019-05-22 PROCEDURE — 99499 UNLISTED E&M SERVICE: CPT | Mod: S$GLB,,, | Performed by: OBSTETRICS & GYNECOLOGY

## 2019-05-22 PROCEDURE — 99499 NO LOS: ICD-10-PCS | Mod: S$GLB,,, | Performed by: OBSTETRICS & GYNECOLOGY

## 2019-05-22 PROCEDURE — 88175 CYTOPATH C/V AUTO FLUID REDO: CPT | Performed by: PATHOLOGY

## 2019-05-22 PROCEDURE — 99999 PR PBB SHADOW E&M-EST. PATIENT-LVL III: ICD-10-PCS | Mod: PBBFAC,,, | Performed by: OBSTETRICS & GYNECOLOGY

## 2019-05-22 PROCEDURE — 57454 PR COLPOSC,CERVIX W/ADJ VAG,W/BX & CURRETAG: ICD-10-PCS | Mod: S$GLB,,, | Performed by: OBSTETRICS & GYNECOLOGY

## 2019-05-22 PROCEDURE — 88305 TISSUE EXAM BY PATHOLOGIST: CPT | Performed by: PATHOLOGY

## 2019-05-22 RX ORDER — NORGESTIMATE AND ETHINYL ESTRADIOL 7DAYSX3 28
1 KIT ORAL DAILY
Qty: 28 TABLET | Refills: 11 | Status: SHIPPED | OUTPATIENT
Start: 2019-05-22 | End: 2019-12-07 | Stop reason: CLARIF

## 2019-05-22 NOTE — PROGRESS NOTES
COLPOSCOPY:    29 y.o. female patient with Patient's last menstrual period was 05/11/2019.  presents for colposcopy.  UPT is negative.  Her most recent papsmear showed LGSIL.  She has no prior history of abnormal paps or dysplasia.    PRE-COLPOSCOPY PROCEDURE COUNSELING:  Discussed the abnormal pap test findings, HPV, need for colposcopy and possible biopsies to determine a diagnosis and plan of care, treatments available, the minimal risks of bleeding and infection with a colposcopy, alternatives to colposcopy and she agrees to proceed.  COLPOSCOPY EXAM:   TIME OUT PERFORMED. There were no vulvar lesions suggestive of condyloma present. The cervix was visualized with a speculum. Acetic acid was applied. The entire tranformation zone could be adequately visualized. White feathery irregular epithelium was present at/from 11 to 2 o'clock. Punctation was not present. Mosaic pattern was not present. Biopsy performed: Location: At 12; 1; and 6 o'clock. Possible small polyp at 8 o'clock also sampled. ECC - Done.    Specimens placed in formalin and sent to pathology. AgNO3 was applied at biopsy sites to stop bleeding.    The speculum was removed. The patient tolerated the procedure well.    IMPRESSION:   Abnormal Pap 795.09  Colposcopy  Satisfactory:     POST COLPOSCOPY COUNSELING:   Manage post colposcopy cramping with NSAIDs, Tylenol or Rx per [facility].  Avoid anything in vagina (intercourse, douching, tampons) one week after the procedure.  Expect a clumpy blackish vaginal discharge for several days.  Report bleeding heavier than a period, worsening pain, fever > 101.0 F, or foul-smelling vaginal discharge.  HPV vaccine recommended according to FDA age guidelines.  Importance of follow-up stressed.    Counseling lasted approximately 15 minutes and all her questions were answered.    FOLLOW-UP:   Based on biopsy results.

## 2019-05-28 ENCOUNTER — TELEPHONE (OUTPATIENT)
Dept: OBSTETRICS AND GYNECOLOGY | Facility: CLINIC | Age: 29
End: 2019-05-28

## 2019-05-28 NOTE — TELEPHONE ENCOUNTER
Patient notified of bx and ECC results. All questions answered and patient verbalized understanding. Will call back to schedule repeat pap.

## 2019-05-28 NOTE — TELEPHONE ENCOUNTER
Inform patient biopsy and ECC results did not show any problems that need further treatment---recommend repeat Pap in 6 months

## 2020-02-20 NOTE — PLAN OF CARE
Problem: Breastfeeding (Adult,Obstetrics,Pediatric)  Goal: Signs and Symptoms of Listed Potential Problems Will be Absent, Minimized or Managed (Breastfeeding)  Signs and symptoms of listed potential problems will be absent, minimized or managed by discharge/transition of care (reference Breastfeeding (Adult,Obstetrics,Pediatric) C[facility]).  Outcome: Ongoing (interventions implemented as appropriate)  Pt desires to breastfeed, educated on benefits and skin to skin. Pt can recall all information.        Yes

## 2020-03-23 ENCOUNTER — PATIENT MESSAGE (OUTPATIENT)
Dept: INTERNAL MEDICINE | Facility: CLINIC | Age: 30
End: 2020-03-23

## 2020-03-23 ENCOUNTER — TELEPHONE (OUTPATIENT)
Dept: INTERNAL MEDICINE | Facility: CLINIC | Age: 30
End: 2020-03-23

## 2020-03-23 ENCOUNTER — OFFICE VISIT (OUTPATIENT)
Dept: INTERNAL MEDICINE | Facility: CLINIC | Age: 30
End: 2020-03-23
Payer: COMMERCIAL

## 2020-03-23 DIAGNOSIS — J45.909 ASTHMA, UNSPECIFIED ASTHMA SEVERITY, UNSPECIFIED WHETHER COMPLICATED, UNSPECIFIED WHETHER PERSISTENT: ICD-10-CM

## 2020-03-23 DIAGNOSIS — J40 BRONCHITIS: ICD-10-CM

## 2020-03-23 PROCEDURE — 99202 OFFICE O/P NEW SF 15 MIN: CPT | Mod: 95,,, | Performed by: INTERNAL MEDICINE

## 2020-03-23 PROCEDURE — 99202 PR OFFICE/OUTPT VISIT, NEW, LEVL II, 15-29 MIN: ICD-10-PCS | Mod: 95,,, | Performed by: INTERNAL MEDICINE

## 2020-03-23 RX ORDER — PREDNISONE 20 MG/1
40 TABLET ORAL DAILY
Qty: 10 TABLET | Refills: 0 | Status: SHIPPED | OUTPATIENT
Start: 2020-03-23 | End: 2020-03-28

## 2020-03-23 RX ORDER — ALBUTEROL SULFATE 90 UG/1
1-2 AEROSOL, METERED RESPIRATORY (INHALATION) EVERY 6 HOURS PRN
Qty: 8 G | Refills: 0 | Status: SHIPPED | OUTPATIENT
Start: 2020-03-23 | End: 2022-06-21 | Stop reason: SDUPTHER

## 2020-03-23 NOTE — TELEPHONE ENCOUNTER
----- Message from Antonina Seaman sent at 3/23/2020  9:38 AM CDT -----  Contact: Self [phone]  Patient is returning a phone call.  Who left a message for the patient: Teresita  Does patient know what this is regarding:  Appointment today  Comments:

## 2020-03-23 NOTE — PROGRESS NOTES
If thumb Subjective:       Patient ID: Siobhan Chappell is a 30 y.o. female.    Chief Complaint: Follow-up (asthama, ER visit)    The patient location is: Home  The chief complaint leading to consultation is: Asthma, fever, coughing, wheezing.   Visit type: Virtual visit with synchronous audio and video  Total time spent with patient: 15 minutes  Each patient to whom he or she provides medical services by telemedicine is:  (1) informed of the relationship between the physician and patient and the respective role of any other health care provider with respect to management of the patient; and (2) notified that he or she may decline to receive medical services by telemedicine and may withdraw from such care at any time.    Notes: Last week, fever and cough. Dx with Sinusitis, took antibiotic for Zpak and Steroid shot.   Doing well then fever came back day 4-5. Fever 102.8 4 days ago. Asthma seemed to act up but no inhaler.     As it turns out the patient went to the emergency room earlier this morning and was evaluated for asthma with wheezing.  It appears that they gave her a steroid injection and gave her a prescription for albuterol inhaler which she did not have, a prednisone prescription and Tylenol.  I do not see that she was tested for Covid and she says she feels much better than she did last weekend.  She was just concerned about the persistent wheezing.  When I pointed out that the provider had seemingly done a prescription for both albuterol and prednisone did I had the patient get her paperwork from earlier this morning and in that she found prescriptions for those meds that had been written by the doctor.  She was puzzled because she received a text from [employer] stating that her meds were ready.  I explained that he may have done it in both manners in case they were out or closed.  She does seem to be breathing well on the video, talking without any shortness of breath and basically having no  active or acute problems.    She felt better knowing that she had access to the prescriptions and she will check with the pharmacy to see what is ready and turned those in if needed.    Review of Systems   Constitutional: Positive for fever (Improving). Negative for chills, diaphoresis and fatigue.   HENT: Positive for congestion.    Respiratory: Positive for cough and wheezing. Negative for shortness of breath.    Skin: Negative for rash.   Neurological: Negative for headaches.       Objective:      Physical Exam   Constitutional: She is oriented to person, place, and time.   Patient appeared well on the video visit   HENT:   Head: Normocephalic and atraumatic.   Neurological: She is alert and oriented to person, place, and time.   Psychiatric: She has a normal mood and affect. Her behavior is normal.       Assessment:       1. Asthma, unspecified asthma severity, unspecified whether complicated, unspecified whether persistent    2. Bronchitis        Plan:       Siobhan was seen today for follow-up.    Diagnoses and all orders for this visit:    Asthma, unspecified asthma severity, unspecified whether complicated, unspecified whether persistent    Bronchitis  Comments:  Finished all antibiotics associated with the prior diagnosis  Orders:  -     albuterol (PROVENTIL/VENTOLIN HFA) 90 mcg/actuation inhaler; Inhale 1-2 puffs into the lungs every 6 (six) hours as needed for Wheezing. Rescue  -     predniSONE (DELTASONE) 20 MG tablet; Take 2 tablets (40 mg total) by mouth once daily. for 5 days        continue symptomatic treatment.  Fill prescriptions as advised.  Monitor for recurrent fever worsening cough or shortness of breath.

## 2020-06-17 ENCOUNTER — HOSPITAL ENCOUNTER (OUTPATIENT)
Dept: RADIOLOGY | Facility: HOSPITAL | Age: 30
Discharge: HOME | End: 2020-06-17
Attending: INTERNAL MEDICINE
Payer: COMMERCIAL

## 2020-06-17 ENCOUNTER — OFFICE VISIT (OUTPATIENT)
Dept: INTERNAL MEDICINE | Facility: CLINIC | Age: 30
End: 2020-06-17
Payer: COMMERCIAL

## 2020-06-17 VITALS
SYSTOLIC BLOOD PRESSURE: 116 MMHG | WEIGHT: 209 LBS | HEART RATE: 86 BPM | HEIGHT: 62 IN | OXYGEN SATURATION: 99 % | DIASTOLIC BLOOD PRESSURE: 80 MMHG | BODY MASS INDEX: 38.46 KG/M2

## 2020-06-17 DIAGNOSIS — M54.42 CHRONIC BILATERAL LOW BACK PAIN WITH BILATERAL SCIATICA: ICD-10-CM

## 2020-06-17 DIAGNOSIS — R20.0 BILATERAL LEG NUMBNESS: ICD-10-CM

## 2020-06-17 DIAGNOSIS — Z00.00 ROUTINE PHYSICAL EXAMINATION: Primary | ICD-10-CM

## 2020-06-17 DIAGNOSIS — M54.9 DORSALGIA, UNSPECIFIED: ICD-10-CM

## 2020-06-17 DIAGNOSIS — M54.41 CHRONIC BILATERAL LOW BACK PAIN WITH BILATERAL SCIATICA: ICD-10-CM

## 2020-06-17 DIAGNOSIS — G89.29 CHRONIC BILATERAL LOW BACK PAIN WITH BILATERAL SCIATICA: ICD-10-CM

## 2020-06-17 DIAGNOSIS — R39.15 URINARY URGENCY: ICD-10-CM

## 2020-06-17 DIAGNOSIS — N92.6 IRREGULAR MENSES: ICD-10-CM

## 2020-06-17 DIAGNOSIS — F32.A DEPRESSION, UNSPECIFIED DEPRESSION TYPE: ICD-10-CM

## 2020-06-17 PROCEDURE — 99395 PREV VISIT EST AGE 18-39: CPT | Mod: S$GLB,,, | Performed by: INTERNAL MEDICINE

## 2020-06-17 PROCEDURE — 99999 PR PBB SHADOW E&M-EST. PATIENT-LVL IV: CPT | Mod: PBBFAC,,, | Performed by: INTERNAL MEDICINE

## 2020-06-17 PROCEDURE — 72100 X-RAY EXAM L-S SPINE 2/3 VWS: CPT | Mod: TC

## 2020-06-17 PROCEDURE — 72100 X-RAY EXAM L-S SPINE 2/3 VWS: CPT | Mod: 26,,, | Performed by: RADIOLOGY

## 2020-06-17 PROCEDURE — 72100 XR LUMBAR SPINE AP AND LATERAL: ICD-10-PCS | Mod: 26,,, | Performed by: RADIOLOGY

## 2020-06-17 PROCEDURE — 99395 PR PREVENTIVE VISIT,EST,18-39: ICD-10-PCS | Mod: S$GLB,,, | Performed by: INTERNAL MEDICINE

## 2020-06-17 PROCEDURE — 99999 PR PBB SHADOW E&M-EST. PATIENT-LVL IV: ICD-10-PCS | Mod: PBBFAC,,, | Performed by: INTERNAL MEDICINE

## 2020-06-17 RX ORDER — CITALOPRAM 20 MG/1
20 TABLET, FILM COATED ORAL DAILY
Qty: 30 TABLET | Refills: 11 | Status: SHIPPED | OUTPATIENT
Start: 2020-06-17 | End: 2020-10-05

## 2020-06-17 NOTE — PROGRESS NOTES
Subjective:       Patient ID: Siobhan Chappell is a 30 y.o. female.    Chief Complaint: Annual Exam (Est Care, Back Pain, Numbess and Tingling of legs, Depression )    Percent previously seen by me for UC, here to establish care.  She says she has 3 issues to discuss.  Them been going on for few weeks to a few months.  She has had some urinary urgency and worries about a UTI.  She says she has had them in the past.  She also has been having some back pain with intermittent pain and numbness into her legs.  That has been going on for a few months.  It can occur after prolonged sitting and sometimes with walking.  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  She also has had a lot of stress caring for her mom and her younger sister who has a baby.  She says they were living with her for a while and line 1 her financially.  She has just had some periods of feeling down and depressed and anxious.  She wanted to talk about possibly trying an antidepressant.  She denies any major problems with her back in the past.  No neck pain.  No obvious injuries.  Sometimes the numbness goes down to her feet her mom has a history of lupus diabetes and kidney issues.  The patient is approximately 3 days late for her menstrual cycle and also states she quit taking her birth control pill after her last menstrual cycle.  She has been sexually active since then.    Review of Systems   Constitutional: Negative for activity change and unexpected weight change.   HENT: Negative for hearing loss, rhinorrhea and trouble swallowing.    Eyes: Negative for discharge and visual disturbance.   Respiratory: Negative for chest tightness and wheezing.    Cardiovascular: Negative for chest pain and palpitations.   Gastrointestinal: Negative for blood in stool, constipation, diarrhea and vomiting.   Endocrine: Negative  for polydipsia and polyuria.   Genitourinary: Positive for difficulty urinating and menstrual irregularity. Negative for dysuria, hematuria and menstrual problem.   Musculoskeletal: Positive for arthralgias and back pain. Negative for joint swelling and neck pain.   Neurological: Positive for numbness. Negative for weakness and headaches.   Psychiatric/Behavioral: Positive for dysphoric mood. Negative for confusion.         Objective:      Physical Exam  Constitutional:       General: She is not in acute distress.     Appearance: She is well-developed.      Comments: Overweight female     HENT:      Head: Normocephalic and atraumatic.      Right Ear: External ear normal.      Left Ear: External ear normal.      Mouth/Throat:      Pharynx: No oropharyngeal exudate or posterior oropharyngeal erythema.   Eyes:      General: No scleral icterus.     Conjunctiva/sclera: Conjunctivae normal.      Pupils: Pupils are equal, round, and reactive to light.   Neck:      Musculoskeletal: Normal range of motion and neck supple.      Thyroid: No thyromegaly.   Cardiovascular:      Rate and Rhythm: Normal rate and regular rhythm.      Heart sounds: No murmur.   Pulmonary:      Effort: Pulmonary effort is normal.      Breath sounds: Normal breath sounds. No wheezing.   Abdominal:      General: Bowel sounds are normal. There is no distension.      Palpations: Abdomen is soft.      Tenderness: There is no abdominal tenderness.   Musculoskeletal:         General: Tenderness (mild low back) present.      Right lower leg: No edema.      Left lower leg: No edema.      Comments: No tenderness with range of motion of the lower extremities.   Lymphadenopathy:      Cervical: No cervical adenopathy.   Skin:     Findings: No bruising, erythema or rash.   Neurological:      Mental Status: She is alert and oriented to person, place, and time.      Sensory: Sensory deficit (Distal lower extremities and he-nonspecific) present.   Psychiatric:          Mood and Affect: Mood normal.         Behavior: Behavior normal.         Thought Content: Thought content normal.         Assessment:       1. Routine physical examination    2. Urinary urgency    3. Irregular menses    4. Chronic bilateral low back pain with bilateral sciatica    5. Dorsalgia, unspecified    6. Bilateral leg numbness    7. Depression, unspecified depression type        Plan:       Siobhan was seen today for annual exam.    Diagnoses and all orders for this visit:    Routine physical examination  -     Comprehensive metabolic panel; Future  -     CBC auto differential; Future  -     TSH; Future  -     Vitamin B12; Future  -     Hemoglobin A1C; Future  -     Lipid Panel; Future    Urinary urgency  -     Urine culture; Future    Irregular menses  -     POCT urine pregnancy  -     AUNG Screen w/Reflex; Future    Chronic bilateral low back pain with bilateral sciatica  -     Comprehensive metabolic panel; Future  -     CBC auto differential; Future  -     TSH; Future  -     Vitamin B12; Future  -     Hemoglobin A1C; Future  -     Lipid Panel; Future  -     X-Ray Lumbar Spine Ap And Lateral; Future  -     AUNG Screen w/Reflex; Future    Dorsalgia, unspecified  -     X-Ray Lumbar Spine Ap And Lateral; Future    Bilateral leg numbness  -     X-Ray Lumbar Spine Ap And Lateral; Future    Depression, unspecified depression type    Other orders  -     citalopram (CELEXA) 20 MG tablet; Take 1 tablet (20 mg total) by mouth once daily.        UPT negative so we will proceed with x-ray and labs and starting antidepressant.  Follow-up in a few weeks.

## 2020-06-18 ENCOUNTER — PATIENT MESSAGE (OUTPATIENT)
Dept: INTERNAL MEDICINE | Facility: CLINIC | Age: 30
End: 2020-06-18

## 2020-06-18 DIAGNOSIS — D64.9 ANEMIA, UNSPECIFIED TYPE: Primary | ICD-10-CM

## 2020-06-18 RX ORDER — FERROUS SULFATE 325(65) MG
325 TABLET ORAL DAILY
Qty: 30 TABLET | Refills: 3 | Status: SHIPPED | OUTPATIENT
Start: 2020-06-18 | End: 2020-10-05

## 2020-06-18 NOTE — TELEPHONE ENCOUNTER
Please help patient get a repeat blood count a few days before her follow-up visit with me next month.     Lumbar spine x-ray showed  Mild scoliosis and Lumbosacral narrowing may account for back pain    Labs show significant anemia with mildly low calcium and B12.  I suggested the patient start a multivitamin with calcium and B12 and prescription iron which I will send to her pharmacy      We will repeat blood count a few days before her follow-up visit with me next month

## 2020-06-22 DIAGNOSIS — N39.0 ACUTE UTI: Primary | ICD-10-CM

## 2020-06-22 RX ORDER — SULFAMETHOXAZOLE AND TRIMETHOPRIM 800; 160 MG/1; MG/1
1 TABLET ORAL 2 TIMES DAILY
Qty: 10 TABLET | Refills: 0 | Status: SHIPPED | OUTPATIENT
Start: 2020-06-22 | End: 2020-06-27

## 2020-06-29 ENCOUNTER — TELEPHONE (OUTPATIENT)
Dept: ADMINISTRATIVE | Facility: OTHER | Age: 30
End: 2020-06-29

## 2020-06-29 NOTE — TELEPHONE ENCOUNTER
Left voice message for patient to return call to schedule appointment from referral to Rheumatology.  Nikki ROGER [phone]

## 2020-09-03 ENCOUNTER — PATIENT OUTREACH (OUTPATIENT)
Dept: ADMINISTRATIVE | Facility: OTHER | Age: 30
End: 2020-09-03

## 2020-09-04 NOTE — PROGRESS NOTES
Care Everywhere: updated  Immunization: no profile in links   Health Maintenance: updated  Media Review:   Legacy Review:   Order placed:   Upcoming appts:

## 2020-09-15 ENCOUNTER — TELEPHONE (OUTPATIENT)
Dept: OBSTETRICS AND GYNECOLOGY | Facility: CLINIC | Age: 30
End: 2020-09-15

## 2020-09-15 NOTE — TELEPHONE ENCOUNTER
Okay. I thought so because I was able to do so yesterday. Im not sure what Im doing wrong today. I can figure out.      Thank you,   Siobhan Chappell

## 2020-09-28 ENCOUNTER — TELEPHONE (OUTPATIENT)
Dept: OBSTETRICS AND GYNECOLOGY | Facility: CLINIC | Age: 30
End: 2020-09-28

## 2020-09-28 NOTE — TELEPHONE ENCOUNTER
Patient informed that she would have to be here before 11:15am since provider is on call and will not see any late patients

## 2020-10-05 ENCOUNTER — OFFICE VISIT (OUTPATIENT)
Dept: OBSTETRICS AND GYNECOLOGY | Facility: CLINIC | Age: 30
End: 2020-10-05
Payer: COMMERCIAL

## 2020-10-05 VITALS
BODY MASS INDEX: 36.47 KG/M2 | WEIGHT: 198.19 LBS | SYSTOLIC BLOOD PRESSURE: 116 MMHG | DIASTOLIC BLOOD PRESSURE: 72 MMHG | HEIGHT: 62 IN

## 2020-10-05 DIAGNOSIS — Z12.4 PAP SMEAR FOR CERVICAL CANCER SCREENING: Primary | ICD-10-CM

## 2020-10-05 DIAGNOSIS — Z30.017 ENCOUNTER FOR INITIAL PRESCRIPTION OF NEXPLANON: ICD-10-CM

## 2020-10-05 DIAGNOSIS — N30.01 ACUTE CYSTITIS WITH HEMATURIA: ICD-10-CM

## 2020-10-05 PROCEDURE — 99999 PR PBB SHADOW E&M-EST. PATIENT-LVL III: CPT | Mod: PBBFAC,,, | Performed by: OBSTETRICS & GYNECOLOGY

## 2020-10-05 PROCEDURE — 99999 PR PBB SHADOW E&M-EST. PATIENT-LVL III: ICD-10-PCS | Mod: PBBFAC,,, | Performed by: OBSTETRICS & GYNECOLOGY

## 2020-10-05 PROCEDURE — 99395 PR PREVENTIVE VISIT,EST,18-39: ICD-10-PCS | Mod: S$GLB,,, | Performed by: OBSTETRICS & GYNECOLOGY

## 2020-10-05 PROCEDURE — 88142 CYTOPATH C/V THIN LAYER: CPT

## 2020-10-05 PROCEDURE — 87086 URINE CULTURE/COLONY COUNT: CPT

## 2020-10-05 PROCEDURE — 99395 PREV VISIT EST AGE 18-39: CPT | Mod: S$GLB,,, | Performed by: OBSTETRICS & GYNECOLOGY

## 2020-10-05 NOTE — PROGRESS NOTES
"GYNECOLOGY  :  Siobhan Chappell is a 30 y.o.    Here today for  gyn annual visit     No gyn  complaints  Normal menstrual cycles   Hx of LGSIL  In 2019 , negative colpo   No Hx Abnormal Mammogram   Last gyn visit on:    Past Medical History:   Diagnosis Date    Asthma      Past Surgical History:   Procedure Laterality Date     SECTION       Family History   Problem Relation Age of Onset    Diabetes Mother     Lupus Mother     No Known Problems Sister     No Known Problems Brother     No Known Problems Sister     No Known Problems Sister     No Known Problems Sister      Social History     Tobacco Use    Smoking status: Never Smoker    Smokeless tobacco: Never Used   Substance Use Topics    Alcohol use: No     Alcohol/week: 0.0 standard drinks     Frequency: 2-4 times a month     Drinks per session: 1 or 2     Binge frequency: Never    Drug use: No     OB History    Para Term  AB Living   4 3 2 0 1 2   SAB TAB Ectopic Multiple Live Births         0 2      # Outcome Date GA Lbr Emre/2nd Weight Sex Delivery Anes PTL Lv   4 Term 18 39w1d  3.817 kg (8 lb 6.6 oz) F CS-LTranv Spinal N MOLLY   3 AB            2 Para 08    M CS-Classical  N MOLLY   1 Term                /72 (BP Location: Left arm)   Ht 5' 2" (1.575 m)   Wt 89.9 kg (198 lb 3.1 oz)   LMP 09/15/2020   BMI 36.25 kg/m²     Last PAP= 2019 LGSIL  LMP = 9/15/20  Last Mammogram = -  Last Colonoscopy  = -      COMPREHENSIVE GYN HISTORY:  G 4 P 3 C/S x2      PAP History: Denies abnormal Paps.  Infection History: Denies STDs. Denies PID.  Benign History: Denies uterine fibroids. Denies ovarian cysts. Denies endometriosis.   Cancer History: Denies cervical cancer. Denies uterine cancer or hyperplasia. Denies ovarian cancer. Denies vulvar cancer or pre-cancer. Denies vaginal cancer or pre-cancer. Denies breast cancer. Denies colon cancer.  Sexual Activity History: (  x ) Yes   ( - )   no "   Menstrual History: Age of menarche: (  14 )  years. Every (  30 )       days, flows for (  5 )   days. moderate  flow.  Dysmenorrhea History:  absent  Contraception:  Wants nexplanon     ROS  GENERAL: Denies significant weight gain or weight loss. Feeling well overall.  SKIN: Denies rash or lesions.  Normal skin turgor  HEAD: Denies head injury or headache.   NODES: Denies enlarged lymph nodes.   CHEST: Denies chest pain or shortness of breath.   CARDIOVASCULAR: Denies palpitations or left sided chest pain.   ABDOMEN: No abdominal pain,no  diarrhea,constipation  nausea, vomiting or rectal bleeding.   URINARY: normal  Frequency,no  Dysuria or burning on urination.   REPRODUCTIVE: Per HPI   BREASTS: The patient sometimes performs breast self-examination and denies pain, lumps, or nipple discharge.   HEMATOLOGIC: No easy bruisability or excessive bleeding.   MUSCULOSKELETAL: Denies joint pain or swelling.   NEUROLOGIC: Denies syncope or weakness.   PSYCHIATRIC: Denies depression, anxiety or mood swings.    Physical Exam     Constitutional: She is oriented to person, place, and time. She appears well-developed and well-nourished. No distress.   HENT:   Head: Normocephalic.   NECK: Neck symmetric without masses or thyromegaly.  Cardiovascular: Normal rate and regular rhythm.   Pulmonary/Chest: Effort normal and breath sounds normal. No respiratory distress. She has no wheezes.   Breasts: Symmetrical, no skin changes or visible lesions. No palpable masses, nipple discharge or adenopathy bilaterally.  Abdominal: Soft not distended. Bowel sounds are normal. She exhibits   no masses . No tenderness to palpation.   Genitourinary: Pelvic exam was performed with patient supine.   External genitalia normal no lesions.Normal hair distribution   Adequate perineal body,normal urethral meatus   Vagina moist and well rugated without lesions, no vaginal  Discharge.   Cervix pink and without lesions. No bleeding. No significant  cystocele or rectocele.  Bimanual exam showed uterus normal size, shape and position , mobile and nontender. Adnexa without masses or tenderness. Urethra and bladder normal  Extremities normal no cyanosis ,edema.     Procedures:  Pap smear      A/P Siobhan Chappell 30 y.o.     Dx=  1- Routine gyn visit   2-cervical cancer screening   3-hx LGSIL   4- Contraception counseling     Plan:  Routine gyn   -s/p normal breast exam   -s/p normal pelvic exam:  Desires nexplanon , requested     Patient was counseled today on A.C.S. Pap guidelines and recommendations for yearly pelvic exams, mammograms and monthly self breast exams; to see her PCP for other health maintenance, nutrition and weight gain counseling, discussed lab values.  Discussed colonoscopy recommendations every 10 years starting at age 50   Calcium and vit D daily intake     F/u in month for nexplanon insertion     Abhinav Jain M.D.   OB/GYN

## 2020-10-07 LAB — BACTERIA UR CULT: NO GROWTH

## 2020-10-12 ENCOUNTER — TELEPHONE (OUTPATIENT)
Dept: OBSTETRICS AND GYNECOLOGY | Facility: CLINIC | Age: 30
End: 2020-10-12

## 2020-10-12 ENCOUNTER — CLINICAL SUPPORT (OUTPATIENT)
Dept: URGENT CARE | Facility: CLINIC | Age: 30
End: 2020-10-12

## 2020-10-12 VITALS
HEIGHT: 62 IN | WEIGHT: 198.19 LBS | TEMPERATURE: 97 F | HEART RATE: 94 BPM | OXYGEN SATURATION: 99 % | BODY MASS INDEX: 36.47 KG/M2

## 2020-10-12 DIAGNOSIS — Z11.1 PPD SCREENING TEST: Primary | ICD-10-CM

## 2020-10-12 PROCEDURE — 86580 POCT TB SKIN TEST: ICD-10-PCS | Mod: S$GLB,,, | Performed by: INTERNAL MEDICINE

## 2020-10-12 PROCEDURE — 86580 TB INTRADERMAL TEST: CPT | Mod: S$GLB,,, | Performed by: INTERNAL MEDICINE

## 2020-10-12 NOTE — TELEPHONE ENCOUNTER
Patient called and informed that her nexplanon is in the office. Patient verbalized understanding and is scheduled for insertion.

## 2020-10-14 LAB
TB INDURATION - 48 HR READ: 0 MM
TB INDURATION - 72 HR READ: NORMAL
TB SKIN TEST - 48 HR READ: NEGATIVE
TB SKIN TEST - 72 HR READ: NORMAL

## 2020-10-20 ENCOUNTER — PATIENT OUTREACH (OUTPATIENT)
Dept: ADMINISTRATIVE | Facility: OTHER | Age: 30
End: 2020-10-20

## 2020-10-20 NOTE — PROGRESS NOTES
Updates were requested from care everywhere.  Chart was reviewed for overdue Proactive [facility] Encounters (CHARLENE) topics (CRS, Breast Cancer Screening, Eye exam)  Health Maintenance has been updated.  LINKS not responding.

## 2020-10-22 ENCOUNTER — OFFICE VISIT (OUTPATIENT)
Dept: OBSTETRICS AND GYNECOLOGY | Facility: CLINIC | Age: 30
End: 2020-10-22
Payer: COMMERCIAL

## 2020-10-22 VITALS
BODY MASS INDEX: 36.25 KG/M2 | DIASTOLIC BLOOD PRESSURE: 70 MMHG | WEIGHT: 198.19 LBS | SYSTOLIC BLOOD PRESSURE: 120 MMHG

## 2020-10-22 DIAGNOSIS — Z30.017 NEXPLANON INSERTION: Primary | ICD-10-CM

## 2020-10-22 PROCEDURE — 99499 UNLISTED E&M SERVICE: CPT | Mod: S$GLB,,, | Performed by: OBSTETRICS & GYNECOLOGY

## 2020-10-22 PROCEDURE — 11981 PR INSERT, DRUG DELIVERY IMPLANT, BIORESORB/BIODEGR/NON-BIODEGR: ICD-10-PCS | Mod: S$GLB,,, | Performed by: OBSTETRICS & GYNECOLOGY

## 2020-10-22 PROCEDURE — 11981 INSERTION DRUG DLVR IMPLANT: CPT | Mod: S$GLB,,, | Performed by: OBSTETRICS & GYNECOLOGY

## 2020-10-22 PROCEDURE — 99499 NO LOS: ICD-10-PCS | Mod: S$GLB,,, | Performed by: OBSTETRICS & GYNECOLOGY

## 2020-10-22 PROCEDURE — 3008F BODY MASS INDEX DOCD: CPT | Mod: CPTII,S$GLB,, | Performed by: OBSTETRICS & GYNECOLOGY

## 2020-10-22 PROCEDURE — 99999 PR PBB SHADOW E&M-EST. PATIENT-LVL II: CPT | Mod: PBBFAC,,, | Performed by: OBSTETRICS & GYNECOLOGY

## 2020-10-22 PROCEDURE — 3008F PR BODY MASS INDEX (BMI) DOCUMENTED: ICD-10-PCS | Mod: CPTII,S$GLB,, | Performed by: OBSTETRICS & GYNECOLOGY

## 2020-10-22 PROCEDURE — 99999 PR PBB SHADOW E&M-EST. PATIENT-LVL II: ICD-10-PCS | Mod: PBBFAC,,, | Performed by: OBSTETRICS & GYNECOLOGY

## 2020-10-22 NOTE — PROGRESS NOTES
NEXPLANON  INSERTION:      PRE- INSERTION COUNSELING:  All contraceptive options were reviewed and the patient chooses NEXPLANON  Patients history was reviewed and there were no contraindications to Implanon.  The procedure and minimal risks of pain, bleeding, bruising and infection at the insertion site discussed. Possible irregular menstrual bleeding pattern versus amenorrhea was explained.  No protection against STDs discussed.  Written information provided; all questions answered and patient agrees to proceed.  Consent signed and scanned into computer.    EXAM:  With patient in supine position the nondominant arm was flexed at the elbow and externally rotated.  The insertion site was identified 6-8 cm above the elbow crease at the inner side of the upper arm overlying the groove between biceps and triceps.  The insertion site was marked and a second lloyd was placed 6-8 cm above the first.    PROCEDURE:  TIME OUT PERFORMED.  The insertion site was prepped with antiseptic and injected with 2 cc of 1% Xylocaine without epinephrine subq along the planned insertion canal.  Xylocaine subq along the planned insertion canal.  Using sterile technique the Implanon applicator was visually verified and removed from the blister pack.  The base of the applicator was gently tapped with the needle pointed up until the implant disappeared back into the needle and the needle cap was removed.  The needle tip was inserted bevel side up at a 20 degree angle to penetrate the skin.  The applicator was lowered parallel to the arm and the skin was tented with the needle.  The seal of the applicator was broken by pressing the obturator support and turned 90degrees.  The obturator tip was fixed in place on the arm with one hand and with the other hand the needle was slowly and fully retracted back along full length of the obturator.  The grooved tip of the obturator was visible inside the needle and the implant waspalpable after  insertion.  A small adhesive bandage and then a pressure bandage was placed over the insertion site.  The patient tolerated the procedure well.    ASSESSMENT:  1. Contraception management / Implanon insertion.V25.0.    POST IMPLANON INSERTION COUNSELING:  Manage post Implanon placement arm pain with NSAIDs, Tylenol or Rx per MedCard.  Keep arm elevated and apply intermittent ice packs to decrease pain and bruising for 24 Hours.  May remove bandage in 24 hours.  Implanon danger signs (worsening pain at insertion site, bleeding through bandage, redness and/or pus drainage at insertion site).  Removal in 3 years.    Counseling lasted approximately 15 minutes and all her questions were answered.    FOLLOW-UP: with me in two weeks.    Abhinav Jain M.D.   OB/GYN

## 2020-10-29 LAB
FINAL PATHOLOGIC DIAGNOSIS: NORMAL
Lab: NORMAL

## 2021-02-09 ENCOUNTER — PATIENT OUTREACH (OUTPATIENT)
Dept: ADMINISTRATIVE | Facility: OTHER | Age: 31
End: 2021-02-09

## 2021-02-11 ENCOUNTER — OFFICE VISIT (OUTPATIENT)
Dept: OBSTETRICS AND GYNECOLOGY | Facility: CLINIC | Age: 31
End: 2021-02-11
Payer: COMMERCIAL

## 2021-02-11 VITALS
WEIGHT: 196.19 LBS | BODY MASS INDEX: 35.89 KG/M2 | DIASTOLIC BLOOD PRESSURE: 78 MMHG | SYSTOLIC BLOOD PRESSURE: 102 MMHG

## 2021-02-11 DIAGNOSIS — Z30.46 ENCOUNTER FOR NEXPLANON REMOVAL: Primary | ICD-10-CM

## 2021-02-11 PROCEDURE — 1126F PR PAIN SEVERITY QUANTIFIED, NO PAIN PRESENT: ICD-10-PCS | Mod: S$GLB,,, | Performed by: OBSTETRICS & GYNECOLOGY

## 2021-02-11 PROCEDURE — 99499 UNLISTED E&M SERVICE: CPT | Mod: S$GLB,,, | Performed by: OBSTETRICS & GYNECOLOGY

## 2021-02-11 PROCEDURE — 11982 PR REMOVAL DRUG IMPLANT DEVICE: ICD-10-PCS | Mod: S$GLB,,, | Performed by: OBSTETRICS & GYNECOLOGY

## 2021-02-11 PROCEDURE — 99499 NO LOS: ICD-10-PCS | Mod: S$GLB,,, | Performed by: OBSTETRICS & GYNECOLOGY

## 2021-02-11 PROCEDURE — 1126F AMNT PAIN NOTED NONE PRSNT: CPT | Mod: S$GLB,,, | Performed by: OBSTETRICS & GYNECOLOGY

## 2021-02-11 PROCEDURE — 11982 REMOVE DRUG IMPLANT DEVICE: CPT | Mod: S$GLB,,, | Performed by: OBSTETRICS & GYNECOLOGY

## 2021-02-11 PROCEDURE — 99999 PR PBB SHADOW E&M-EST. PATIENT-LVL II: CPT | Mod: PBBFAC,,, | Performed by: OBSTETRICS & GYNECOLOGY

## 2021-02-11 PROCEDURE — 3008F PR BODY MASS INDEX (BMI) DOCUMENTED: ICD-10-PCS | Mod: CPTII,S$GLB,, | Performed by: OBSTETRICS & GYNECOLOGY

## 2021-02-11 PROCEDURE — 3008F BODY MASS INDEX DOCD: CPT | Mod: CPTII,S$GLB,, | Performed by: OBSTETRICS & GYNECOLOGY

## 2021-02-11 PROCEDURE — 99999 PR PBB SHADOW E&M-EST. PATIENT-LVL II: ICD-10-PCS | Mod: PBBFAC,,, | Performed by: OBSTETRICS & GYNECOLOGY

## 2021-02-11 RX ORDER — DEXBROMPHENIRAMINE MALEATE AND PHENYLEPHRINE HYDROCHLORIDE 2; 10 MG/1; MG/1
TABLET ORAL
COMMUNITY
Start: 2020-03-15 | End: 2021-05-23 | Stop reason: CLARIF

## 2021-02-11 RX ORDER — BENZONATATE 200 MG/1
1 CAPSULE ORAL
COMMUNITY
Start: 2020-03-15 | End: 2021-05-23 | Stop reason: CLARIF

## 2021-02-11 RX ORDER — AZITHROMYCIN 250 MG/1
TABLET, FILM COATED ORAL
COMMUNITY
Start: 2020-03-15 | End: 2021-05-23 | Stop reason: CLARIF

## 2021-02-23 ENCOUNTER — PATIENT MESSAGE (OUTPATIENT)
Dept: RHEUMATOLOGY | Facility: CLINIC | Age: 31
End: 2021-02-23

## 2021-04-23 ENCOUNTER — OFFICE VISIT (OUTPATIENT)
Dept: URGENT CARE | Facility: CLINIC | Age: 31
End: 2021-04-23
Payer: COMMERCIAL

## 2021-04-23 VITALS
DIASTOLIC BLOOD PRESSURE: 80 MMHG | OXYGEN SATURATION: 98 % | BODY MASS INDEX: 38.28 KG/M2 | HEIGHT: 62 IN | SYSTOLIC BLOOD PRESSURE: 121 MMHG | HEART RATE: 75 BPM | RESPIRATION RATE: 18 BRPM | WEIGHT: 208 LBS | TEMPERATURE: 98 F

## 2021-04-23 DIAGNOSIS — J02.9 VIRAL PHARYNGITIS: Primary | ICD-10-CM

## 2021-04-23 DIAGNOSIS — J02.9 SORE THROAT: ICD-10-CM

## 2021-04-23 LAB
CTP QC/QA: YES
MOLECULAR STREP A: NEGATIVE

## 2021-04-23 PROCEDURE — 3008F BODY MASS INDEX DOCD: CPT | Mod: CPTII,S$GLB,, | Performed by: NURSE PRACTITIONER

## 2021-04-23 PROCEDURE — 3008F PR BODY MASS INDEX (BMI) DOCUMENTED: ICD-10-PCS | Mod: CPTII,S$GLB,, | Performed by: NURSE PRACTITIONER

## 2021-04-23 PROCEDURE — 99214 OFFICE O/P EST MOD 30 MIN: CPT | Mod: S$GLB,,, | Performed by: NURSE PRACTITIONER

## 2021-04-23 PROCEDURE — 87651 POCT STREP A MOLECULAR: ICD-10-PCS | Mod: QW,S$GLB,, | Performed by: NURSE PRACTITIONER

## 2021-04-23 PROCEDURE — 87651 STREP A DNA AMP PROBE: CPT | Mod: QW,S$GLB,, | Performed by: NURSE PRACTITIONER

## 2021-04-23 PROCEDURE — 99214 PR OFFICE/OUTPT VISIT, EST, LEVL IV, 30-39 MIN: ICD-10-PCS | Mod: S$GLB,,, | Performed by: NURSE PRACTITIONER

## 2021-05-24 ENCOUNTER — OFFICE VISIT (OUTPATIENT)
Dept: OBSTETRICS AND GYNECOLOGY | Facility: CLINIC | Age: 31
End: 2021-05-24
Payer: COMMERCIAL

## 2021-05-24 ENCOUNTER — PATIENT OUTREACH (OUTPATIENT)
Dept: ADMINISTRATIVE | Facility: OTHER | Age: 31
End: 2021-05-24

## 2021-05-24 ENCOUNTER — LAB VISIT (OUTPATIENT)
Dept: LAB | Facility: HOSPITAL | Age: 31
End: 2021-05-24
Attending: INTERNAL MEDICINE
Payer: COMMERCIAL

## 2021-05-24 VITALS
BODY MASS INDEX: 39.03 KG/M2 | SYSTOLIC BLOOD PRESSURE: 102 MMHG | DIASTOLIC BLOOD PRESSURE: 78 MMHG | WEIGHT: 213.38 LBS

## 2021-05-24 DIAGNOSIS — D64.9 ANEMIA, UNSPECIFIED TYPE: ICD-10-CM

## 2021-05-24 DIAGNOSIS — Z30.09 GENERAL COUNSELING AND ADVICE FOR CONTRACEPTIVE MANAGEMENT: ICD-10-CM

## 2021-05-24 DIAGNOSIS — Z11.3 SCREENING FOR STD (SEXUALLY TRANSMITTED DISEASE): ICD-10-CM

## 2021-05-24 DIAGNOSIS — N93.9 ABNORMAL UTERINE BLEEDING (AUB): ICD-10-CM

## 2021-05-24 DIAGNOSIS — Z11.3 SCREENING FOR STD (SEXUALLY TRANSMITTED DISEASE): Primary | ICD-10-CM

## 2021-05-24 LAB
BASOPHILS # BLD AUTO: 0.04 K/UL (ref 0–0.2)
BASOPHILS NFR BLD: 0.6 % (ref 0–1.9)
DIFFERENTIAL METHOD: ABNORMAL
EOSINOPHIL # BLD AUTO: 0.1 K/UL (ref 0–0.5)
EOSINOPHIL NFR BLD: 0.9 % (ref 0–8)
ERYTHROCYTE [DISTWIDTH] IN BLOOD BY AUTOMATED COUNT: 14.7 % (ref 11.5–14.5)
HCT VFR BLD AUTO: 35.4 % (ref 37–48.5)
HGB BLD-MCNC: 10.9 G/DL (ref 12–16)
IMM GRANULOCYTES # BLD AUTO: 0.01 K/UL (ref 0–0.04)
IMM GRANULOCYTES NFR BLD AUTO: 0.1 % (ref 0–0.5)
LYMPHOCYTES # BLD AUTO: 2 K/UL (ref 1–4.8)
LYMPHOCYTES NFR BLD: 28.3 % (ref 18–48)
MCH RBC QN AUTO: 24.9 PG (ref 27–31)
MCHC RBC AUTO-ENTMCNC: 30.8 G/DL (ref 32–36)
MCV RBC AUTO: 81 FL (ref 82–98)
MONOCYTES # BLD AUTO: 0.5 K/UL (ref 0.3–1)
MONOCYTES NFR BLD: 6.7 % (ref 4–15)
NEUTROPHILS # BLD AUTO: 4.4 K/UL (ref 1.8–7.7)
NEUTROPHILS NFR BLD: 63.4 % (ref 38–73)
NRBC BLD-RTO: 0 /100 WBC
PLATELET # BLD AUTO: 294 K/UL (ref 150–450)
PMV BLD AUTO: 10 FL (ref 9.2–12.9)
RBC # BLD AUTO: 4.38 M/UL (ref 4–5.4)
WBC # BLD AUTO: 6.88 K/UL (ref 3.9–12.7)

## 2021-05-24 PROCEDURE — 80074 ACUTE HEPATITIS PANEL: CPT | Performed by: OBSTETRICS & GYNECOLOGY

## 2021-05-24 PROCEDURE — 99213 PR OFFICE/OUTPT VISIT, EST, LEVL III, 20-29 MIN: ICD-10-PCS | Mod: S$GLB,,, | Performed by: OBSTETRICS & GYNECOLOGY

## 2021-05-24 PROCEDURE — 85025 COMPLETE CBC W/AUTO DIFF WBC: CPT | Performed by: INTERNAL MEDICINE

## 2021-05-24 PROCEDURE — 1126F AMNT PAIN NOTED NONE PRSNT: CPT | Mod: S$GLB,,, | Performed by: OBSTETRICS & GYNECOLOGY

## 2021-05-24 PROCEDURE — 36415 COLL VENOUS BLD VENIPUNCTURE: CPT | Performed by: INTERNAL MEDICINE

## 2021-05-24 PROCEDURE — 3008F BODY MASS INDEX DOCD: CPT | Mod: CPTII,S$GLB,, | Performed by: OBSTETRICS & GYNECOLOGY

## 2021-05-24 PROCEDURE — 86703 HIV-1/HIV-2 1 RESULT ANTBDY: CPT | Performed by: OBSTETRICS & GYNECOLOGY

## 2021-05-24 PROCEDURE — 99999 PR PBB SHADOW E&M-EST. PATIENT-LVL III: ICD-10-PCS | Mod: PBBFAC,,, | Performed by: OBSTETRICS & GYNECOLOGY

## 2021-05-24 PROCEDURE — 1126F PR PAIN SEVERITY QUANTIFIED, NO PAIN PRESENT: ICD-10-PCS | Mod: S$GLB,,, | Performed by: OBSTETRICS & GYNECOLOGY

## 2021-05-24 PROCEDURE — 99999 PR PBB SHADOW E&M-EST. PATIENT-LVL III: CPT | Mod: PBBFAC,,, | Performed by: OBSTETRICS & GYNECOLOGY

## 2021-05-24 PROCEDURE — 82728 ASSAY OF FERRITIN: CPT | Performed by: INTERNAL MEDICINE

## 2021-05-24 PROCEDURE — 3008F PR BODY MASS INDEX (BMI) DOCUMENTED: ICD-10-PCS | Mod: CPTII,S$GLB,, | Performed by: OBSTETRICS & GYNECOLOGY

## 2021-05-24 PROCEDURE — 86592 SYPHILIS TEST NON-TREP QUAL: CPT | Performed by: OBSTETRICS & GYNECOLOGY

## 2021-05-24 PROCEDURE — 83540 ASSAY OF IRON: CPT | Performed by: INTERNAL MEDICINE

## 2021-05-24 PROCEDURE — 99213 OFFICE O/P EST LOW 20 MIN: CPT | Mod: S$GLB,,, | Performed by: OBSTETRICS & GYNECOLOGY

## 2021-05-24 RX ORDER — NORGESTIMATE AND ETHINYL ESTRADIOL 0.25-0.035
1 KIT ORAL DAILY
Qty: 30 TABLET | Refills: 11 | Status: SHIPPED | OUTPATIENT
Start: 2021-05-24 | End: 2022-06-21

## 2021-05-25 ENCOUNTER — PATIENT MESSAGE (OUTPATIENT)
Dept: INTERNAL MEDICINE | Facility: CLINIC | Age: 31
End: 2021-05-25

## 2021-05-25 LAB
FERRITIN SERPL-MCNC: 4 NG/ML (ref 20–300)
HAV IGM SERPL QL IA: NEGATIVE
HBV CORE IGM SERPL QL IA: NEGATIVE
HBV SURFACE AG SERPL QL IA: NEGATIVE
HCV AB SERPL QL IA: NEGATIVE
HIV 1+2 AB+HIV1 P24 AG SERPL QL IA: NEGATIVE
IRON SERPL-MCNC: 66 UG/DL (ref 30–160)
RPR SER QL: NORMAL
SATURATED IRON: 13 % (ref 20–50)
TOTAL IRON BINDING CAPACITY: 499 UG/DL (ref 250–450)
TRANSFERRIN SERPL-MCNC: 337 MG/DL (ref 200–375)

## 2021-05-25 RX ORDER — FERROUS SULFATE 325(65) MG
325 TABLET ORAL DAILY
Qty: 30 TABLET | Refills: 1 | Status: SHIPPED | OUTPATIENT
Start: 2021-05-25 | End: 2022-06-21 | Stop reason: SDUPTHER

## 2021-06-15 ENCOUNTER — LAB VISIT (OUTPATIENT)
Dept: LAB | Facility: HOSPITAL | Age: 31
End: 2021-06-15
Attending: INTERNAL MEDICINE
Payer: COMMERCIAL

## 2021-06-15 ENCOUNTER — OFFICE VISIT (OUTPATIENT)
Dept: INTERNAL MEDICINE | Facility: CLINIC | Age: 31
End: 2021-06-15
Payer: COMMERCIAL

## 2021-06-15 VITALS
WEIGHT: 215.38 LBS | HEART RATE: 70 BPM | OXYGEN SATURATION: 99 % | HEIGHT: 62 IN | DIASTOLIC BLOOD PRESSURE: 79 MMHG | SYSTOLIC BLOOD PRESSURE: 118 MMHG | BODY MASS INDEX: 39.63 KG/M2

## 2021-06-15 DIAGNOSIS — N92.6 IRREGULAR MENSES: ICD-10-CM

## 2021-06-15 DIAGNOSIS — Z00.00 ROUTINE PHYSICAL EXAMINATION: ICD-10-CM

## 2021-06-15 DIAGNOSIS — Z00.00 ROUTINE PHYSICAL EXAMINATION: Primary | ICD-10-CM

## 2021-06-15 DIAGNOSIS — D64.9 ANEMIA, UNSPECIFIED TYPE: ICD-10-CM

## 2021-06-15 LAB
BASOPHILS # BLD AUTO: 0.04 K/UL (ref 0–0.2)
BASOPHILS NFR BLD: 0.5 % (ref 0–1.9)
CHOLEST SERPL-MCNC: 129 MG/DL (ref 120–199)
CHOLEST/HDLC SERPL: 2.9 {RATIO} (ref 2–5)
DIFFERENTIAL METHOD: ABNORMAL
EOSINOPHIL # BLD AUTO: 0.1 K/UL (ref 0–0.5)
EOSINOPHIL NFR BLD: 0.7 % (ref 0–8)
ERYTHROCYTE [DISTWIDTH] IN BLOOD BY AUTOMATED COUNT: 16.2 % (ref 11.5–14.5)
ESTIMATED AVG GLUCOSE: 111 MG/DL (ref 68–131)
HBA1C MFR BLD: 5.5 % (ref 4–5.6)
HCT VFR BLD AUTO: 35.4 % (ref 37–48.5)
HDLC SERPL-MCNC: 45 MG/DL (ref 40–75)
HDLC SERPL: 34.9 % (ref 20–50)
HGB BLD-MCNC: 10.8 G/DL (ref 12–16)
IMM GRANULOCYTES # BLD AUTO: 0.02 K/UL (ref 0–0.04)
IMM GRANULOCYTES NFR BLD AUTO: 0.3 % (ref 0–0.5)
LDLC SERPL CALC-MCNC: 71 MG/DL (ref 63–159)
LYMPHOCYTES # BLD AUTO: 1.8 K/UL (ref 1–4.8)
LYMPHOCYTES NFR BLD: 24.1 % (ref 18–48)
MCH RBC QN AUTO: 25.9 PG (ref 27–31)
MCHC RBC AUTO-ENTMCNC: 30.5 G/DL (ref 32–36)
MCV RBC AUTO: 85 FL (ref 82–98)
MONOCYTES # BLD AUTO: 0.5 K/UL (ref 0.3–1)
MONOCYTES NFR BLD: 7 % (ref 4–15)
NEUTROPHILS # BLD AUTO: 5 K/UL (ref 1.8–7.7)
NEUTROPHILS NFR BLD: 67.4 % (ref 38–73)
NONHDLC SERPL-MCNC: 84 MG/DL
NRBC BLD-RTO: 0 /100 WBC
PLATELET # BLD AUTO: 304 K/UL (ref 150–450)
PMV BLD AUTO: 10.8 FL (ref 9.2–12.9)
RBC # BLD AUTO: 4.17 M/UL (ref 4–5.4)
TRIGL SERPL-MCNC: 65 MG/DL (ref 30–150)
TSH SERPL DL<=0.005 MIU/L-ACNC: 1.18 UIU/ML (ref 0.4–4)
WBC # BLD AUTO: 7.38 K/UL (ref 3.9–12.7)

## 2021-06-15 PROCEDURE — 3008F PR BODY MASS INDEX (BMI) DOCUMENTED: ICD-10-PCS | Mod: CPTII,S$GLB,, | Performed by: INTERNAL MEDICINE

## 2021-06-15 PROCEDURE — 99999 PR PBB SHADOW E&M-EST. PATIENT-LVL III: ICD-10-PCS | Mod: PBBFAC,,, | Performed by: INTERNAL MEDICINE

## 2021-06-15 PROCEDURE — 83036 HEMOGLOBIN GLYCOSYLATED A1C: CPT | Performed by: INTERNAL MEDICINE

## 2021-06-15 PROCEDURE — 99395 PREV VISIT EST AGE 18-39: CPT | Mod: S$GLB,,, | Performed by: INTERNAL MEDICINE

## 2021-06-15 PROCEDURE — 84443 ASSAY THYROID STIM HORMONE: CPT | Performed by: INTERNAL MEDICINE

## 2021-06-15 PROCEDURE — 85025 COMPLETE CBC W/AUTO DIFF WBC: CPT | Performed by: INTERNAL MEDICINE

## 2021-06-15 PROCEDURE — 3008F BODY MASS INDEX DOCD: CPT | Mod: CPTII,S$GLB,, | Performed by: INTERNAL MEDICINE

## 2021-06-15 PROCEDURE — 1126F AMNT PAIN NOTED NONE PRSNT: CPT | Mod: S$GLB,,, | Performed by: INTERNAL MEDICINE

## 2021-06-15 PROCEDURE — 99999 PR PBB SHADOW E&M-EST. PATIENT-LVL III: CPT | Mod: PBBFAC,,, | Performed by: INTERNAL MEDICINE

## 2021-06-15 PROCEDURE — 80061 LIPID PANEL: CPT | Performed by: INTERNAL MEDICINE

## 2021-06-15 PROCEDURE — 36415 COLL VENOUS BLD VENIPUNCTURE: CPT | Performed by: INTERNAL MEDICINE

## 2021-06-15 PROCEDURE — 99395 PR PREVENTIVE VISIT,EST,18-39: ICD-10-PCS | Mod: S$GLB,,, | Performed by: INTERNAL MEDICINE

## 2021-06-15 PROCEDURE — 1126F PR PAIN SEVERITY QUANTIFIED, NO PAIN PRESENT: ICD-10-PCS | Mod: S$GLB,,, | Performed by: INTERNAL MEDICINE

## 2021-06-28 ENCOUNTER — PATIENT OUTREACH (OUTPATIENT)
Dept: ADMINISTRATIVE | Facility: OTHER | Age: 31
End: 2021-06-28

## 2021-10-15 ENCOUNTER — OFFICE VISIT (OUTPATIENT)
Dept: URGENT CARE | Facility: CLINIC | Age: 31
End: 2021-10-15
Payer: COMMERCIAL

## 2021-10-15 VITALS
HEART RATE: 94 BPM | OXYGEN SATURATION: 98 % | SYSTOLIC BLOOD PRESSURE: 131 MMHG | WEIGHT: 215 LBS | DIASTOLIC BLOOD PRESSURE: 80 MMHG | RESPIRATION RATE: 18 BRPM | HEIGHT: 62 IN | BODY MASS INDEX: 39.56 KG/M2 | TEMPERATURE: 98 F

## 2021-10-15 DIAGNOSIS — N89.8 VAGINAL DISCHARGE: Primary | ICD-10-CM

## 2021-10-15 PROCEDURE — 1159F PR MEDICATION LIST DOCUMENTED IN MEDICAL RECORD: ICD-10-PCS | Mod: CPTII,S$GLB,,

## 2021-10-15 PROCEDURE — 99213 PR OFFICE/OUTPT VISIT, EST, LEVL III, 20-29 MIN: ICD-10-PCS | Mod: S$GLB,,,

## 2021-10-15 PROCEDURE — 3008F PR BODY MASS INDEX (BMI) DOCUMENTED: ICD-10-PCS | Mod: CPTII,S$GLB,,

## 2021-10-15 PROCEDURE — 99213 OFFICE O/P EST LOW 20 MIN: CPT | Mod: S$GLB,,,

## 2021-10-15 PROCEDURE — 3008F BODY MASS INDEX DOCD: CPT | Mod: CPTII,S$GLB,,

## 2021-10-15 PROCEDURE — 3079F PR MOST RECENT DIASTOLIC BLOOD PRESSURE 80-89 MM HG: ICD-10-PCS | Mod: CPTII,S$GLB,,

## 2021-10-15 PROCEDURE — 3079F DIAST BP 80-89 MM HG: CPT | Mod: CPTII,S$GLB,,

## 2021-10-15 PROCEDURE — 3044F PR MOST RECENT HEMOGLOBIN A1C LEVEL <7.0%: ICD-10-PCS | Mod: CPTII,S$GLB,,

## 2021-10-15 PROCEDURE — 87491 CHLMYD TRACH DNA AMP PROBE: CPT

## 2021-10-15 PROCEDURE — 1159F MED LIST DOCD IN RCRD: CPT | Mod: CPTII,S$GLB,,

## 2021-10-15 PROCEDURE — 3044F HG A1C LEVEL LT 7.0%: CPT | Mod: CPTII,S$GLB,,

## 2021-10-15 PROCEDURE — 87481 CANDIDA DNA AMP PROBE: CPT | Mod: 59

## 2021-10-15 PROCEDURE — 1160F PR REVIEW ALL MEDS BY PRESCRIBER/CLIN PHARMACIST DOCUMENTED: ICD-10-PCS | Mod: CPTII,S$GLB,,

## 2021-10-15 PROCEDURE — 1160F RVW MEDS BY RX/DR IN RCRD: CPT | Mod: CPTII,S$GLB,,

## 2021-10-15 PROCEDURE — 87591 N.GONORRHOEAE DNA AMP PROB: CPT

## 2021-10-15 PROCEDURE — 3075F SYST BP GE 130 - 139MM HG: CPT | Mod: CPTII,S$GLB,,

## 2021-10-15 PROCEDURE — 3075F PR MOST RECENT SYSTOLIC BLOOD PRESS GE 130-139MM HG: ICD-10-PCS | Mod: CPTII,S$GLB,,

## 2021-10-18 ENCOUNTER — TELEPHONE (OUTPATIENT)
Dept: URGENT CARE | Facility: CLINIC | Age: 31
End: 2021-10-18

## 2021-10-18 LAB
C TRACH DNA SPEC QL NAA+PROBE: NOT DETECTED
N GONORRHOEA DNA SPEC QL NAA+PROBE: NOT DETECTED

## 2021-10-19 LAB
BACTERIAL VAGINOSIS DNA: POSITIVE
CANDIDA GLABRATA DNA: NEGATIVE
CANDIDA KRUSEI DNA: NEGATIVE
CANDIDA RRNA VAG QL PROBE: NEGATIVE
T VAGINALIS RRNA GENITAL QL PROBE: NEGATIVE

## 2021-10-20 ENCOUNTER — TELEPHONE (OUTPATIENT)
Dept: URGENT CARE | Facility: CLINIC | Age: 31
End: 2021-10-20

## 2021-10-20 DIAGNOSIS — N76.0 BACTERIAL VAGINOSIS: Primary | ICD-10-CM

## 2021-10-20 DIAGNOSIS — B96.89 BACTERIAL VAGINOSIS: Primary | ICD-10-CM

## 2021-10-20 RX ORDER — METRONIDAZOLE 500 MG/1
500 TABLET ORAL EVERY 12 HOURS
Qty: 14 TABLET | Refills: 0 | Status: SHIPPED | OUTPATIENT
Start: 2021-10-20 | End: 2021-10-27

## 2022-06-21 ENCOUNTER — LAB VISIT (OUTPATIENT)
Dept: LAB | Facility: HOSPITAL | Age: 32
End: 2022-06-21
Attending: INTERNAL MEDICINE
Payer: COMMERCIAL

## 2022-06-21 ENCOUNTER — OFFICE VISIT (OUTPATIENT)
Dept: INTERNAL MEDICINE | Facility: CLINIC | Age: 32
End: 2022-06-21
Payer: COMMERCIAL

## 2022-06-21 VITALS
BODY MASS INDEX: 39.27 KG/M2 | WEIGHT: 213.38 LBS | DIASTOLIC BLOOD PRESSURE: 80 MMHG | HEIGHT: 62 IN | HEART RATE: 73 BPM | OXYGEN SATURATION: 99 % | SYSTOLIC BLOOD PRESSURE: 120 MMHG

## 2022-06-21 DIAGNOSIS — F32.A DEPRESSION, UNSPECIFIED DEPRESSION TYPE: ICD-10-CM

## 2022-06-21 DIAGNOSIS — D64.9 ANEMIA, UNSPECIFIED TYPE: ICD-10-CM

## 2022-06-21 DIAGNOSIS — J45.909 ASTHMA, UNSPECIFIED ASTHMA SEVERITY, UNSPECIFIED WHETHER COMPLICATED, UNSPECIFIED WHETHER PERSISTENT: ICD-10-CM

## 2022-06-21 DIAGNOSIS — Z00.00 ROUTINE PHYSICAL EXAMINATION: ICD-10-CM

## 2022-06-21 DIAGNOSIS — Z00.00 ROUTINE PHYSICAL EXAMINATION: Primary | ICD-10-CM

## 2022-06-21 DIAGNOSIS — J40 BRONCHITIS: ICD-10-CM

## 2022-06-21 DIAGNOSIS — F41.9 ANXIETY: ICD-10-CM

## 2022-06-21 LAB
ANION GAP SERPL CALC-SCNC: 8 MMOL/L (ref 8–16)
BASOPHILS # BLD AUTO: 0.04 K/UL (ref 0–0.2)
BASOPHILS NFR BLD: 0.7 % (ref 0–1.9)
BUN SERPL-MCNC: 9 MG/DL (ref 6–20)
CALCIUM SERPL-MCNC: 9.8 MG/DL (ref 8.7–10.5)
CHLORIDE SERPL-SCNC: 105 MMOL/L (ref 95–110)
CO2 SERPL-SCNC: 27 MMOL/L (ref 23–29)
CREAT SERPL-MCNC: 0.7 MG/DL (ref 0.5–1.4)
DIFFERENTIAL METHOD: ABNORMAL
EOSINOPHIL # BLD AUTO: 0.1 K/UL (ref 0–0.5)
EOSINOPHIL NFR BLD: 1.2 % (ref 0–8)
ERYTHROCYTE [DISTWIDTH] IN BLOOD BY AUTOMATED COUNT: 16 % (ref 11.5–14.5)
EST. GFR  (AFRICAN AMERICAN): >60 ML/MIN/1.73 M^2
EST. GFR  (NON AFRICAN AMERICAN): >60 ML/MIN/1.73 M^2
ESTIMATED AVG GLUCOSE: 114 MG/DL (ref 68–131)
GLUCOSE SERPL-MCNC: 78 MG/DL (ref 70–110)
HBA1C MFR BLD: 5.6 % (ref 4–5.6)
HCT VFR BLD AUTO: 32.1 % (ref 37–48.5)
HGB BLD-MCNC: 9.4 G/DL (ref 12–16)
IMM GRANULOCYTES # BLD AUTO: 0.02 K/UL (ref 0–0.04)
IMM GRANULOCYTES NFR BLD AUTO: 0.4 % (ref 0–0.5)
IRON SERPL-MCNC: 32 UG/DL (ref 30–160)
LYMPHOCYTES # BLD AUTO: 1.9 K/UL (ref 1–4.8)
LYMPHOCYTES NFR BLD: 32.6 % (ref 18–48)
MCH RBC QN AUTO: 23.7 PG (ref 27–31)
MCHC RBC AUTO-ENTMCNC: 29.3 G/DL (ref 32–36)
MCV RBC AUTO: 81 FL (ref 82–98)
MONOCYTES # BLD AUTO: 0.4 K/UL (ref 0.3–1)
MONOCYTES NFR BLD: 7.6 % (ref 4–15)
NEUTROPHILS # BLD AUTO: 3.3 K/UL (ref 1.8–7.7)
NEUTROPHILS NFR BLD: 57.5 % (ref 38–73)
NRBC BLD-RTO: 0 /100 WBC
PLATELET # BLD AUTO: 356 K/UL (ref 150–450)
PMV BLD AUTO: 11.2 FL (ref 9.2–12.9)
POTASSIUM SERPL-SCNC: 4 MMOL/L (ref 3.5–5.1)
RBC # BLD AUTO: 3.96 M/UL (ref 4–5.4)
SATURATED IRON: 7 % (ref 20–50)
SODIUM SERPL-SCNC: 140 MMOL/L (ref 136–145)
TOTAL IRON BINDING CAPACITY: 472 UG/DL (ref 250–450)
TRANSFERRIN SERPL-MCNC: 319 MG/DL (ref 200–375)
TSH SERPL DL<=0.005 MIU/L-ACNC: 0.9 UIU/ML (ref 0.4–4)
WBC # BLD AUTO: 5.67 K/UL (ref 3.9–12.7)

## 2022-06-21 PROCEDURE — 84443 ASSAY THYROID STIM HORMONE: CPT | Performed by: INTERNAL MEDICINE

## 2022-06-21 PROCEDURE — 1159F PR MEDICATION LIST DOCUMENTED IN MEDICAL RECORD: ICD-10-PCS | Mod: CPTII,S$GLB,, | Performed by: INTERNAL MEDICINE

## 2022-06-21 PROCEDURE — 3008F BODY MASS INDEX DOCD: CPT | Mod: CPTII,S$GLB,, | Performed by: INTERNAL MEDICINE

## 2022-06-21 PROCEDURE — 85025 COMPLETE CBC W/AUTO DIFF WBC: CPT | Performed by: INTERNAL MEDICINE

## 2022-06-21 PROCEDURE — 99395 PR PREVENTIVE VISIT,EST,18-39: ICD-10-PCS | Mod: S$GLB,,, | Performed by: INTERNAL MEDICINE

## 2022-06-21 PROCEDURE — 3008F PR BODY MASS INDEX (BMI) DOCUMENTED: ICD-10-PCS | Mod: CPTII,S$GLB,, | Performed by: INTERNAL MEDICINE

## 2022-06-21 PROCEDURE — 3074F PR MOST RECENT SYSTOLIC BLOOD PRESSURE < 130 MM HG: ICD-10-PCS | Mod: CPTII,S$GLB,, | Performed by: INTERNAL MEDICINE

## 2022-06-21 PROCEDURE — 3079F PR MOST RECENT DIASTOLIC BLOOD PRESSURE 80-89 MM HG: ICD-10-PCS | Mod: CPTII,S$GLB,, | Performed by: INTERNAL MEDICINE

## 2022-06-21 PROCEDURE — 3074F SYST BP LT 130 MM HG: CPT | Mod: CPTII,S$GLB,, | Performed by: INTERNAL MEDICINE

## 2022-06-21 PROCEDURE — 99395 PREV VISIT EST AGE 18-39: CPT | Mod: S$GLB,,, | Performed by: INTERNAL MEDICINE

## 2022-06-21 PROCEDURE — 1159F MED LIST DOCD IN RCRD: CPT | Mod: CPTII,S$GLB,, | Performed by: INTERNAL MEDICINE

## 2022-06-21 PROCEDURE — 84466 ASSAY OF TRANSFERRIN: CPT | Performed by: INTERNAL MEDICINE

## 2022-06-21 PROCEDURE — 3079F DIAST BP 80-89 MM HG: CPT | Mod: CPTII,S$GLB,, | Performed by: INTERNAL MEDICINE

## 2022-06-21 PROCEDURE — 36415 COLL VENOUS BLD VENIPUNCTURE: CPT | Performed by: INTERNAL MEDICINE

## 2022-06-21 PROCEDURE — 83036 HEMOGLOBIN GLYCOSYLATED A1C: CPT | Performed by: INTERNAL MEDICINE

## 2022-06-21 PROCEDURE — 99999 PR PBB SHADOW E&M-EST. PATIENT-LVL IV: ICD-10-PCS | Mod: PBBFAC,,, | Performed by: INTERNAL MEDICINE

## 2022-06-21 PROCEDURE — 99999 PR PBB SHADOW E&M-EST. PATIENT-LVL IV: CPT | Mod: PBBFAC,,, | Performed by: INTERNAL MEDICINE

## 2022-06-21 PROCEDURE — 80048 BASIC METABOLIC PNL TOTAL CA: CPT | Performed by: INTERNAL MEDICINE

## 2022-06-21 RX ORDER — ALBUTEROL SULFATE 90 UG/1
1-2 AEROSOL, METERED RESPIRATORY (INHALATION) EVERY 6 HOURS PRN
Qty: 8 G | Refills: 6 | Status: SHIPPED | OUTPATIENT
Start: 2022-06-21 | End: 2023-06-21

## 2022-06-21 RX ORDER — FERROUS SULFATE 325(65) MG
325 TABLET ORAL DAILY
Qty: 30 TABLET | Refills: 4 | Status: SHIPPED | OUTPATIENT
Start: 2022-06-21

## 2022-06-21 NOTE — PROGRESS NOTES
Subjective:       Patient ID: Siobhan Chappell is a 32 y.o. female.    Chief Complaint: Annual Exam    32-year-old extremely pleasant female comes in for annual exam.  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  She also has a 14-year-old child with autism and 4-year-old.  She still has relationship with their father but says lately she has difficulty with no desire for intimacy.  She says that is placing a bit of a stress on the relationship.  Issue would like to speak with a counselor or therapist to help with stress anxiety depression in general.  She did have a positive depression screen.  She has not needed her inhaler lately.  Labs last year were stable but we would like to update those.  She also would like a refill on her iron  She will speak to gyn about a different form of birth control    Review of Systems   Constitutional: Positive for activity change. Negative for unexpected weight change.   HENT: Negative for hearing loss, rhinorrhea and trouble swallowing.    Eyes: Positive for visual disturbance (Saw eye doctor). Negative for discharge.   Respiratory: Negative for chest tightness and wheezing.    Gastrointestinal: Negative for blood in stool, constipation, diarrhea and vomiting.   Endocrine: Negative for polydipsia and polyuria.   Genitourinary: Negative for difficulty urinating, dysuria, hematuria and menstrual problem.   Musculoskeletal: Negative for arthralgias, joint swelling and neck pain.   Neurological: Negative for weakness and headaches.   Psychiatric/Behavioral: Positive for dysphoric mood. Negative for confusion. The patient is nervous/anxious.            Past Medical History:   Diagnosis Date    Asthma      Past Surgical History:   Procedure Laterality Date     SECTION        Patient Active Problem List   Diagnosis    History of     Asthma        Objective:      Physical Exam  Constitutional:       General:  She is not in acute distress.     Appearance: She is well-developed. She is obese.   HENT:      Head: Normocephalic and atraumatic.      Right Ear: Tympanic membrane, ear canal and external ear normal.      Left Ear: Tympanic membrane, ear canal and external ear normal.      Mouth/Throat:      Pharynx: No oropharyngeal exudate or posterior oropharyngeal erythema.   Eyes:      General: No scleral icterus.     Conjunctiva/sclera: Conjunctivae normal.      Pupils: Pupils are equal, round, and reactive to light.   Neck:      Thyroid: No thyromegaly.   Cardiovascular:      Rate and Rhythm: Normal rate and regular rhythm.      Pulses: Normal pulses.      Heart sounds: No murmur heard.  Pulmonary:      Effort: Pulmonary effort is normal.      Breath sounds: Normal breath sounds. No wheezing.   Abdominal:      General: Bowel sounds are normal. There is no distension.      Palpations: Abdomen is soft.      Tenderness: There is no abdominal tenderness.   Musculoskeletal:         General: No tenderness.      Cervical back: Normal range of motion and neck supple.      Right lower leg: No edema.      Left lower leg: No edema.   Lymphadenopathy:      Cervical: No cervical adenopathy.   Skin:     Coloration: Skin is not jaundiced.      Findings: No rash.   Neurological:      General: No focal deficit present.      Mental Status: She is alert and oriented to person, place, and time.   Psychiatric:         Behavior: Behavior normal.         Thought Content: Thought content normal.      Comments: Admits mild depression, lack of interest in intimacy, no suicidal ideation         Assessment:       Problem List Items Addressed This Visit        Pulmonary    Asthma    Relevant Orders    Basic Metabolic Panel    CBC Auto Differential    Iron and TIBC    Hemoglobin A1C    TSH      Other Visit Diagnoses     Routine physical examination    -  Primary    Relevant Orders    Basic Metabolic Panel    CBC Auto Differential    Iron and TIBC     Hemoglobin A1C    TSH    Anxiety        Relevant Orders    Ambulatory referral/consult to Primary Care Behavioral Health (Non-Opioids)    Basic Metabolic Panel    CBC Auto Differential    Iron and TIBC    Hemoglobin A1C    TSH    Depression, unspecified depression type        Relevant Orders    Ambulatory referral/consult to Primary Care Behavioral Health (Non-Opioids)    Basic Metabolic Panel    CBC Auto Differential    Iron and TIBC    Hemoglobin A1C    TSH    Bronchitis        Finished all antibiotics associated with the prior diagnosis    Relevant Medications    albuterol (PROVENTIL/VENTOLIN HFA) 90 mcg/actuation inhaler    Other Relevant Orders    Basic Metabolic Panel    CBC Auto Differential    Iron and TIBC    Hemoglobin A1C    TSH    Anemia, unspecified type        Relevant Orders    Basic Metabolic Panel    CBC Auto Differential    Iron and TIBC    Hemoglobin A1C    TSH          Plan:         Siobhan was seen today for annual exam.    Diagnoses and all orders for this visit:    Routine physical examination  -     Basic Metabolic Panel; Future  -     CBC Auto Differential; Future  -     Iron and TIBC; Future  -     Hemoglobin A1C; Future  -     TSH; Future    Anxiety  -     Ambulatory referral/consult to Primary Care Behavioral Health (Non-Opioids); Future  -     Basic Metabolic Panel; Future  -     CBC Auto Differential; Future  -     Iron and TIBC; Future  -     Hemoglobin A1C; Future  -     TSH; Future    Depression, unspecified depression type  -     Ambulatory referral/consult to Primary Care Behavioral Health (Non-Opioids); Future  -     Basic Metabolic Panel; Future  -     CBC Auto Differential; Future  -     Iron and TIBC; Future  -     Hemoglobin A1C; Future  -     TSH; Future    Asthma, unspecified asthma severity, unspecified whether complicated, unspecified whether persistent  -     Basic Metabolic Panel; Future  -     CBC Auto Differential; Future  -     Iron and TIBC; Future  -     Hemoglobin  "A1C; Future  -     TSH; Future    Bronchitis  Comments:  Finished all antibiotics associated with the prior diagnosis  Orders:  -     albuterol (PROVENTIL/VENTOLIN HFA) 90 mcg/actuation inhaler; Inhale 1-2 puffs into the lungs every 6 (six) hours as needed for Wheezing. Rescue  -     Basic Metabolic Panel; Future  -     CBC Auto Differential; Future  -     Iron and TIBC; Future  -     Hemoglobin A1C; Future  -     TSH; Future    Anemia, unspecified type  -     Basic Metabolic Panel; Future  -     CBC Auto Differential; Future  -     Iron and TIBC; Future  -     Hemoglobin A1C; Future  -     TSH; Future    Other orders  -     ferrous sulfate (FEOSOL) 325 mg (65 mg iron) Tab tablet; Take 1 tablet (325 mg total) by mouth once daily.               Patient will notify me if she has any significant change in her mood.  Discussed reaching out for help if she has any change in suicidal ideation.  Assist with behavioral health program  Follow-up depending on results and in 1 year      Portions of this note may have been created with voice recognition software. Occasional "wrong-word" or "sound-a-like" substitutions may have occurred due to the inherent limitations of voice recognition software. Please, read the note carefully and recognize, using context, where substitutions have occurred.  "

## 2022-06-27 ENCOUNTER — PATIENT MESSAGE (OUTPATIENT)
Dept: INTERNAL MEDICINE | Facility: CLINIC | Age: 32
End: 2022-06-27
Payer: COMMERCIAL

## 2022-06-30 ENCOUNTER — PATIENT MESSAGE (OUTPATIENT)
Dept: BEHAVIORAL HEALTH | Facility: CLINIC | Age: 32
End: 2022-06-30
Payer: COMMERCIAL

## 2022-06-30 ENCOUNTER — TELEPHONE (OUTPATIENT)
Dept: BEHAVIORAL HEALTH | Facility: CLINIC | Age: 32
End: 2022-06-30
Payer: COMMERCIAL

## 2022-07-05 ENCOUNTER — OFFICE VISIT (OUTPATIENT)
Dept: URGENT CARE | Facility: CLINIC | Age: 32
End: 2022-07-05
Payer: COMMERCIAL

## 2022-07-05 VITALS
HEART RATE: 92 BPM | TEMPERATURE: 99 F | DIASTOLIC BLOOD PRESSURE: 89 MMHG | OXYGEN SATURATION: 97 % | SYSTOLIC BLOOD PRESSURE: 128 MMHG | WEIGHT: 213 LBS | HEIGHT: 62 IN | BODY MASS INDEX: 39.2 KG/M2 | RESPIRATION RATE: 18 BRPM

## 2022-07-05 DIAGNOSIS — J06.9 URI, ACUTE: ICD-10-CM

## 2022-07-05 DIAGNOSIS — Z11.52 ENCOUNTER FOR SCREENING LABORATORY TESTING FOR COVID-19 VIRUS: Primary | ICD-10-CM

## 2022-07-05 LAB
CTP QC/QA: YES
SARS-COV-2 RDRP RESP QL NAA+PROBE: NEGATIVE

## 2022-07-05 PROCEDURE — U0002: ICD-10-PCS | Mod: QW,S$GLB,, | Performed by: FAMILY MEDICINE

## 2022-07-05 PROCEDURE — 3008F PR BODY MASS INDEX (BMI) DOCUMENTED: ICD-10-PCS | Mod: CPTII,S$GLB,, | Performed by: FAMILY MEDICINE

## 2022-07-05 PROCEDURE — 3079F DIAST BP 80-89 MM HG: CPT | Mod: CPTII,S$GLB,, | Performed by: FAMILY MEDICINE

## 2022-07-05 PROCEDURE — 3044F HG A1C LEVEL LT 7.0%: CPT | Mod: CPTII,S$GLB,, | Performed by: FAMILY MEDICINE

## 2022-07-05 PROCEDURE — U0002 COVID-19 LAB TEST NON-CDC: HCPCS | Mod: QW,S$GLB,, | Performed by: FAMILY MEDICINE

## 2022-07-05 PROCEDURE — 99213 PR OFFICE/OUTPT VISIT, EST, LEVL III, 20-29 MIN: ICD-10-PCS | Mod: S$GLB,,, | Performed by: FAMILY MEDICINE

## 2022-07-05 PROCEDURE — 3044F PR MOST RECENT HEMOGLOBIN A1C LEVEL <7.0%: ICD-10-PCS | Mod: CPTII,S$GLB,, | Performed by: FAMILY MEDICINE

## 2022-07-05 PROCEDURE — 99213 OFFICE O/P EST LOW 20 MIN: CPT | Mod: S$GLB,,, | Performed by: FAMILY MEDICINE

## 2022-07-05 PROCEDURE — 3079F PR MOST RECENT DIASTOLIC BLOOD PRESSURE 80-89 MM HG: ICD-10-PCS | Mod: CPTII,S$GLB,, | Performed by: FAMILY MEDICINE

## 2022-07-05 PROCEDURE — 3074F SYST BP LT 130 MM HG: CPT | Mod: CPTII,S$GLB,, | Performed by: FAMILY MEDICINE

## 2022-07-05 PROCEDURE — 1159F MED LIST DOCD IN RCRD: CPT | Mod: CPTII,S$GLB,, | Performed by: FAMILY MEDICINE

## 2022-07-05 PROCEDURE — 3074F PR MOST RECENT SYSTOLIC BLOOD PRESSURE < 130 MM HG: ICD-10-PCS | Mod: CPTII,S$GLB,, | Performed by: FAMILY MEDICINE

## 2022-07-05 PROCEDURE — 1159F PR MEDICATION LIST DOCUMENTED IN MEDICAL RECORD: ICD-10-PCS | Mod: CPTII,S$GLB,, | Performed by: FAMILY MEDICINE

## 2022-07-05 PROCEDURE — 3008F BODY MASS INDEX DOCD: CPT | Mod: CPTII,S$GLB,, | Performed by: FAMILY MEDICINE

## 2022-07-05 NOTE — PROGRESS NOTES
"Subjective:       Patient ID: Siobhan Chappell is a 32 y.o. female.    Vitals:  height is 5' 2" (1.575 m) and weight is 96.6 kg (213 lb). Her oral temperature is 98.7 °F (37.1 °C). Her blood pressure is 128/89 and her pulse is 92. Her respiration is 18 and oxygen saturation is 97%.     Chief Complaint: Headache    C/o sore throat sinus congestion and headache since yesterday, denies f/c  Vaccinated      Headache   This is a new problem. The current episode started yesterday. The problem occurs constantly. The problem has been unchanged. The pain is located in the frontal region. The pain quality is not similar to prior headaches. The quality of the pain is described as dull. The pain is at a severity of 6/10. The pain is moderate. Associated symptoms include abdominal pain and coughing. Pertinent negatives include no back pain, dizziness, ear pain, eye pain, eye watering, fever, muscle aches, nausea, sinus pressure, sore throat or vomiting. Nothing aggravates the symptoms. She has tried acetaminophen for the symptoms. The treatment provided mild relief. There is no history of cancer, cluster headaches, hypertension or migraine headaches.       Constitution: Negative for fever.   HENT: Negative for ear pain, sinus pressure and sore throat.    Eyes: Negative for eye pain.   Respiratory: Positive for cough.    Gastrointestinal: Positive for abdominal pain. Negative for nausea and vomiting.   Musculoskeletal: Negative for back pain.   Neurological: Positive for headaches. Negative for dizziness and history of migraines.       Objective:      Physical Exam   Constitutional: She is oriented to person, place, and time. She appears well-developed. She is cooperative.  Non-toxic appearance. She does not appear ill. No distress.   HENT:   Head: Normocephalic and atraumatic.   Ears:   Right Ear: Hearing, tympanic membrane, external ear and ear canal normal.   Left Ear: Hearing, tympanic membrane, external ear and ear " canal normal.   Nose: Rhinorrhea present. No mucosal edema, nasal deformity or congestion. No epistaxis. Right sinus exhibits no maxillary sinus tenderness and no frontal sinus tenderness. Left sinus exhibits no maxillary sinus tenderness and no frontal sinus tenderness.   Mouth/Throat: Uvula is midline, oropharynx is clear and moist and mucous membranes are normal. Mucous membranes are moist. No trismus in the jaw. Normal dentition. No uvula swelling. Oropharynx is clear.   Eyes: Conjunctivae and lids are normal. Pupils are equal, round, and reactive to light. Right eye exhibits no discharge. Left eye exhibits no discharge. No scleral icterus. Extraocular movement intact   Neck: Trachea normal and phonation normal. Neck supple.   Cardiovascular: Normal rate, regular rhythm, normal heart sounds and normal pulses.   Pulmonary/Chest: Effort normal and breath sounds normal. No respiratory distress.   Abdominal: Normal appearance and bowel sounds are normal. She exhibits no distension, no pulsatile midline mass and no mass. Soft. There is no abdominal tenderness.   Musculoskeletal: Normal range of motion.         General: No deformity. Normal range of motion.   Neurological: She is alert and oriented to person, place, and time. She exhibits normal muscle tone. Coordination normal.   Skin: Skin is warm, dry, intact, not diaphoretic and not pale.   Psychiatric: Her speech is normal and behavior is normal. Judgment and thought content normal.   Nursing note and vitals reviewed.        Assessment:       1. Encounter for screening laboratory testing for COVID-19 virus    2. URI, acute          Plan:         Encounter for screening laboratory testing for COVID-19 virus  -     POCT COVID-19 Rapid Screening    URI, acute             Results for orders placed or performed in visit on 07/05/22   POCT COVID-19 Rapid Screening   Result Value Ref Range    POC Rapid COVID Negative Negative     Acceptable Yes

## 2022-07-05 NOTE — LETTER
July 5, 2022      [facility]  [address] [address]  Phone: [phone]  Fax: [phone]       Patient: Siobhan Chappell   YOB: 1990  Date of Visit: 07/05/2022    To Whom It May Concern:    Brenda Chappell  was at [facility] on 07/05/2022. The patient may return to work/school on 7/6/22   with no restrictions. If you have any questions or concerns, or if I can be of further assistance, please do not hesitate to contact me.    Sincerely,    Harriet Casillas MD

## 2022-07-06 NOTE — PATIENT INSTRUCTIONS
Please drink plenty of fluids.  Please get plenty of rest.  Please return here or go to the Emergency Department for any concerns or worsening of condition.  If you were given wait & see antibiotics, please wait 3-5 days before taking them, and only take them if your symptoms have worsened or not improved.  If you do begin taking the antibiotics, please take them to completion.  If you were prescribed antibiotics, please take them to completion.  If you were prescribed a narcotic medication, do not drive or operate heavy equipment or machinery while taking these medications.  If you do not have Hypertension or any history of palpitations, it is ok to take over the counter Sudafed or Mucinex D or Allegra-D or Claritin-D or Zyrtec-D.  If you do take one of the above, it is ok to combine that with plain over the counter Mucinex or Allegra or Claritin or Zyrtec.  If for example you are taking Zyrtec -D, you can combine that with Mucinex, but not Mucinex-D.  If you are taking Mucinex-D, you can combine that with plain Allegra or Claritin or Zyrtec.   If you do have Hypertension or palpitations, it is safe to take Coricidin HBP for relief of sinus symptoms.  We recommend you take over the counter Flonase (Fluticasone) or another nasally inhaled steroid unless you are already taking one.  Nasal irrigation with a saline spray or Netti Pot like device per their directions is also recommended.  If not allergic, please take over the counter Tylenol (Acetaminophen) and/or Motrin (Ibuprofen) as directed for control of pain and/or fever.  Please follow up with your primary care doctor or specialist as needed.    If you  smoke, please stop smoking.     IV intact/Arm band on

## 2022-07-11 ENCOUNTER — TELEPHONE (OUTPATIENT)
Dept: BEHAVIORAL HEALTH | Facility: CLINIC | Age: 32
End: 2022-07-11
Payer: COMMERCIAL

## 2022-07-18 ENCOUNTER — TELEPHONE (OUTPATIENT)
Dept: BEHAVIORAL HEALTH | Facility: CLINIC | Age: 32
End: 2022-07-18
Payer: COMMERCIAL

## 2022-08-08 ENCOUNTER — TELEPHONE (OUTPATIENT)
Dept: BEHAVIORAL HEALTH | Facility: CLINIC | Age: 32
End: 2022-08-08
Payer: COMMERCIAL

## 2022-08-08 NOTE — PROGRESS NOTES
Patient was referred to the [facility] Non Opioid program by PCP.  CHW tired to reach out to patient a total of three times.  Patient referral was removed from the [facility] program.  CHW sent follow up message to patient's PCP via [employer].

## 2022-09-19 ENCOUNTER — OFFICE VISIT (OUTPATIENT)
Dept: URGENT CARE | Facility: CLINIC | Age: 32
End: 2022-09-19
Payer: COMMERCIAL

## 2022-09-19 VITALS
DIASTOLIC BLOOD PRESSURE: 75 MMHG | WEIGHT: 213 LBS | RESPIRATION RATE: 18 BRPM | BODY MASS INDEX: 39.2 KG/M2 | HEIGHT: 62 IN | OXYGEN SATURATION: 98 % | HEART RATE: 85 BPM | SYSTOLIC BLOOD PRESSURE: 115 MMHG | TEMPERATURE: 98 F

## 2022-09-19 DIAGNOSIS — J06.9 UPPER RESPIRATORY VIRUS: Primary | ICD-10-CM

## 2022-09-19 LAB
CTP QC/QA: YES
SARS-COV-2 RDRP RESP QL NAA+PROBE: NEGATIVE

## 2022-09-19 PROCEDURE — U0002: ICD-10-PCS | Mod: QW,S$GLB,, | Performed by: NURSE PRACTITIONER

## 2022-09-19 PROCEDURE — 3044F PR MOST RECENT HEMOGLOBIN A1C LEVEL <7.0%: ICD-10-PCS | Mod: CPTII,S$GLB,, | Performed by: NURSE PRACTITIONER

## 2022-09-19 PROCEDURE — U0002 COVID-19 LAB TEST NON-CDC: HCPCS | Mod: QW,S$GLB,, | Performed by: NURSE PRACTITIONER

## 2022-09-19 PROCEDURE — 3078F DIAST BP <80 MM HG: CPT | Mod: CPTII,S$GLB,, | Performed by: NURSE PRACTITIONER

## 2022-09-19 PROCEDURE — 99213 PR OFFICE/OUTPT VISIT, EST, LEVL III, 20-29 MIN: ICD-10-PCS | Mod: S$GLB,,, | Performed by: NURSE PRACTITIONER

## 2022-09-19 PROCEDURE — 3078F PR MOST RECENT DIASTOLIC BLOOD PRESSURE < 80 MM HG: ICD-10-PCS | Mod: CPTII,S$GLB,, | Performed by: NURSE PRACTITIONER

## 2022-09-19 PROCEDURE — 3008F PR BODY MASS INDEX (BMI) DOCUMENTED: ICD-10-PCS | Mod: CPTII,S$GLB,, | Performed by: NURSE PRACTITIONER

## 2022-09-19 PROCEDURE — 1159F PR MEDICATION LIST DOCUMENTED IN MEDICAL RECORD: ICD-10-PCS | Mod: CPTII,S$GLB,, | Performed by: NURSE PRACTITIONER

## 2022-09-19 PROCEDURE — 1160F RVW MEDS BY RX/DR IN RCRD: CPT | Mod: CPTII,S$GLB,, | Performed by: NURSE PRACTITIONER

## 2022-09-19 PROCEDURE — 1159F MED LIST DOCD IN RCRD: CPT | Mod: CPTII,S$GLB,, | Performed by: NURSE PRACTITIONER

## 2022-09-19 PROCEDURE — 3008F BODY MASS INDEX DOCD: CPT | Mod: CPTII,S$GLB,, | Performed by: NURSE PRACTITIONER

## 2022-09-19 PROCEDURE — 99213 OFFICE O/P EST LOW 20 MIN: CPT | Mod: S$GLB,,, | Performed by: NURSE PRACTITIONER

## 2022-09-19 PROCEDURE — 3074F SYST BP LT 130 MM HG: CPT | Mod: CPTII,S$GLB,, | Performed by: NURSE PRACTITIONER

## 2022-09-19 PROCEDURE — 3074F PR MOST RECENT SYSTOLIC BLOOD PRESSURE < 130 MM HG: ICD-10-PCS | Mod: CPTII,S$GLB,, | Performed by: NURSE PRACTITIONER

## 2022-09-19 PROCEDURE — 3044F HG A1C LEVEL LT 7.0%: CPT | Mod: CPTII,S$GLB,, | Performed by: NURSE PRACTITIONER

## 2022-09-19 PROCEDURE — 1160F PR REVIEW ALL MEDS BY PRESCRIBER/CLIN PHARMACIST DOCUMENTED: ICD-10-PCS | Mod: CPTII,S$GLB,, | Performed by: NURSE PRACTITIONER

## 2022-09-19 RX ORDER — AZELASTINE 1 MG/ML
1 SPRAY, METERED NASAL 2 TIMES DAILY PRN
Qty: 30 ML | Refills: 0 | Status: SHIPPED | OUTPATIENT
Start: 2022-09-19

## 2022-09-19 NOTE — PROGRESS NOTES
"Subjective:       Patient ID: Siobhan Chappell is a 32 y.o. female.    Vitals:  height is 5' 2" (1.575 m) and weight is 96.6 kg (213 lb). Her temperature is 98.1 °F (36.7 °C). Her blood pressure is 115/75 and her pulse is 85. Her respiration is 18 and oxygen saturation is 98%.     Chief Complaint: Sore Throat    This is a 32 y.o. female who presents today with a chief complaint of  cough, headache, sore throat and runny nose since Friday, denies fever, body aches or chills, denies  wheezing or shortness of breath, denies nausea, vomiting, diarrhea or abdominal pain, denies chest pain or dizziness positional lightheadedness, denies trouble swallowing, denies loss of taste or smell, or any other symptoms        Sore Throat   This is a new problem. The current episode started in the past 7 days. The problem has been waxing and waning. There has been no fever. The pain is at a severity of 6/10. The pain is moderate. Associated symptoms include congestion, coughing, headaches and a plugged ear sensation. Pertinent negatives include no hoarse voice, swollen glands or trouble swallowing. Treatments tried: mucinex, advil. The treatment provided mild relief.     HENT:  Positive for congestion and sore throat. Negative for trouble swallowing.    Respiratory:  Positive for cough.    Neurological:  Positive for headaches.     Objective:      Physical Exam   Constitutional: She is oriented to person, place, and time. She appears well-developed. She is cooperative.  Non-toxic appearance. She does not appear ill. No distress.   HENT:   Head: Normocephalic and atraumatic.   Ears:   Right Ear: Hearing, tympanic membrane, external ear and ear canal normal.   Left Ear: Hearing, tympanic membrane, external ear and ear canal normal.   Nose: Nose normal. No mucosal edema, rhinorrhea or nasal deformity. No epistaxis. Right sinus exhibits no maxillary sinus tenderness and no frontal sinus tenderness. Left sinus exhibits no maxillary " sinus tenderness and no frontal sinus tenderness.   Mouth/Throat: Uvula is midline, oropharynx is clear and moist and mucous membranes are normal. No trismus in the jaw. Normal dentition. No uvula swelling. No oropharyngeal exudate, posterior oropharyngeal edema, posterior oropharyngeal erythema, tonsillar abscesses or cobblestoning.   Eyes: Conjunctivae and lids are normal. No scleral icterus. Extraocular movement intact   Neck: Trachea normal and phonation normal. Neck supple. No edema present. No erythema present. No neck rigidity present.   Cardiovascular: Normal rate, regular rhythm, normal heart sounds and normal pulses.   Pulmonary/Chest: Effort normal and breath sounds normal. No respiratory distress. She has no decreased breath sounds. She has no rhonchi.   Abdominal: Normal appearance.   Musculoskeletal: Normal range of motion.         General: No deformity. Normal range of motion.   Lymphadenopathy:     She has no cervical adenopathy.   Neurological: no focal deficit. She is alert and oriented to person, place, and time. She exhibits normal muscle tone. Coordination normal.   Skin: Skin is warm, dry, intact, not diaphoretic and not pale.   Psychiatric: Her speech is normal and behavior is normal. Judgment and thought content normal.   Nursing note and vitals reviewed.      Results for orders placed or performed in visit on 09/19/22   POCT COVID-19 Rapid Screening   Result Value Ref Range    POC Rapid COVID Negative Negative     Acceptable Yes          Patient in no acute distress.  Vitals reassuring.  Negative covid 19 results discussed with patient in detail. Detailed education provided about covid 19 precaution and recommendations as per cdc guidelines.Discussed results/diagnosis/plan in depth with patient in clinic. Strict precautions given to patient to monitor for worsening signs and symptoms. Advised to follow up with primary.All questions answered. Strict ER precautions given. If  your symptoms worsens or fail to improve you should go to the Emergency Room. Discharge and follow-up instructions given verbally/printed. Discharge and follow-up instructions discussed with the patient who expressed understanding and willingness to comply with my recommendations.Patient voiced understanding and in agreement with current treatment plan.     Please be advised this text was dictated with Curetis software and may contain errors due to translation.    Assessment:       1. Upper respiratory virus            Plan:         Upper respiratory virus  -     POCT COVID-19 Rapid Screening    Other orders  -     azelastine (ASTELIN) 137 mcg (0.1 %) nasal spray; 1 spray (137 mcg total) by Nasal route 2 (two) times daily as needed for Rhinitis.  Dispense: 30 mL; Refill: 0               Patient Instructions   PLEASE READ YOUR DISCHARGE INSTRUCTIONS ENTIRELY AS IT CONTAINS IMPORTANT INFORMATION.      Please drink plenty of fluids.    Please get plenty of rest.    Please return here or go to the Emergency Department for any concerns or worsening of condition.    Please take an over the counter antihistamine medication (allegra/Claritin/Zyrtec) of your choice as directed.    Try an over the counter decongestant like Mucinex D or Sudafed. You buy this behind the pharmacy counter    If you do have Hypertension or palpitations, it is safe to take Coricidin HBP for relief of sinus symptoms.    If not allergic, please take over the counter Tylenol (Acetaminophen) and/or Motrin (Ibuprofen) as directed for control of pain and/or fever.  Please follow up with your primary care doctor or specialist as needed.    Sore throat recommendations: Warm fluids, warm salt water gargles, throat lozenges, tea, honey, soup, rest, hydration.    Use over the counter flonase: one spray each nostril twice daily OR two sprays each nostril once daily.     If you  smoke, please stop smoking.      Please return or see your primary care doctor if  you develop new or worsening symptoms.     Please arrange follow up with your primary medical clinic as soon as possible. You must understand that you've received an Urgent Care treatment only and that you may be released before all of your medical problems are known or treated. You, the patient, will arrange for follow up as instructed. If your symptoms worsen or fail to improve you should go to the Emergency Room.

## 2023-02-08 ENCOUNTER — PATIENT MESSAGE (OUTPATIENT)
Dept: OBSTETRICS AND GYNECOLOGY | Facility: CLINIC | Age: 33
End: 2023-02-08
Payer: COMMERCIAL

## 2023-02-13 ENCOUNTER — OFFICE VISIT (OUTPATIENT)
Dept: OBSTETRICS AND GYNECOLOGY | Facility: CLINIC | Age: 33
End: 2023-02-13
Payer: COMMERCIAL

## 2023-02-13 VITALS — DIASTOLIC BLOOD PRESSURE: 66 MMHG | WEIGHT: 215.38 LBS | SYSTOLIC BLOOD PRESSURE: 107 MMHG | BODY MASS INDEX: 39.4 KG/M2

## 2023-02-13 DIAGNOSIS — Z01.419 WELL WOMAN EXAM WITH ROUTINE GYNECOLOGICAL EXAM: Primary | ICD-10-CM

## 2023-02-13 DIAGNOSIS — N93.9 ABNORMAL UTERINE BLEEDING (AUB): ICD-10-CM

## 2023-02-13 PROCEDURE — 99395 PR PREVENTIVE VISIT,EST,18-39: ICD-10-PCS | Mod: S$GLB,,, | Performed by: STUDENT IN AN ORGANIZED HEALTH CARE EDUCATION/TRAINING PROGRAM

## 2023-02-13 PROCEDURE — 1159F PR MEDICATION LIST DOCUMENTED IN MEDICAL RECORD: ICD-10-PCS | Mod: CPTII,S$GLB,, | Performed by: STUDENT IN AN ORGANIZED HEALTH CARE EDUCATION/TRAINING PROGRAM

## 2023-02-13 PROCEDURE — 87591 N.GONORRHOEAE DNA AMP PROB: CPT | Performed by: STUDENT IN AN ORGANIZED HEALTH CARE EDUCATION/TRAINING PROGRAM

## 2023-02-13 PROCEDURE — 81514 NFCT DS BV&VAGINITIS DNA ALG: CPT | Performed by: STUDENT IN AN ORGANIZED HEALTH CARE EDUCATION/TRAINING PROGRAM

## 2023-02-13 PROCEDURE — 88175 CYTOPATH C/V AUTO FLUID REDO: CPT | Performed by: STUDENT IN AN ORGANIZED HEALTH CARE EDUCATION/TRAINING PROGRAM

## 2023-02-13 PROCEDURE — 99999 PR PBB SHADOW E&M-EST. PATIENT-LVL III: ICD-10-PCS | Mod: PBBFAC,,, | Performed by: STUDENT IN AN ORGANIZED HEALTH CARE EDUCATION/TRAINING PROGRAM

## 2023-02-13 PROCEDURE — 87624 HPV HI-RISK TYP POOLED RSLT: CPT | Performed by: STUDENT IN AN ORGANIZED HEALTH CARE EDUCATION/TRAINING PROGRAM

## 2023-02-13 PROCEDURE — 3008F PR BODY MASS INDEX (BMI) DOCUMENTED: ICD-10-PCS | Mod: CPTII,S$GLB,, | Performed by: STUDENT IN AN ORGANIZED HEALTH CARE EDUCATION/TRAINING PROGRAM

## 2023-02-13 PROCEDURE — 3078F PR MOST RECENT DIASTOLIC BLOOD PRESSURE < 80 MM HG: ICD-10-PCS | Mod: CPTII,S$GLB,, | Performed by: STUDENT IN AN ORGANIZED HEALTH CARE EDUCATION/TRAINING PROGRAM

## 2023-02-13 PROCEDURE — 3074F PR MOST RECENT SYSTOLIC BLOOD PRESSURE < 130 MM HG: ICD-10-PCS | Mod: CPTII,S$GLB,, | Performed by: STUDENT IN AN ORGANIZED HEALTH CARE EDUCATION/TRAINING PROGRAM

## 2023-02-13 PROCEDURE — 87625 HPV TYPES 16 & 18 ONLY: CPT | Performed by: STUDENT IN AN ORGANIZED HEALTH CARE EDUCATION/TRAINING PROGRAM

## 2023-02-13 PROCEDURE — 99999 PR PBB SHADOW E&M-EST. PATIENT-LVL III: CPT | Mod: PBBFAC,,, | Performed by: STUDENT IN AN ORGANIZED HEALTH CARE EDUCATION/TRAINING PROGRAM

## 2023-02-13 PROCEDURE — 3008F BODY MASS INDEX DOCD: CPT | Mod: CPTII,S$GLB,, | Performed by: STUDENT IN AN ORGANIZED HEALTH CARE EDUCATION/TRAINING PROGRAM

## 2023-02-13 PROCEDURE — 99395 PREV VISIT EST AGE 18-39: CPT | Mod: S$GLB,,, | Performed by: STUDENT IN AN ORGANIZED HEALTH CARE EDUCATION/TRAINING PROGRAM

## 2023-02-13 PROCEDURE — 3074F SYST BP LT 130 MM HG: CPT | Mod: CPTII,S$GLB,, | Performed by: STUDENT IN AN ORGANIZED HEALTH CARE EDUCATION/TRAINING PROGRAM

## 2023-02-13 PROCEDURE — 1159F MED LIST DOCD IN RCRD: CPT | Mod: CPTII,S$GLB,, | Performed by: STUDENT IN AN ORGANIZED HEALTH CARE EDUCATION/TRAINING PROGRAM

## 2023-02-13 PROCEDURE — 3078F DIAST BP <80 MM HG: CPT | Mod: CPTII,S$GLB,, | Performed by: STUDENT IN AN ORGANIZED HEALTH CARE EDUCATION/TRAINING PROGRAM

## 2023-02-13 NOTE — PROGRESS NOTES
CC: Well Woman and Vaginal Bleeding (Spotting after cycle)    HPI:  Siobhan Chappell is a 33 y.o. female  presents with complaint of spotting and vaginal discharge. Reports normal monthyl periods until January - had a period at the beginning of the month then at the end again. Since two days after the end of month period, she had spotting and cramping for 4 days after. She has also noticed recently increasing discharge, no odor or itch but very liquidy and dark.     ROS:  GENERAL: No fever, chills, fatigability or weight loss.  VULVAR: No pain, no lesions and no itching.  VAGINAL: No relaxation, no itching, and no lesions.  ABDOMEN: No abdominal pain. Denies nausea. Denies vomiting. No diarrhea. No constipation  BREAST: Denies pain. No lumps. No discharge.  URINARY: No incontinence, no nocturia, no frequency and no dysuria.  CARDIOVASCULAR: No chest pain. No shortness of breath. No leg cramps.  NEUROLOGICAL: No headaches. No vision changes.      Patient History:  Past Medical History:   Diagnosis Date    Asthma      Past Surgical History:   Procedure Laterality Date     SECTION       Social History     Tobacco Use    Smoking status: Never    Smokeless tobacco: Never   Substance Use Topics    Alcohol use: No     Alcohol/week: 0.0 standard drinks    Drug use: No     Family History   Problem Relation Age of Onset    Diabetes Mother     Lupus Mother     No Known Problems Sister     No Known Problems Brother     No Known Problems Sister     No Known Problems Sister     No Known Problems Sister      OB History    Para Term  AB Living   3 2 2 0 1 2   SAB IAB Ectopic Multiple Live Births         0 2      # Outcome Date GA Lbr Emre/2nd Weight Sex Delivery Anes PTL Lv   3 Term 18 39w1d  3.817 kg (8 lb 6.6 oz) F CS-LTranv Spinal N MOLLY   2 AB 2012           1 Term 08    M CS-Classical  N MOLLY       Objective:   /66   Wt 97.7 kg (215 lb 6.2 oz)   LMP 2023   BMI 39.40  kg/m²   Patient's last menstrual period was 01/30/2023.      PHYSICAL EXAM:  APPEARANCE: Well nourished, well developed, in no acute distress.  AFFECT: WNL, alert and oriented x 3  SKIN: No acne or hirsutism  NECK: Neck symmetric without masses or thyromegaly  NODES: No inguinal, cervical, axillary, or femoral lymph node enlargement  CHEST: Good respiratory effect  ABDOMEN: Soft.  No tenderness or masses.  No hepatosplenomegaly.  No hernias.  PELVIC: Normal external genitalia without lesions.  Normal hair distribution.  Adequate perineal body, normal urethral meatus.  Vagina moist and well rugated without lesions. Small amount of white discharged mixed with dark blood.  Cervix pink, without lesions, discharge or tenderness.  No significant cystocele or rectocele.  Bimanual exam shows uterus to be normal size, regular, mobile and nontender.  Adnexa without masses or tenderness.  EXTREMITIES: No edema.      ASSESSMENT and PLAN:    ICD-10-CM ICD-9-CM    1. Well woman exam with routine gynecological exam  Z01.419 V72.31 Liquid-Based Pap Smear, Screening      HPV High Risk Genotypes, PCR      2. Abnormal uterine bleeding (AUB)  N93.9 626.9 US Pelvis Comp with Transvag NON-OB (xpd      C. trachomatis/N. gonorrhoeae by AMP DNA [facility]; Vagina      Vaginosis Screen by DNA Probe          Annual exam  pelvic exam: wnl  Patient counseled on ASCCP guidelines for cervical cytology screening  Cervical screening: completed today  Patient counseled on current recommendations for breast cancer screening  Mammogram screening: at 40  STD testing: completed today   Contraception: none   Osteoporosis screening at 65  Tobacco cessation counseling n/a      2. AUB  - discussed with patient possible etiologies of bleeding including hormonal abnormalities, structural abnormalities uterine (fibroid, endometrial polyp, hyperplasia), cervix (polyp, ectropion, infection), vaginal (trauma, infection), or bladder/urethra   - labs discussed but  patient decided to not complete lab work evaluation today: UPT, TSH, prolactin, CBC, STI testing (done today)  - imaging: TVUS discussed but patient decided to not complete at this time  - Patient desires to evaluate discharge/infection and consider labs/imaging for bleeding if bleeding remain irregular.            Follow up: as needed if symptoms worsen or 1 year for WWE      Stephanie Heaney, MD OBGYN [facility]

## 2023-02-14 LAB
C TRACH DNA SPEC QL NAA+PROBE: NOT DETECTED
N GONORRHOEA DNA SPEC QL NAA+PROBE: NOT DETECTED

## 2023-02-15 ENCOUNTER — HOSPITAL ENCOUNTER (OUTPATIENT)
Dept: RADIOLOGY | Facility: HOSPITAL | Age: 33
Discharge: HOME | End: 2023-02-15
Attending: STUDENT IN AN ORGANIZED HEALTH CARE EDUCATION/TRAINING PROGRAM
Payer: COMMERCIAL

## 2023-02-15 DIAGNOSIS — N93.9 ABNORMAL UTERINE BLEEDING (AUB): ICD-10-CM

## 2023-02-15 PROCEDURE — 76856 US EXAM PELVIC COMPLETE: CPT | Mod: TC

## 2023-02-15 PROCEDURE — 76830 TRANSVAGINAL US NON-OB: CPT | Mod: 26,,, | Performed by: RADIOLOGY

## 2023-02-15 PROCEDURE — 76830 US PELVIS COMP WITH TRANSVAG NON-OB (XPD): ICD-10-PCS | Mod: 26,,, | Performed by: RADIOLOGY

## 2023-02-15 PROCEDURE — 76856 US PELVIS COMP WITH TRANSVAG NON-OB (XPD): ICD-10-PCS | Mod: 26,,, | Performed by: RADIOLOGY

## 2023-02-15 PROCEDURE — 76856 US EXAM PELVIC COMPLETE: CPT | Mod: 26,,, | Performed by: RADIOLOGY

## 2023-02-16 LAB
BACTERIAL VAGINOSIS DNA: NEGATIVE
CANDIDA GLABRATA DNA: NEGATIVE
CANDIDA KRUSEI DNA: NEGATIVE
CANDIDA RRNA VAG QL PROBE: NEGATIVE
T VAGINALIS RRNA GENITAL QL PROBE: NEGATIVE

## 2023-02-21 LAB
CLINICAL INFO: NORMAL
CYTO CVX: NORMAL
CYTOLOGIST CVX/VAG CYTO: NORMAL
CYTOLOGIST CVX/VAG CYTO: NORMAL
CYTOLOGY CMNT CVX/VAG CYTO-IMP: NORMAL
CYTOLOGY PAP THIN PREP EXPLANATION: NORMAL
DATE OF PREVIOUS PAP: NORMAL
DATE PREVIOUS BX: NO
GEN CATEG CVX/VAG CYTO-IMP: NORMAL
HPV I/H RISK 4 DNA CVX QL NAA+PROBE: DETECTED
HPV16 DNA CVX QL PROBE+SIG AMP: NOT DETECTED
HPV18 DNA CVX QL PROBE+SIG AMP: NOT DETECTED
LMP START DATE: NORMAL
MICROORGANISM CVX/VAG CYTO: NORMAL
PATHOLOGIST CVX/VAG CYTO: NORMAL
SERVICE CMNT-IMP: NORMAL
SPECIMEN SOURCE CVX/VAG CYTO: NORMAL
STAT OF ADQ CVX/VAG CYTO-IMP: NORMAL

## 2023-05-10 ENCOUNTER — TELEPHONE (OUTPATIENT)
Dept: OBSTETRICS AND GYNECOLOGY | Facility: CLINIC | Age: 33
End: 2023-05-10
Payer: COMMERCIAL

## 2023-05-11 ENCOUNTER — OFFICE VISIT (OUTPATIENT)
Dept: OBSTETRICS AND GYNECOLOGY | Facility: CLINIC | Age: 33
End: 2023-05-11
Payer: COMMERCIAL

## 2023-05-11 VITALS — BODY MASS INDEX: 40 KG/M2 | SYSTOLIC BLOOD PRESSURE: 97 MMHG | DIASTOLIC BLOOD PRESSURE: 67 MMHG | WEIGHT: 218.69 LBS

## 2023-05-11 DIAGNOSIS — Z30.09 ENCOUNTER FOR COUNSELING REGARDING CONTRACEPTION: Primary | ICD-10-CM

## 2023-05-11 PROCEDURE — 3078F PR MOST RECENT DIASTOLIC BLOOD PRESSURE < 80 MM HG: ICD-10-PCS | Mod: CPTII,S$GLB,, | Performed by: STUDENT IN AN ORGANIZED HEALTH CARE EDUCATION/TRAINING PROGRAM

## 2023-05-11 PROCEDURE — 3074F PR MOST RECENT SYSTOLIC BLOOD PRESSURE < 130 MM HG: ICD-10-PCS | Mod: CPTII,S$GLB,, | Performed by: STUDENT IN AN ORGANIZED HEALTH CARE EDUCATION/TRAINING PROGRAM

## 2023-05-11 PROCEDURE — 99213 PR OFFICE/OUTPT VISIT, EST, LEVL III, 20-29 MIN: ICD-10-PCS | Mod: S$GLB,,, | Performed by: STUDENT IN AN ORGANIZED HEALTH CARE EDUCATION/TRAINING PROGRAM

## 2023-05-11 PROCEDURE — 3078F DIAST BP <80 MM HG: CPT | Mod: CPTII,S$GLB,, | Performed by: STUDENT IN AN ORGANIZED HEALTH CARE EDUCATION/TRAINING PROGRAM

## 2023-05-11 PROCEDURE — 99999 PR PBB SHADOW E&M-EST. PATIENT-LVL II: CPT | Mod: PBBFAC,,, | Performed by: STUDENT IN AN ORGANIZED HEALTH CARE EDUCATION/TRAINING PROGRAM

## 2023-05-11 PROCEDURE — 99999 PR PBB SHADOW E&M-EST. PATIENT-LVL II: ICD-10-PCS | Mod: PBBFAC,,, | Performed by: STUDENT IN AN ORGANIZED HEALTH CARE EDUCATION/TRAINING PROGRAM

## 2023-05-11 PROCEDURE — 1159F MED LIST DOCD IN RCRD: CPT | Mod: CPTII,S$GLB,, | Performed by: STUDENT IN AN ORGANIZED HEALTH CARE EDUCATION/TRAINING PROGRAM

## 2023-05-11 PROCEDURE — 3008F PR BODY MASS INDEX (BMI) DOCUMENTED: ICD-10-PCS | Mod: CPTII,S$GLB,, | Performed by: STUDENT IN AN ORGANIZED HEALTH CARE EDUCATION/TRAINING PROGRAM

## 2023-05-11 PROCEDURE — 1159F PR MEDICATION LIST DOCUMENTED IN MEDICAL RECORD: ICD-10-PCS | Mod: CPTII,S$GLB,, | Performed by: STUDENT IN AN ORGANIZED HEALTH CARE EDUCATION/TRAINING PROGRAM

## 2023-05-11 PROCEDURE — 3074F SYST BP LT 130 MM HG: CPT | Mod: CPTII,S$GLB,, | Performed by: STUDENT IN AN ORGANIZED HEALTH CARE EDUCATION/TRAINING PROGRAM

## 2023-05-11 PROCEDURE — 99213 OFFICE O/P EST LOW 20 MIN: CPT | Mod: S$GLB,,, | Performed by: STUDENT IN AN ORGANIZED HEALTH CARE EDUCATION/TRAINING PROGRAM

## 2023-05-11 PROCEDURE — 3008F BODY MASS INDEX DOCD: CPT | Mod: CPTII,S$GLB,, | Performed by: STUDENT IN AN ORGANIZED HEALTH CARE EDUCATION/TRAINING PROGRAM

## 2023-05-11 RX ORDER — NORETHINDRONE ACETATE AND ETHINYL ESTRADIOL 1MG-20(21)
1 KIT ORAL DAILY
Qty: 30 TABLET | Refills: 11 | Status: SHIPPED | OUTPATIENT
Start: 2023-05-11 | End: 2024-05-10

## 2023-05-11 NOTE — PROGRESS NOTES
CC: Contraception    HPI:  Siobhan Chappell is a 33 y.o. female  presents to discuss contraception. Since her last visit, has not had any abnormal bleeding. Would like to get nexplanon replaced, she had it previously and did not have any issues. Would like to start something today in meantime to ensure she is protected until we can place nexplanon.     ROS:  GENERAL: No fever, chills, fatigability or weight loss.  VULVAR: No pain, no lesions and no itching.  VAGINAL: No relaxation, no itching, no discharge, no abnormal bleeding and no lesions.  ABDOMEN: No abdominal pain. Denies nausea. Denies vomiting. No diarrhea. No constipation  BREAST: Denies pain. No lumps. No discharge.  URINARY: No incontinence, no nocturia, no frequency and no dysuria.  CARDIOVASCULAR: No chest pain. No shortness of breath. No leg cramps.  NEUROLOGICAL: No headaches. No vision changes.      Patient History:  Past Medical History:   Diagnosis Date    Asthma      Past Surgical History:   Procedure Laterality Date     SECTION       Social History     Tobacco Use    Smoking status: Never    Smokeless tobacco: Never   Substance Use Topics    Alcohol use: No     Alcohol/week: 0.0 standard drinks    Drug use: No     Family History   Problem Relation Age of Onset    Diabetes Mother     Lupus Mother     No Known Problems Sister     No Known Problems Brother     No Known Problems Sister     No Known Problems Sister     No Known Problems Sister      OB History    Para Term  AB Living   3 2 2 0 1 2   SAB IAB Ectopic Multiple Live Births         0 2      # Outcome Date GA Lbr Emre/2nd Weight Sex Delivery Anes PTL Lv   3 Term 18 39w1d  3.817 kg (8 lb 6.6 oz) F CS-LTranv Spinal N MOLLY   2 AB 2012           1 Term 08    M CS-Classical  N MOLLY       Objective:   BP 97/67   Wt 99.2 kg (218 lb 11.1 oz)   LMP 2023   BMI 40.00 kg/m²   Patient's last menstrual period was 2023.      PHYSICAL  EXAM:  APPEARANCE: Well nourished, well developed, in no acute distress.  AFFECT: WNL, alert and oriented x 3  SKIN: No acne or hirsutism  NECK: Neck symmetric without masses or thyromegaly  NODES: No inguinal, cervical, axillary, or femoral lymph node enlargement  CHEST: Good respiratory effect  EXTREMITIES: No edema.      ASSESSMENT and PLAN:    ICD-10-CM ICD-9-CM    1. Encounter for counseling regarding contraception  Z30.09 V25.09 Device Authorization Order      norethindrone-ethinyl estradiol (JUNEL FE 1/20) 1 mg-20 mcg (21)/75 mg (7) per tablet        Contraception counseling   - discussed options for contraception including OCPs, POPs, patches, vaginal ring prior to nexplanon placement. Risks and benefits of methods discussed, all patient questions answered.  - nexplanon ordered, scheduled procedure visit   - patient desires to start pills today to bridge, rx sent to patient pharmacy        Follow up: for nexplanon placement       Noreen Abraham MD  OBGYN [facility]

## 2023-05-11 NOTE — LETTER
May 11, 2023      [facility]  [address], [address]  [place] [address]  Phone: [phone]       Patient: Siobhan Chappell   YOB: 1990  Date of Visit: 05/11/2023    To Whom It May Concern:    Brenda Chappell  was at [facility] on 05/11/2023 she may return back to work with no restrictions. If you have any questions or concerns, or if I can be of further assistance, please do not hesitate to contact me.    Sincerely,    Elma Ruvalcaba MA

## 2023-05-22 ENCOUNTER — PROCEDURE VISIT (OUTPATIENT)
Dept: OBSTETRICS AND GYNECOLOGY | Facility: CLINIC | Age: 33
End: 2023-05-22
Payer: COMMERCIAL

## 2023-05-22 VITALS
HEIGHT: 62 IN | DIASTOLIC BLOOD PRESSURE: 62 MMHG | BODY MASS INDEX: 40.45 KG/M2 | SYSTOLIC BLOOD PRESSURE: 114 MMHG | WEIGHT: 219.81 LBS

## 2023-05-22 DIAGNOSIS — Z30.017 NEXPLANON INSERTION: Primary | ICD-10-CM

## 2023-05-22 PROCEDURE — 99499 NO LOS: ICD-10-PCS | Mod: S$GLB,,, | Performed by: STUDENT IN AN ORGANIZED HEALTH CARE EDUCATION/TRAINING PROGRAM

## 2023-05-22 PROCEDURE — 99499 UNLISTED E&M SERVICE: CPT | Mod: S$GLB,,, | Performed by: STUDENT IN AN ORGANIZED HEALTH CARE EDUCATION/TRAINING PROGRAM

## 2023-05-22 PROCEDURE — 11981 INSERTION DRUG DLVR IMPLANT: CPT | Mod: S$GLB,,, | Performed by: STUDENT IN AN ORGANIZED HEALTH CARE EDUCATION/TRAINING PROGRAM

## 2023-05-22 PROCEDURE — 11981 INSERTION OF NEXPLANON: ICD-10-PCS | Mod: S$GLB,,, | Performed by: STUDENT IN AN ORGANIZED HEALTH CARE EDUCATION/TRAINING PROGRAM

## 2023-05-22 NOTE — PROGRESS NOTES
"CC: nexplanon insertion     HPI:  Siobhan Chappell is a 33 y.o. female  presents for nexplanon insertion. Patient feels well today, has no complaints.     ROS:  GENERAL: No fever, chills, fatigability or weight loss.  VULVAR: No pain, no lesions and no itching.  VAGINAL: No relaxation, no itching, no discharge, no abnormal bleeding and no lesions.  ABDOMEN: No abdominal pain. Denies nausea. Denies vomiting. No diarrhea. No constipation  BREAST: Denies pain. No lumps. No discharge.  URINARY: No incontinence, no nocturia, no frequency and no dysuria.  CARDIOVASCULAR: No chest pain. No shortness of breath. No leg cramps.  NEUROLOGICAL: No headaches. No vision changes.      Patient History:  Past Medical History:   Diagnosis Date    Asthma      Past Surgical History:   Procedure Laterality Date     SECTION       Social History     Tobacco Use    Smoking status: Never    Smokeless tobacco: Never   Substance Use Topics    Alcohol use: No     Alcohol/week: 0.0 standard drinks    Drug use: No     Family History   Problem Relation Age of Onset    Diabetes Mother     Lupus Mother     No Known Problems Sister     No Known Problems Brother     No Known Problems Sister     No Known Problems Sister     No Known Problems Sister      OB History    Para Term  AB Living   3 2 2 0 1 2   SAB IAB Ectopic Multiple Live Births         0 2      # Outcome Date GA Lbr Emre/2nd Weight Sex Delivery Anes PTL Lv   3 Term 18 39w1d  3.817 kg (8 lb 6.6 oz) F CS-LTranv Spinal N MOLLY   2 AB 2012           1 Term 08    M CS-Classical  N MOLLY       Objective:   /62   Ht 5' 2" (1.575 m)   Wt 99.7 kg (219 lb 12.8 oz)   LMP 2023   BMI 40.20 kg/m²   Patient's last menstrual period was 2023.      PHYSICAL EXAM:  APPEARANCE: Well nourished, well developed, in no acute distress.  AFFECT: WNL, alert and oriented x 3  SKIN: No acne or hirsutism  NECK: Neck symmetric without masses or " thyromegaly  NODES: No inguinal, cervical, axillary, or femoral lymph node enlargement  CHEST: Good respiratory effect  EXTREMITIES: No edema.      ASSESSMENT and PLAN:    ICD-10-CM ICD-9-CM    1. Nexplanon insertion  Z30.017 V25.5           Nexplanon insertion   - discussed procedure, post procedure care and potential side effects with patient today, all questions answered  - consent forms reviewed and signed   - return precautions discussed   - nexplanon inserted today without complication, see procedure note     Follow up: as needed or 1 year for JOHN Abraham MD  OBGYN [facility]

## 2023-05-22 NOTE — PROCEDURES
Insertion of Nexplanon    Date/Time: 5/22/2023 10:00 AM  Performed by: Noreen Abraham MD  Authorized by: Noreen Abraham MD     Consent obtained:  Written  Consent given by:  Patient  Patient questions answered: yes    Patient agrees, verbalizes understanding, and wants to proceed: yes    Educational handouts given: yes    Instructions and paperwork completed: yes    Prepped with: alcohol 70% and povidone-iodine    Local anesthetic:  Lidocaine 1%  The site was cleaned and prepped in a sterile fashion: yes    Left/right:  Left   68 mg etonogestreL 68 mg  Nexplanon was inserted and trocar removed: yes    Palpitation confirms placement by provider and patient: yes    Site was closed with steri-strips and pressure bandage applied: yes

## 2023-09-01 ENCOUNTER — OFFICE VISIT (OUTPATIENT)
Dept: OBSTETRICS AND GYNECOLOGY | Facility: CLINIC | Age: 33
End: 2023-09-01
Payer: COMMERCIAL

## 2023-09-01 VITALS
DIASTOLIC BLOOD PRESSURE: 80 MMHG | BODY MASS INDEX: 40.93 KG/M2 | WEIGHT: 223.81 LBS | SYSTOLIC BLOOD PRESSURE: 117 MMHG

## 2023-09-01 DIAGNOSIS — Z30.46 NEXPLANON REMOVAL: Primary | ICD-10-CM

## 2023-09-01 DIAGNOSIS — Z30.09 ENCOUNTER FOR COUNSELING REGARDING CONTRACEPTION: ICD-10-CM

## 2023-09-01 PROCEDURE — 99999 PR PBB SHADOW E&M-EST. PATIENT-LVL III: CPT | Mod: PBBFAC,,, | Performed by: STUDENT IN AN ORGANIZED HEALTH CARE EDUCATION/TRAINING PROGRAM

## 2023-09-01 PROCEDURE — 11982 REMOVE DRUG IMPLANT DEVICE: CPT | Mod: S$GLB,,, | Performed by: STUDENT IN AN ORGANIZED HEALTH CARE EDUCATION/TRAINING PROGRAM

## 2023-09-01 PROCEDURE — 99999 PR PBB SHADOW E&M-EST. PATIENT-LVL III: ICD-10-PCS | Mod: PBBFAC,,, | Performed by: STUDENT IN AN ORGANIZED HEALTH CARE EDUCATION/TRAINING PROGRAM

## 2023-09-01 PROCEDURE — 99499 UNLISTED E&M SERVICE: CPT | Mod: S$GLB,,, | Performed by: STUDENT IN AN ORGANIZED HEALTH CARE EDUCATION/TRAINING PROGRAM

## 2023-09-01 PROCEDURE — 99499 NO LOS: ICD-10-PCS | Mod: S$GLB,,, | Performed by: STUDENT IN AN ORGANIZED HEALTH CARE EDUCATION/TRAINING PROGRAM

## 2023-09-01 PROCEDURE — 11982 REMOVAL OF NEXPLANON DEVICE: ICD-10-PCS | Mod: S$GLB,,, | Performed by: STUDENT IN AN ORGANIZED HEALTH CARE EDUCATION/TRAINING PROGRAM

## 2023-09-01 RX ORDER — LACTIC ACID, L-, CITRIC ACID MONOHYDRATE, AND POTASSIUM BITARTRATE 90; 50; 20 MG/5G; MG/5G; MG/5G
1 GEL VAGINAL
Qty: 60 G | Refills: 4 | Status: SHIPPED | OUTPATIENT
Start: 2023-09-01

## 2023-09-01 RX ORDER — PREDNISONE 20 MG/1
20 TABLET ORAL
COMMUNITY
Start: 2023-07-24 | End: 2023-12-15

## 2023-09-01 NOTE — PROGRESS NOTES
CC: nexplanon removal     HPI:  Siobhan Chappell is a 33 y.o. female  presents to have nexplanon removed. Since placement she feels like shes been having constant headaches, emotional changes, worsening acne. Last five weeks she has been bleeding. Would like to use some other form of contraception.     ROS:  GENERAL: No fever, chills, fatigability or weight loss.  VULVAR: No pain, no lesions and no itching.  VAGINAL: No relaxation, no itching, no discharge, no abnormal bleeding and no lesions.  ABDOMEN: No abdominal pain. Denies nausea. Denies vomiting. No diarrhea. No constipation  BREAST: Denies pain. No lumps. No discharge.  URINARY: No incontinence, no nocturia, no frequency and no dysuria.  CARDIOVASCULAR: No chest pain. No shortness of breath. No leg cramps.  NEUROLOGICAL: No headaches. No vision changes.      Patient History:  Past Medical History:   Diagnosis Date    Asthma      Past Surgical History:   Procedure Laterality Date     SECTION       Social History     Tobacco Use    Smoking status: Never    Smokeless tobacco: Never   Substance Use Topics    Alcohol use: No     Alcohol/week: 0.0 standard drinks of alcohol    Drug use: No     Family History   Problem Relation Age of Onset    Diabetes Mother     Lupus Mother     No Known Problems Sister     No Known Problems Brother     No Known Problems Sister     No Known Problems Sister     No Known Problems Sister      OB History    Para Term  AB Living   3 2 2 0 1 2   SAB IAB Ectopic Multiple Live Births         0 2      # Outcome Date GA Lbr Emre/2nd Weight Sex Delivery Anes PTL Lv   3 Term 18 39w1d  3.817 kg (8 lb 6.6 oz) F CS-LTranv Spinal N MOLLY   2 AB 2012           1 Term 08    M CS-Classical  N MOLLY       Objective:   /80   Wt 101.5 kg (223 lb 12.8 oz)   LMP 2023 Comment: has been bleeding for 5 weeks staright and the last week spotting  BMI 40.93 kg/m²   Patient's last menstrual period was  07/20/2023.      PHYSICAL EXAM:  APPEARANCE: Well nourished, well developed, in no acute distress.  AFFECT: WNL, alert and oriented x 3  SKIN: No acne or hirsutism  NECK: Neck symmetric without masses or thyromegaly  CHEST: Good respiratory effect  EXTREMITIES: No edema. Nexplanon palpated in LUE      ASSESSMENT and PLAN:    ICD-10-CM ICD-9-CM    1. Nexplanon removal  Z30.46 V25.43 lactic acid-citric-potassium (PHEXXI) 1.8-1-0.4 % Gel      2. Encounter for counseling regarding contraception  Z30.09 V25.09 lactic acid-citric-potassium (PHEXXI) 1.8-1-0.4 % Gel        Nexplanon removal   - discussed procedure, post procedure care and potential side effects with patient today, all questions answered  - consent forms reviewed and signed   - return precautions discussed   - nexplanon removed today without complication, see procedure note   - reviewed non-hormonal options as patient does not desire hormonal contraception including phexxi, paraguard IUD, LSC tubal/salpingectomy. Patient would like to try phexxi, will consider tubal ligation as she does not desire to have any more children.         Follow up: as needed       Noreen Abraham MD  OBGYN [facility]

## 2023-09-01 NOTE — PROCEDURES
Removal of Nexplanon Device    Date/Time: 9/1/2023 3:15 PM    Performed by: Noreen Abraham MD  Authorized by: Noreen Abraham MD    Consent obtained:  Written  Consent given by:  Patient  Procedure risks and benefits discussed: yes    Patient questions answered: yes    Patient agrees, verbalizes understanding, and wants to proceed: yes    Implant grasped by: hemostat  Other reason for removal:  Patient desired removal  Removed with no complications: yes    Arm: left  Palpation confirms location: yes  Small stab incision was made in arm: yes  Upon removal device was intact: yes  Site was close with steri-strips and pressure bandage applied: yes  Pre-procedure timeout performed: yes  Prepped with:  povidone-iodine 7.5% surgical scrub  Local anesthetic:  Lidocaine 1%   The site was cleaned  and prepped in a sterile fashion: yes  Specimen sent to pathology: Yes

## 2023-12-15 ENCOUNTER — OFFICE VISIT (OUTPATIENT)
Dept: INTERNAL MEDICINE | Facility: CLINIC | Age: 33
End: 2023-12-15
Payer: COMMERCIAL

## 2023-12-15 ENCOUNTER — LAB VISIT (OUTPATIENT)
Dept: LAB | Facility: HOSPITAL | Age: 33
End: 2023-12-15
Payer: COMMERCIAL

## 2023-12-15 VITALS
HEIGHT: 62 IN | WEIGHT: 226.44 LBS | SYSTOLIC BLOOD PRESSURE: 108 MMHG | DIASTOLIC BLOOD PRESSURE: 74 MMHG | BODY MASS INDEX: 41.67 KG/M2

## 2023-12-15 DIAGNOSIS — R51.9 NONINTRACTABLE EPISODIC HEADACHE, UNSPECIFIED HEADACHE TYPE: ICD-10-CM

## 2023-12-15 DIAGNOSIS — Z00.00 ANNUAL PHYSICAL EXAM: ICD-10-CM

## 2023-12-15 DIAGNOSIS — F41.9 ANXIETY: ICD-10-CM

## 2023-12-15 DIAGNOSIS — Z00.00 ANNUAL PHYSICAL EXAM: Primary | ICD-10-CM

## 2023-12-15 LAB
25(OH)D3+25(OH)D2 SERPL-MCNC: 16 NG/ML (ref 30–96)
ALBUMIN SERPL BCP-MCNC: 4.2 G/DL (ref 3.5–5.2)
ALP SERPL-CCNC: 94 U/L (ref 55–135)
ALT SERPL W/O P-5'-P-CCNC: 9 U/L (ref 10–44)
ANION GAP SERPL CALC-SCNC: 9 MMOL/L (ref 8–16)
AST SERPL-CCNC: 13 U/L (ref 10–40)
BASOPHILS # BLD AUTO: 0.06 K/UL (ref 0–0.2)
BASOPHILS NFR BLD: 0.5 % (ref 0–1.9)
BILIRUB SERPL-MCNC: 0.4 MG/DL (ref 0.1–1)
BUN SERPL-MCNC: 6 MG/DL (ref 6–20)
CALCIUM SERPL-MCNC: 9.4 MG/DL (ref 8.7–10.5)
CHLORIDE SERPL-SCNC: 106 MMOL/L (ref 95–110)
CHOLEST SERPL-MCNC: 134 MG/DL (ref 120–199)
CHOLEST/HDLC SERPL: 2.7 {RATIO} (ref 2–5)
CO2 SERPL-SCNC: 24 MMOL/L (ref 23–29)
CREAT SERPL-MCNC: 0.7 MG/DL (ref 0.5–1.4)
DIFFERENTIAL METHOD: ABNORMAL
EOSINOPHIL # BLD AUTO: 0.1 K/UL (ref 0–0.5)
EOSINOPHIL NFR BLD: 0.4 % (ref 0–8)
ERYTHROCYTE [DISTWIDTH] IN BLOOD BY AUTOMATED COUNT: 13.2 % (ref 11.5–14.5)
EST. GFR  (NO RACE VARIABLE): >60 ML/MIN/1.73 M^2
ESTIMATED AVG GLUCOSE: 128 MG/DL (ref 68–131)
FERRITIN SERPL-MCNC: 4 NG/ML (ref 20–300)
GLUCOSE SERPL-MCNC: 86 MG/DL (ref 70–110)
HBA1C MFR BLD: 6.1 % (ref 4–5.6)
HCT VFR BLD AUTO: 35.1 % (ref 37–48.5)
HDLC SERPL-MCNC: 50 MG/DL (ref 40–75)
HDLC SERPL: 37.3 % (ref 20–50)
HGB BLD-MCNC: 11.3 G/DL (ref 12–16)
IMM GRANULOCYTES # BLD AUTO: 0.02 K/UL (ref 0–0.04)
IMM GRANULOCYTES NFR BLD AUTO: 0.2 % (ref 0–0.5)
LDLC SERPL CALC-MCNC: 74.2 MG/DL (ref 63–159)
LYMPHOCYTES # BLD AUTO: 2.7 K/UL (ref 1–4.8)
LYMPHOCYTES NFR BLD: 23.9 % (ref 18–48)
MCH RBC QN AUTO: 27.4 PG (ref 27–31)
MCHC RBC AUTO-ENTMCNC: 32.2 G/DL (ref 32–36)
MCV RBC AUTO: 85 FL (ref 82–98)
MONOCYTES # BLD AUTO: 0.6 K/UL (ref 0.3–1)
MONOCYTES NFR BLD: 5.7 % (ref 4–15)
NEUTROPHILS # BLD AUTO: 7.7 K/UL (ref 1.8–7.7)
NEUTROPHILS NFR BLD: 69.3 % (ref 38–73)
NONHDLC SERPL-MCNC: 84 MG/DL
NRBC BLD-RTO: 0 /100 WBC
PLATELET # BLD AUTO: 339 K/UL (ref 150–450)
PMV BLD AUTO: 10.6 FL (ref 9.2–12.9)
POTASSIUM SERPL-SCNC: 3.8 MMOL/L (ref 3.5–5.1)
PROT SERPL-MCNC: 8.3 G/DL (ref 6–8.4)
RBC # BLD AUTO: 4.12 M/UL (ref 4–5.4)
SODIUM SERPL-SCNC: 139 MMOL/L (ref 136–145)
TRIGL SERPL-MCNC: 49 MG/DL (ref 30–150)
TSH SERPL DL<=0.005 MIU/L-ACNC: 1.27 UIU/ML (ref 0.4–4)
WBC # BLD AUTO: 11.14 K/UL (ref 3.9–12.7)

## 2023-12-15 PROCEDURE — 80061 LIPID PANEL: CPT | Performed by: PHYSICIAN ASSISTANT

## 2023-12-15 PROCEDURE — 82728 ASSAY OF FERRITIN: CPT | Performed by: PHYSICIAN ASSISTANT

## 2023-12-15 PROCEDURE — 85025 COMPLETE CBC W/AUTO DIFF WBC: CPT | Performed by: PHYSICIAN ASSISTANT

## 2023-12-15 PROCEDURE — 3078F DIAST BP <80 MM HG: CPT | Mod: CPTII,S$GLB,, | Performed by: PHYSICIAN ASSISTANT

## 2023-12-15 PROCEDURE — 3074F SYST BP LT 130 MM HG: CPT | Mod: CPTII,S$GLB,, | Performed by: PHYSICIAN ASSISTANT

## 2023-12-15 PROCEDURE — 1159F MED LIST DOCD IN RCRD: CPT | Mod: CPTII,S$GLB,, | Performed by: PHYSICIAN ASSISTANT

## 2023-12-15 PROCEDURE — 1160F PR REVIEW ALL MEDS BY PRESCRIBER/CLIN PHARMACIST DOCUMENTED: ICD-10-PCS | Mod: CPTII,S$GLB,, | Performed by: PHYSICIAN ASSISTANT

## 2023-12-15 PROCEDURE — 3074F PR MOST RECENT SYSTOLIC BLOOD PRESSURE < 130 MM HG: ICD-10-PCS | Mod: CPTII,S$GLB,, | Performed by: PHYSICIAN ASSISTANT

## 2023-12-15 PROCEDURE — 3078F PR MOST RECENT DIASTOLIC BLOOD PRESSURE < 80 MM HG: ICD-10-PCS | Mod: CPTII,S$GLB,, | Performed by: PHYSICIAN ASSISTANT

## 2023-12-15 PROCEDURE — 99395 PREV VISIT EST AGE 18-39: CPT | Mod: S$GLB,,, | Performed by: PHYSICIAN ASSISTANT

## 2023-12-15 PROCEDURE — 3044F HG A1C LEVEL LT 7.0%: CPT | Mod: CPTII,S$GLB,, | Performed by: PHYSICIAN ASSISTANT

## 2023-12-15 PROCEDURE — 83036 HEMOGLOBIN GLYCOSYLATED A1C: CPT | Performed by: PHYSICIAN ASSISTANT

## 2023-12-15 PROCEDURE — 1159F PR MEDICATION LIST DOCUMENTED IN MEDICAL RECORD: ICD-10-PCS | Mod: CPTII,S$GLB,, | Performed by: PHYSICIAN ASSISTANT

## 2023-12-15 PROCEDURE — 1160F RVW MEDS BY RX/DR IN RCRD: CPT | Mod: CPTII,S$GLB,, | Performed by: PHYSICIAN ASSISTANT

## 2023-12-15 PROCEDURE — 3008F BODY MASS INDEX DOCD: CPT | Mod: CPTII,S$GLB,, | Performed by: PHYSICIAN ASSISTANT

## 2023-12-15 PROCEDURE — 84443 ASSAY THYROID STIM HORMONE: CPT | Performed by: PHYSICIAN ASSISTANT

## 2023-12-15 PROCEDURE — 3008F PR BODY MASS INDEX (BMI) DOCUMENTED: ICD-10-PCS | Mod: CPTII,S$GLB,, | Performed by: PHYSICIAN ASSISTANT

## 2023-12-15 PROCEDURE — 3044F PR MOST RECENT HEMOGLOBIN A1C LEVEL <7.0%: ICD-10-PCS | Mod: CPTII,S$GLB,, | Performed by: PHYSICIAN ASSISTANT

## 2023-12-15 PROCEDURE — 99999 PR PBB SHADOW E&M-EST. PATIENT-LVL IV: ICD-10-PCS | Mod: PBBFAC,,, | Performed by: PHYSICIAN ASSISTANT

## 2023-12-15 PROCEDURE — 99395 PR PREVENTIVE VISIT,EST,18-39: ICD-10-PCS | Mod: S$GLB,,, | Performed by: PHYSICIAN ASSISTANT

## 2023-12-15 PROCEDURE — 99999 PR PBB SHADOW E&M-EST. PATIENT-LVL IV: CPT | Mod: PBBFAC,,, | Performed by: PHYSICIAN ASSISTANT

## 2023-12-15 PROCEDURE — 82306 VITAMIN D 25 HYDROXY: CPT | Performed by: PHYSICIAN ASSISTANT

## 2023-12-15 PROCEDURE — 80053 COMPREHEN METABOLIC PANEL: CPT | Performed by: PHYSICIAN ASSISTANT

## 2023-12-15 PROCEDURE — 36415 COLL VENOUS BLD VENIPUNCTURE: CPT | Performed by: PHYSICIAN ASSISTANT

## 2023-12-15 RX ORDER — ESCITALOPRAM OXALATE 5 MG/1
5 TABLET ORAL DAILY
Qty: 30 TABLET | Refills: 1 | Status: SHIPPED | OUTPATIENT
Start: 2023-12-15 | End: 2024-02-19

## 2023-12-15 NOTE — PATIENT INSTRUCTIONS
The primary care wellness therapist should call you.     [facility]    [facility].[employer]    Reduce Caffeine    Advil as needed okay

## 2023-12-15 NOTE — PROGRESS NOTES
Subjective     Patient ID: Siobhan Chappell is a 33 y.o. female.    Chief Complaint: Annual Exam, Anxiety, and Headache    HPI    Established pt of Rene Rasmussen MD (new to me)    Here for annual exam    Daily HA last 2.5 weeks, Advil(relieves), left sided.,  +photophobia, +nausea +phonophia, starts during the day (can last for entire day). New glasses. Reports excessive caffeine over the past few weeks, caffeine/redbull, not eating during the day, work stressors    Anxiety: chronic, years, is gradually worsening, always feeling on edge, not sleeping, public speaking,     Heavy menses since nexplanon removed 2-3 monts ago, advised on GYN f/u    Past Medical History:   Diagnosis Date    Asthma      Social History     Tobacco Use    Smoking status: Never    Smokeless tobacco: Never   Substance Use Topics    Alcohol use: No     Alcohol/week: 0.0 standard drinks of alcohol    Drug use: No     Review of patient's allergies indicates:  No Known Allergies      Review of Systems   Constitutional:  Negative for activity change and unexpected weight change.   HENT:  Negative for hearing loss, rhinorrhea and trouble swallowing.    Eyes:  Negative for discharge and visual disturbance.   Respiratory:  Negative for chest tightness and wheezing.    Cardiovascular:  Positive for chest pain (anxiety). Negative for palpitations.   Gastrointestinal:  Negative for blood in stool, constipation, diarrhea and vomiting.   Endocrine: Negative for polydipsia and polyuria.   Genitourinary:  Negative for difficulty urinating, dysuria, hematuria and menstrual problem.   Musculoskeletal:  Negative for arthralgias, joint swelling and neck pain.   Neurological:  Positive for headaches. Negative for weakness.   Psychiatric/Behavioral:  Positive for dysphoric mood. The patient is nervous/anxious.         Answers submitted by the patient for this visit:  Review of Systems Questionnaire (Submitted on 12/15/2023)  activity change:  "No  unexpected weight change: No  neck pain: No  hearing loss: No  rhinorrhea: No  trouble swallowing: No  eye discharge: No  visual disturbance: No  chest tightness: No  wheezing: No  chest pain: Yes  palpitations: No  blood in stool: No  constipation: No  vomiting: No  diarrhea: No  polydipsia: No  polyuria: No  difficulty urinating: No  hematuria: No  menstrual problem: No  dysuria: No  joint swelling: No  arthralgias: No  headaches: Yes  weakness: No  dysphoric mood: Yes  Objective  /74 (BP Location: Right arm, Patient Position: Sitting, BP Method: Large (Manual))   Ht 5' 2" (1.575 m)   Wt 102.7 kg (226 lb 6.6 oz)   LMP 11/23/2023 (Exact Date)   BMI 41.41 kg/m²       Physical Exam  Vitals reviewed.   Constitutional:       General: She is not in acute distress.     Appearance: She is well-developed. She is not ill-appearing.   HENT:      Head: Normocephalic and atraumatic.      Right Ear: Tympanic membrane, ear canal and external ear normal.      Left Ear: Tympanic membrane, ear canal and external ear normal.   Cardiovascular:      Rate and Rhythm: Normal rate and regular rhythm.      Heart sounds: No murmur heard.  Pulmonary:      Effort: Pulmonary effort is normal.      Breath sounds: Normal breath sounds. No wheezing or rales.   Abdominal:      General: Bowel sounds are normal.      Palpations: Abdomen is soft.      Tenderness: There is no abdominal tenderness.   Musculoskeletal:      Right lower leg: No edema.      Left lower leg: No edema.   Lymphadenopathy:      Cervical: No cervical adenopathy.   Skin:     General: Skin is warm and dry.      Findings: No rash.   Neurological:      Mental Status: She is alert.   Psychiatric:         Mood and Affect: Mood normal.            Assessment and Plan     1. Annual physical exam  -     CBC Auto Differential; Future; Expected date: 12/15/2023  -     Comprehensive Metabolic Panel; Future; Expected date: 12/15/2023  -     TSH; Future; Expected date: " 12/15/2023  -     Lipid Panel; Future; Expected date: 12/15/2023  -     Hemoglobin A1C; Future; Expected date: 12/15/2023  -     Vitamin D; Future; Expected date: 12/15/2023  -     Ferritin; Future; Expected date: 12/15/2023    2. Anxiety  -     EScitalopram oxalate (LEXAPRO) 5 MG Tab; Take 1 tablet (5 mg total) by mouth once daily.  Dispense: 30 tablet; Refill: 1  -     Ambulatory referral/consult to Primary Care Behavioral Health (Non-Opioids); Future; Expected date: 12/22/2023    3. Nonintractable episodic headache, unspecified headache type  -     CBC Auto Differential; Future; Expected date: 12/15/2023  -     Comprehensive Metabolic Panel; Future; Expected date: 12/15/2023  -     TSH; Future; Expected date: 12/15/2023  -     Lipid Panel; Future; Expected date: 12/15/2023  -     Hemoglobin A1C; Future; Expected date: 12/15/2023  -     Vitamin D; Future; Expected date: 12/15/2023  -     Ferritin; Future; Expected date: 12/15/2023      Resources provided for therapy  Trial of lexapro  Reduce caffeine/energy drinks  Further recs to follow pending lab results  F/u in 6 weeks for med eval or sooner if needed     Luna Hernandez PA-C

## 2023-12-18 ENCOUNTER — PATIENT MESSAGE (OUTPATIENT)
Dept: BEHAVIORAL HEALTH | Facility: CLINIC | Age: 33
End: 2023-12-18
Payer: COMMERCIAL

## 2023-12-22 ENCOUNTER — TELEPHONE (OUTPATIENT)
Dept: BEHAVIORAL HEALTH | Facility: CLINIC | Age: 33
End: 2023-12-22
Payer: COMMERCIAL

## 2023-12-28 ENCOUNTER — TELEPHONE (OUTPATIENT)
Dept: BEHAVIORAL HEALTH | Facility: CLINIC | Age: 33
End: 2023-12-28
Payer: COMMERCIAL

## 2023-12-28 ENCOUNTER — PATIENT MESSAGE (OUTPATIENT)
Dept: BEHAVIORAL HEALTH | Facility: CLINIC | Age: 33
End: 2023-12-28
Payer: COMMERCIAL

## 2023-12-29 ENCOUNTER — PATIENT MESSAGE (OUTPATIENT)
Dept: BEHAVIORAL HEALTH | Facility: CLINIC | Age: 33
End: 2023-12-29
Payer: COMMERCIAL

## 2024-01-09 ENCOUNTER — PATIENT MESSAGE (OUTPATIENT)
Dept: INTERNAL MEDICINE | Facility: CLINIC | Age: 34
End: 2024-01-09
Payer: COMMERCIAL

## 2024-01-17 ENCOUNTER — PATIENT MESSAGE (OUTPATIENT)
Dept: INTERNAL MEDICINE | Facility: CLINIC | Age: 34
End: 2024-01-17
Payer: COMMERCIAL

## 2024-01-17 DIAGNOSIS — E66.01 SEVERE OBESITY (BMI >= 40): Primary | ICD-10-CM

## 2024-01-19 ENCOUNTER — TELEPHONE (OUTPATIENT)
Dept: BARIATRICS | Facility: CLINIC | Age: 34
End: 2024-01-19
Payer: COMMERCIAL

## 2024-02-18 DIAGNOSIS — F41.9 ANXIETY: ICD-10-CM

## 2024-02-19 RX ORDER — ESCITALOPRAM OXALATE 5 MG/1
5 TABLET ORAL
Qty: 30 TABLET | Refills: 1 | Status: SHIPPED | OUTPATIENT
Start: 2024-02-19

## 2024-06-08 NOTE — PROGRESS NOTES
Patient presents today with amenorrhea. An office UPT confirms pregnancy. Initial OB ultrasound is scheduled for confirmation of viability and dates.  Laboratory studies have been ordered. LMP 5/25/2017; EDC 3/2/2018     The general plan for obstetrical visits, diagnostics, medication use and avoidance, physician on-call arrangements, and appropriate lifestyle adjustments were discussed.  Patient history reviewed and all questions and concerns were addressed.  1st child dx'd autism. Concern for this pregnancy.Hist C/S for CPD  
none

## 2024-07-03 ENCOUNTER — OFFICE VISIT (OUTPATIENT)
Facility: CLINIC | Age: 34
End: 2024-07-03
Payer: COMMERCIAL

## 2024-07-03 VITALS
BODY MASS INDEX: 40.48 KG/M2 | SYSTOLIC BLOOD PRESSURE: 132 MMHG | DIASTOLIC BLOOD PRESSURE: 64 MMHG | WEIGHT: 221.31 LBS

## 2024-07-03 DIAGNOSIS — N89.8 VAGINAL ITCHING: ICD-10-CM

## 2024-07-03 DIAGNOSIS — N39.0 RECURRENT UTI: ICD-10-CM

## 2024-07-03 DIAGNOSIS — Z01.419 WELL WOMAN EXAM WITH ROUTINE GYNECOLOGICAL EXAM: Primary | ICD-10-CM

## 2024-07-03 PROCEDURE — 3078F DIAST BP <80 MM HG: CPT | Mod: CPTII,S$GLB,, | Performed by: STUDENT IN AN ORGANIZED HEALTH CARE EDUCATION/TRAINING PROGRAM

## 2024-07-03 PROCEDURE — 87086 URINE CULTURE/COLONY COUNT: CPT | Performed by: STUDENT IN AN ORGANIZED HEALTH CARE EDUCATION/TRAINING PROGRAM

## 2024-07-03 PROCEDURE — 1159F MED LIST DOCD IN RCRD: CPT | Mod: CPTII,S$GLB,, | Performed by: STUDENT IN AN ORGANIZED HEALTH CARE EDUCATION/TRAINING PROGRAM

## 2024-07-03 PROCEDURE — 99395 PREV VISIT EST AGE 18-39: CPT | Mod: S$GLB,,, | Performed by: STUDENT IN AN ORGANIZED HEALTH CARE EDUCATION/TRAINING PROGRAM

## 2024-07-03 PROCEDURE — 3008F BODY MASS INDEX DOCD: CPT | Mod: CPTII,S$GLB,, | Performed by: STUDENT IN AN ORGANIZED HEALTH CARE EDUCATION/TRAINING PROGRAM

## 2024-07-03 PROCEDURE — 99999 PR PBB SHADOW E&M-EST. PATIENT-LVL III: CPT | Mod: PBBFAC,,, | Performed by: STUDENT IN AN ORGANIZED HEALTH CARE EDUCATION/TRAINING PROGRAM

## 2024-07-03 PROCEDURE — 3075F SYST BP GE 130 - 139MM HG: CPT | Mod: CPTII,S$GLB,, | Performed by: STUDENT IN AN ORGANIZED HEALTH CARE EDUCATION/TRAINING PROGRAM

## 2024-07-03 RX ORDER — NITROFURANTOIN 25; 75 MG/1; MG/1
100 CAPSULE ORAL DAILY PRN
Qty: 60 CAPSULE | Refills: 0 | Status: SHIPPED | OUTPATIENT
Start: 2024-07-03

## 2024-07-03 RX ORDER — FLUCONAZOLE 150 MG/1
TABLET ORAL
Qty: 2 TABLET | Refills: 0 | Status: SHIPPED | OUTPATIENT
Start: 2024-07-03

## 2024-07-03 NOTE — PROGRESS NOTES
CC: Well woman exam    HPI:  Siobhan Chappell is a 34 y.o. female  presents for a well woman exam.  She reports recurrent UTI recently, notices more after sex or periods. Feels like she may have one now, last in April diagnosed at urgent care. Also having mild vaginal itching.     Patient history:   Past Medical History:   Diagnosis Date    Asthma      Past Surgical History:   Procedure Laterality Date     SECTION       OB History    Para Term  AB Living   3 2 2 0 1 2   SAB IAB Ectopic Multiple Live Births         0 2      # Outcome Date GA Lbr Emre/2nd Weight Sex Type Anes PTL Lv   3 Term 18 39w1d  3.817 kg (8 lb 6.6 oz) F CS-LTranv Spinal N MOLLY   2 AB 2012           1 Term 08    M CS-Classical  N MOLLY       GYN  Menopausal: No  History of abnormal paps:  HPV pos   Abnormal or postmenopausal bleeding: DENIES  History of abnormal mammograms:N/A   Family history of breast or ovarian cancer: DENIES  Any breast masses, pain, skin changes, or nipple discharge: DENIES  Possible recent STD exposure: denies  Contraception:  phexxi    Pap: NILM/HPV+ (non-16/18)  Mammogram: N/A      Family History   Problem Relation Name Age of Onset    Diabetes Mother      Lupus Mother      No Known Problems Sister      No Known Problems Brother      No Known Problems Sister      No Known Problems Sister      No Known Problems Sister       Social History     Tobacco Use    Smoking status: Never    Smokeless tobacco: Never   Substance Use Topics    Alcohol use: No     Alcohol/week: 0.0 standard drinks of alcohol    Drug use: No     Allergies: Patient has no known allergies.    Current Outpatient Medications:     EScitalopram oxalate (LEXAPRO) 5 MG Tab, TAKE 1 TABLET(5 MG) BY MOUTH EVERY DAY, Disp: 30 tablet, Rfl: 1    ferrous sulfate (FEOSOL) 325 mg (65 mg iron) Tab tablet, Take 1 tablet (325 mg total) by mouth once daily., Disp: 30 tablet, Rfl: 4    albuterol (PROVENTIL/VENTOLIN HFA) 90  mcg/actuation inhaler, Inhale 1-2 puffs into the lungs every 6 (six) hours as needed for Wheezing. Rescue (Patient not taking: Reported on 9/19/2022), Disp: 8 g, Rfl: 6    azelastine (ASTELIN) 137 mcg (0.1 %) nasal spray, 1 spray (137 mcg total) by Nasal route 2 (two) times daily as needed for Rhinitis. (Patient not taking: Reported on 9/1/2023), Disp: 30 mL, Rfl: 0    fluconazole (DIFLUCAN) 150 MG Tab, Take one tablet then repeat in 72 hours if you continue to have symptoms., Disp: 2 tablet, Rfl: 0    lactic acid-citric-potassium (PHEXXI) 1.8-1-0.4 % Gel, Place 1 applicator vaginally as needed (used immediately before or up to 1 hour before each act of sex). (Patient not taking: Reported on 12/15/2023), Disp: 60 g, Rfl: 4    nitrofurantoin, macrocrystal-monohydrate, (MACROBID) 100 MG capsule, Take 1 capsule (100 mg total) by mouth daily as needed (for UTI prophylaxis after sex)., Disp: 60 capsule, Rfl: 0    norethindrone-ethinyl estradiol (JUNEL FE 1/20) 1 mg-20 mcg (21)/75 mg (7) per tablet, Take 1 tablet by mouth once daily. (Patient not taking: Reported on 9/1/2023), Disp: 30 tablet, Rfl: 11    Current Facility-Administered Medications:     etonogestreL subdermal device 68 mg, 68 mg, Implant, , Noreen Abraham MD, 68 mg at 05/22/23 1000       ROS:  GENERAL: Denies weight gain or weight loss. Feeling well overall.   SKIN: Denies rash or lesions.   HEAD: Denies head injury or headache.   NODES: Denies enlarged lymph nodes.   CHEST: Denies chest pain or shortness of breath.   CARDIOVASCULAR: Denies palpitations or left sided chest pain.   ABDOMEN: No abdominal pain, constipation, diarrhea, nausea, vomiting or rectal bleeding.   URINARY: No frequency, dysuria, hematuria, or burning on urination.  REPRODUCTIVE: See HPI.   BREASTS: The patient performs breast self-examination and denies pain, lumps, or nipple discharge.   HEMATOLOGIC: No easy bruisability or excessive bleeding.  MUSCULOSKELETAL: Denies joint pain  or swelling.   NEUROLOGIC: Denies syncope or weakness.   PSYCHIATRIC: Denies depression, anxiety or mood swings.    Objective:   /64   Wt 100.4 kg (221 lb 5.5 oz)   LMP 06/15/2024 (Exact Date)   BMI 40.48 kg/m²       Physical Exam:  APPEARANCE: Well nourished, well developed, in no acute distress.  AFFECT: WNL, alert and oriented x 3  SKIN: No acne or hirsutism  NECK: Neck symmetric without masses or thyromegaly  CHEST: Good respiratory effect  ABDOMEN: Soft.  No tenderness or masses.  No hepatosplenomegaly.  No hernias.  BREASTS: Symmetrical, no skin changes or visible lesions.  No palpable masses, nipple discharge bilaterally.  PELVIC: Normal external genitalia without lesions.  Normal hair distribution.  Adequate perineal body, normal urethral meatus.  Vagina moist and well rugated without lesions, small amount of white discharge.  Cervix pink, without lesions, discharge or tenderness.  No significant cystocele or rectocele.  Bimanual exam shows uterus to be normal size, regular, mobile and nontender.  Adnexa without masses or tenderness.   EXTREMITIES: No edema.    ASSESSMENT AND PLAN  1. Well woman exam with routine gynecological exam  Liquid-Based Pap Smear, Screening    HPV High Risk Genotypes, PCR      2. Recurrent UTI  Urine culture    nitrofurantoin, macrocrystal-monohydrate, (MACROBID) 100 MG capsule      3. Vaginal itching  fluconazole (DIFLUCAN) 150 MG Tab          Annual exam  Breast and pelvic exam: wnl  Will start diflucan for itching/discharge   Patient counseled on ASCCP guidelines for cervical cytology screening  Cervical screening: done today   Patient counseled on current recommendations for breast cancer screening  Mammogram screening: at 4-  STD testing: not requested   Osteoporosis screening at 65    2. Recurrent UTI with symptoms today   - urine culture collected, will treat as indicated   - discussed post coital ppx, patient would like to try, rx sent to pharmacy       She was  counseled to follow up with her PCP for other routine health maintenance      Follow up in about 1 year (around 7/3/2025), or if symptoms worsen or fail to improve.      Stephanie Heaney, MD OBGYN [facility]

## 2024-07-05 LAB
BACTERIA UR CULT: NORMAL
BACTERIA UR CULT: NORMAL

## 2024-07-10 ENCOUNTER — PATIENT MESSAGE (OUTPATIENT)
Facility: CLINIC | Age: 34
End: 2024-07-10
Payer: COMMERCIAL

## 2024-07-26 ENCOUNTER — OFFICE VISIT (OUTPATIENT)
Dept: INTERNAL MEDICINE | Facility: CLINIC | Age: 34
End: 2024-07-26
Payer: COMMERCIAL

## 2024-07-26 VITALS
HEIGHT: 62 IN | HEART RATE: 89 BPM | BODY MASS INDEX: 40.98 KG/M2 | SYSTOLIC BLOOD PRESSURE: 116 MMHG | OXYGEN SATURATION: 97 % | DIASTOLIC BLOOD PRESSURE: 70 MMHG | WEIGHT: 222.69 LBS

## 2024-07-26 DIAGNOSIS — F41.9 ANXIETY: ICD-10-CM

## 2024-07-26 DIAGNOSIS — G89.29 CHRONIC BILATERAL LOW BACK PAIN WITHOUT SCIATICA: ICD-10-CM

## 2024-07-26 DIAGNOSIS — R07.9 CHEST PAIN, UNSPECIFIED TYPE: Primary | ICD-10-CM

## 2024-07-26 DIAGNOSIS — Z32.01 POSITIVE PREGNANCY TEST: ICD-10-CM

## 2024-07-26 DIAGNOSIS — M54.50 CHRONIC BILATERAL LOW BACK PAIN WITHOUT SCIATICA: ICD-10-CM

## 2024-07-26 LAB
B-HCG UR QL: POSITIVE
CTP QC/QA: YES

## 2024-07-26 PROCEDURE — 81025 URINE PREGNANCY TEST: CPT | Mod: S$GLB,,, | Performed by: PHYSICIAN ASSISTANT

## 2024-07-26 PROCEDURE — 3074F SYST BP LT 130 MM HG: CPT | Mod: CPTII,S$GLB,, | Performed by: PHYSICIAN ASSISTANT

## 2024-07-26 PROCEDURE — 1159F MED LIST DOCD IN RCRD: CPT | Mod: CPTII,S$GLB,, | Performed by: PHYSICIAN ASSISTANT

## 2024-07-26 PROCEDURE — 99999 PR PBB SHADOW E&M-EST. PATIENT-LVL V: CPT | Mod: PBBFAC,,, | Performed by: PHYSICIAN ASSISTANT

## 2024-07-26 PROCEDURE — 3008F BODY MASS INDEX DOCD: CPT | Mod: CPTII,S$GLB,, | Performed by: PHYSICIAN ASSISTANT

## 2024-07-26 PROCEDURE — 3078F DIAST BP <80 MM HG: CPT | Mod: CPTII,S$GLB,, | Performed by: PHYSICIAN ASSISTANT

## 2024-07-26 PROCEDURE — 1160F RVW MEDS BY RX/DR IN RCRD: CPT | Mod: CPTII,S$GLB,, | Performed by: PHYSICIAN ASSISTANT

## 2024-07-26 PROCEDURE — 99214 OFFICE O/P EST MOD 30 MIN: CPT | Mod: S$GLB,,, | Performed by: PHYSICIAN ASSISTANT

## 2024-07-26 NOTE — PROGRESS NOTES
Subjective     Patient ID: Siohban Chappell is a 34 y.o. female.    Chief Complaint: Chest Pain and Back Pain    HPI    Established pt of Rene Rasmussen MD     Here with concerns of chronic chest and back pain.     Chest: over the past 7 months, initially attributed to anxiety, pain is sharp at certain moments then persistently dull. It happens spontaneously, not particularly associated with exertion. Noticed at anxious/stressful moments. Occurs daily. Worse with caffeine or energy drinks. +palpitations (skipping beat)  +Chest wall tenderness    +nausea for the past week, reports menstrual cycle is several days late.     In regards to anxiety, prev prescribed lexapro but never took consistently.     Back pain: several years, progressive worse, hx of scoliosis (seen on prior imaging). Worse sitting to standing, getting out of bed, sharp lower back pain. Has tried massage and OTC meds. No leg pain, n/t or extremity weakness. No bowel/bladder incontinence.       Dad with CAD (MI) age 58        Answers submitted by the patient for this visit:  Back Pain Questionnaire (Submitted on 7/26/2024)  Chief Complaint: Back pain  Chronicity: chronic  Onset: more than 1 year ago  Frequency: constantly  Progression since onset: rapidly worsening  Pain location: lumbar spine  Pain quality: aching, shooting, stabbing  Pain - numeric: 9/10  Pain is: the same all the time  Aggravated by: bending, coughing, lying down, sitting, standing  Stiffness is present: in the morning, at night  abdominal pain: No  bladder incontinence: No  bowel incontinence: No  chest pain: Yes  dysuria: No  fever: No  headaches: No  leg pain: No  numbness: No  paresis: No  paresthesias: No  pelvic pain: No  perianal numbness: No  tingling: No  weakness: Yes  weight loss: No  genital pain: No  hematuria: No  Pain severity: severe  Improvement on treatment: mild      Past Medical History:   Diagnosis Date    Asthma      Social History     Tobacco Use  "   Smoking status: Never    Smokeless tobacco: Never   Substance Use Topics    Alcohol use: No     Alcohol/week: 0.0 standard drinks of alcohol    Drug use: No     Review of patient's allergies indicates:  No Known Allergies      Review of Systems   Constitutional:  Negative for chills and fever.   Respiratory:  Negative for cough, shortness of breath and wheezing.    Cardiovascular:  Positive for chest pain and palpitations. Negative for leg swelling.   Gastrointestinal:  Negative for abdominal pain, nausea and vomiting.   Genitourinary:  Negative for bladder incontinence, dysuria, hematuria, pelvic pain, vaginal bleeding and vaginal discharge.   Musculoskeletal:  Positive for back pain. Negative for leg pain.   Neurological:  Positive for weakness. Negative for numbness and headaches.   Psychiatric/Behavioral:  The patient is nervous/anxious.           Objective  /70 (BP Location: Left arm, Patient Position: Sitting, BP Method: Large (Manual))   Pulse 89   Ht 5' 2" (1.575 m)   Wt 101 kg (222 lb 10.6 oz)   LMP 06/15/2024 (Exact Date)   SpO2 97%   BMI 40.73 kg/m²       Physical Exam  Constitutional:       General: She is not in acute distress.     Appearance: She is not ill-appearing.   HENT:      Head: Normocephalic and atraumatic.   Cardiovascular:      Rate and Rhythm: Normal rate and regular rhythm.   Pulmonary:      Effort: Pulmonary effort is normal. No respiratory distress.      Breath sounds: No wheezing.   Abdominal:      General: Bowel sounds are normal. There is no distension.      Palpations: Abdomen is soft.      Tenderness: There is no abdominal tenderness. There is no guarding.   Musculoskeletal:      Lumbar back: No bony tenderness. Decreased range of motion. Negative right straight leg raise test and negative left straight leg raise test.      Right lower leg: No edema.      Left lower leg: No edema.      Comments: Ambulating without difficulty    Lymphadenopathy:      Cervical: No " cervical adenopathy.   Skin:     Findings: No rash.   Neurological:      Mental Status: She is alert.   Psychiatric:         Attention and Perception: Attention normal.         Mood and Affect: Mood is anxious.         Speech: Speech normal.         Behavior: Behavior normal. Behavior is cooperative.         Thought Content: Thought content does not include homicidal or suicidal ideation.            Assessment and Plan     1. Chest pain, unspecified type  -     Cancel: X-Ray Chest PA And Lateral; Future; Expected date: 07/26/2024  -     Stress Echo Which stress agent will be used? Treadmill Exercise; Color Flow Doppler? No; Future  -     Holter monitor - 48 hour; Future    2. Anxiety  -     POCT Urine Pregnancy  -     Ambulatory referral/consult to Psychiatry; Future; Expected date: 08/02/2024    3. Chronic bilateral low back pain without sciatica  -     Cancel: X-Ray Lumbar Spine Ap And Lateral; Future; Expected date: 07/26/2024  -     Ambulatory referral/consult to Physical/Occupational Therapy; Future; Expected date: 08/02/2024    4. Positive pregnancy test  -     Ambulatory referral/consult to Obstetrics / Gynecology; Future; Expected date: 08/02/2024    POC Preg is positive  Xrays cancelled  Advised on OBGYN f/u  Okay for physical therapy for chronic back pain and w/u as above for chronic chest pain (suspect anxiety component)  Strict ED precautions discussed.     Luna Hernandez PA-C

## 2024-07-26 NOTE — PATIENT INSTRUCTIONS
Physical therapy should call you to schedule    [phone] (psychiatrist, therapist, call to schedule)      Schedule Echo and Holter monitor       No

## 2024-07-28 PROBLEM — O20.8 SUBCHORIONIC HEMORRHAGE OF PLACENTA IN FIRST TRIMESTER: Status: ACTIVE | Noted: 2024-07-28

## 2024-07-28 PROBLEM — N93.9 VAGINAL BLEEDING: Status: ACTIVE | Noted: 2024-07-28

## 2024-07-28 PROBLEM — O20.9 VAGINAL BLEEDING IN PREGNANCY, FIRST TRIMESTER: Status: ACTIVE | Noted: 2024-07-28

## 2024-07-30 ENCOUNTER — OFFICE VISIT (OUTPATIENT)
Dept: OBSTETRICS AND GYNECOLOGY | Facility: CLINIC | Age: 34
End: 2024-07-30
Payer: COMMERCIAL

## 2024-07-30 VITALS
BODY MASS INDEX: 40.56 KG/M2 | HEART RATE: 77 BPM | WEIGHT: 221.81 LBS | SYSTOLIC BLOOD PRESSURE: 98 MMHG | DIASTOLIC BLOOD PRESSURE: 59 MMHG

## 2024-07-30 DIAGNOSIS — Z32.01 POSITIVE PREGNANCY TEST: ICD-10-CM

## 2024-07-30 DIAGNOSIS — N91.2 AMENORRHEA: Primary | ICD-10-CM

## 2024-07-30 DIAGNOSIS — O21.9 NAUSEA/VOMITING IN PREGNANCY: ICD-10-CM

## 2024-07-30 PROCEDURE — 3008F BODY MASS INDEX DOCD: CPT | Mod: CPTII,S$GLB,, | Performed by: STUDENT IN AN ORGANIZED HEALTH CARE EDUCATION/TRAINING PROGRAM

## 2024-07-30 PROCEDURE — 81514 NFCT DS BV&VAGINITIS DNA ALG: CPT | Performed by: STUDENT IN AN ORGANIZED HEALTH CARE EDUCATION/TRAINING PROGRAM

## 2024-07-30 PROCEDURE — 99999 PR PBB SHADOW E&M-EST. PATIENT-LVL II: CPT | Mod: PBBFAC,,, | Performed by: STUDENT IN AN ORGANIZED HEALTH CARE EDUCATION/TRAINING PROGRAM

## 2024-07-30 PROCEDURE — 99214 OFFICE O/P EST MOD 30 MIN: CPT | Mod: S$GLB,,, | Performed by: STUDENT IN AN ORGANIZED HEALTH CARE EDUCATION/TRAINING PROGRAM

## 2024-07-30 PROCEDURE — 87491 CHLMYD TRACH DNA AMP PROBE: CPT | Performed by: STUDENT IN AN ORGANIZED HEALTH CARE EDUCATION/TRAINING PROGRAM

## 2024-07-30 PROCEDURE — 3078F DIAST BP <80 MM HG: CPT | Mod: CPTII,S$GLB,, | Performed by: STUDENT IN AN ORGANIZED HEALTH CARE EDUCATION/TRAINING PROGRAM

## 2024-07-30 PROCEDURE — 87086 URINE CULTURE/COLONY COUNT: CPT | Performed by: STUDENT IN AN ORGANIZED HEALTH CARE EDUCATION/TRAINING PROGRAM

## 2024-07-30 PROCEDURE — 3074F SYST BP LT 130 MM HG: CPT | Mod: CPTII,S$GLB,, | Performed by: STUDENT IN AN ORGANIZED HEALTH CARE EDUCATION/TRAINING PROGRAM

## 2024-07-30 PROCEDURE — 87591 N.GONORRHOEAE DNA AMP PROB: CPT | Performed by: STUDENT IN AN ORGANIZED HEALTH CARE EDUCATION/TRAINING PROGRAM

## 2024-07-30 RX ORDER — PROMETHAZINE HYDROCHLORIDE 25 MG/1
25 TABLET ORAL EVERY 4 HOURS
Qty: 60 TABLET | Refills: 1 | Status: SHIPPED | OUTPATIENT
Start: 2024-07-30

## 2024-07-30 NOTE — LETTER
July 30, 2024    Siobhan Chappell  [address]  [place] [address]              [facility]  [address]  [address]  [place] [address]  Phone: [phone]    To Whom It May Concern:    Ms. Chappell is currently under our care for pregnancy.  Estimated Date of Delivery: 3/22/25    Patient will be medically out of work until August 19th to ensure no further complications early in pregnancy. After that time we will clear her to return to normal duties as long as complete recovery has occurred.     Please let me know if you have any questions or need any additional paperwork/information from my office.       Sincerely,    Noreen Abraham MD

## 2024-07-31 ENCOUNTER — TELEPHONE (OUTPATIENT)
Dept: OBSTETRICS AND GYNECOLOGY | Facility: CLINIC | Age: 34
End: 2024-07-31
Payer: COMMERCIAL

## 2024-07-31 LAB
BACTERIA UR CULT: NORMAL
BACTERIA UR CULT: NORMAL
BACTERIAL VAGINOSIS DNA: NEGATIVE
C TRACH DNA SPEC QL NAA+PROBE: NOT DETECTED
CANDIDA GLABRATA DNA: NEGATIVE
CANDIDA KRUSEI DNA: NEGATIVE
CANDIDA RRNA VAG QL PROBE: NEGATIVE
N GONORRHOEA DNA SPEC QL NAA+PROBE: NOT DETECTED
T VAGINALIS RRNA GENITAL QL PROBE: NEGATIVE

## 2024-08-05 DIAGNOSIS — O46.90 VAGINAL BLEEDING IN PREGNANCY: Primary | ICD-10-CM

## 2024-08-06 ENCOUNTER — HOSPITAL ENCOUNTER (OUTPATIENT)
Dept: RADIOLOGY | Facility: HOSPITAL | Age: 34
Discharge: HOME | End: 2024-08-06
Attending: STUDENT IN AN ORGANIZED HEALTH CARE EDUCATION/TRAINING PROGRAM
Payer: COMMERCIAL

## 2024-08-06 ENCOUNTER — ROUTINE PRENATAL (OUTPATIENT)
Dept: OBSTETRICS AND GYNECOLOGY | Facility: CLINIC | Age: 34
End: 2024-08-06
Payer: COMMERCIAL

## 2024-08-06 VITALS — WEIGHT: 226 LBS | DIASTOLIC BLOOD PRESSURE: 76 MMHG | BODY MASS INDEX: 41.33 KG/M2 | SYSTOLIC BLOOD PRESSURE: 116 MMHG

## 2024-08-06 DIAGNOSIS — O03.9 MISCARRIAGE: Primary | ICD-10-CM

## 2024-08-06 DIAGNOSIS — O46.90 VAGINAL BLEEDING IN PREGNANCY: ICD-10-CM

## 2024-08-06 PROCEDURE — 76801 OB US < 14 WKS SINGLE FETUS: CPT | Mod: 26,,, | Performed by: RADIOLOGY

## 2024-08-06 PROCEDURE — 99214 OFFICE O/P EST MOD 30 MIN: CPT | Mod: S$GLB,,, | Performed by: STUDENT IN AN ORGANIZED HEALTH CARE EDUCATION/TRAINING PROGRAM

## 2024-08-06 PROCEDURE — 99999 PR PBB SHADOW E&M-EST. PATIENT-LVL III: CPT | Mod: PBBFAC,,, | Performed by: STUDENT IN AN ORGANIZED HEALTH CARE EDUCATION/TRAINING PROGRAM

## 2024-08-06 PROCEDURE — 76817 TRANSVAGINAL US OBSTETRIC: CPT | Mod: TC

## 2024-08-06 PROCEDURE — 76801 OB US < 14 WKS SINGLE FETUS: CPT | Mod: TC

## 2024-08-06 PROCEDURE — 76817 TRANSVAGINAL US OBSTETRIC: CPT | Mod: 26,,, | Performed by: RADIOLOGY

## 2024-08-06 RX ORDER — HYDROCODONE BITARTRATE AND ACETAMINOPHEN 5; 325 MG/1; MG/1
1 TABLET ORAL EVERY 6 HOURS PRN
Qty: 10 TABLET | Refills: 0 | Status: SHIPPED | OUTPATIENT
Start: 2024-08-06

## 2024-08-06 RX ORDER — NORETHINDRONE ACETATE AND ETHINYL ESTRADIOL 1MG-20(21)
1 KIT ORAL DAILY
Qty: 90 TABLET | Refills: 3 | Status: SHIPPED | OUTPATIENT
Start: 2024-08-06 | End: 2025-08-06

## 2024-08-06 RX ORDER — MISOPROSTOL 200 UG/1
800 TABLET ORAL ONCE
Qty: 4 TABLET | Refills: 0 | Status: SHIPPED | OUTPATIENT
Start: 2024-08-06 | End: 2024-08-06

## 2024-08-07 ENCOUNTER — TELEPHONE (OUTPATIENT)
Facility: CLINIC | Age: 34
End: 2024-08-07
Payer: COMMERCIAL

## 2024-08-20 ENCOUNTER — CLINICAL SUPPORT (OUTPATIENT)
Dept: REHABILITATION | Facility: HOSPITAL | Age: 34
End: 2024-08-20
Payer: COMMERCIAL

## 2024-08-20 DIAGNOSIS — G47.9 DISTURBANCE OF SLEEP PATTERN ASSOCIATED WITH PAIN: ICD-10-CM

## 2024-08-20 DIAGNOSIS — M54.50 CHRONIC BILATERAL LOW BACK PAIN WITHOUT SCIATICA: ICD-10-CM

## 2024-08-20 DIAGNOSIS — M25.69 BACK STIFFNESS: ICD-10-CM

## 2024-08-20 DIAGNOSIS — G89.29 CHRONIC BILATERAL LOW BACK PAIN WITHOUT SCIATICA: ICD-10-CM

## 2024-08-20 DIAGNOSIS — M54.50 LUMBAR SPINE PAINFUL ON MOVEMENT: Primary | ICD-10-CM

## 2024-08-20 DIAGNOSIS — R52 DISTURBANCE OF SLEEP PATTERN ASSOCIATED WITH PAIN: ICD-10-CM

## 2024-08-20 PROCEDURE — 97161 PT EVAL LOW COMPLEX 20 MIN: CPT | Mod: PO | Performed by: PHYSICAL THERAPIST

## 2024-08-20 NOTE — PROGRESS NOTES
[facility]   Physical Therapy Initial Evaluation     Date: 8/20/2024   Name: Siobhan Chappell  Clinic Number: [id number]    Therapy Diagnosis:   Encounter Diagnoses   Name Primary?    Chronic bilateral low back pain without sciatica     Lumbar spine painful on movement Yes    Disturbance of sleep pattern associated with pain     Back stiffness      Physician: Luna Hernandez PA-C    Physician Orders: PT Eval and low back   Medical Diagnosis from Referral:   Chronic bilateral low back pain without sciatica [M54.50, G89.29]   Evaluation Date: 8/20/2024  Authorization Period Expiration: 7/26/2025  Plan of Care Expiration: 11/20/2024  Progress Note Due: 9/20/2024  Visit # / Visits authorized: 1/ 1       Foto  Date / Visit# Score    #1/3 8/21/2024-1 37%   #2/3       #3/3                 Precautions: Standard    Time In: 1515  Time Out: 1610  Total Appointment Time (timed & untimed codes): 55 minutes      SUBJECTIVE       History of current condition: Siobhan reports no pain at the present but there is intermittent pain in the low back.       Date of onset: three years ago. Diagnosed with scoliosis. The pain began to intensify in January of 2024.  Sudden onset. Says she was getting out of bed and encountered an intensification of the pain. Coughing and sneezing were produced increased pain.   Falls: 0  Pain:  Current 0/10, worst 8/10, best 0/10   Location: left back and to the center.   Description: Sharp  Aggravating Factors: Laying and Getting out of bed/chair  Easing Factors:  bending over, gradually self limits.   Sleep: disturbed     Current Level of Function:   Personal - not limited but occasionally difficult to put pants on.   Domestic - not limited   Community - not limited   Occupation  - not limited unless siting for long periods   Sports/recreation/fitness - not restricted  Prior Level of Function: slightly better prior to January 2024  Prior Therapy: no   Social History:  single  lives with an adult  and their children  Occupation: assistant principle     Patients goals: to relieve the pain   Imaging, X-Rays : 2020    Mild lumbar scoliosis. The lumbosacral disc space is narrowed. The other disc spaces are well maintained. No fracture, spondylolisthesis or bone destruction identified       Medical History:   Past Medical History:   Diagnosis Date    Asthma        Surgical History:   Siobhan Chappell  has a past surgical history that includes  section.    Medications:   Siobhan has a current medication list which includes the following prescription(s): acetaminophen, albuterol, azelastine, escitalopram oxalate, ferrous sulfate, fluconazole, hydrocodone-acetaminophen, phexxi, misoprostol, nitrofurantoin (macrocrystal-monohydrate), norethindrone-ethinyl estradiol, and promethazine, and the following Facility-Administered Medications: etonogestrel.    Allergies:   Review of patient's allergies indicates:  No Known Allergies       OBJECTIVE       Posture Alignment: trunk deviated left, lateral shift. Increased lumbar lordosis. Forward bending > spine straightens and no evidence of prominence of the rib cage  Sensation: Light Touch: Intact  Palpation: tight right lumbar PSM, pain left sacral sulcus and over L5-S1.       Lumbar/Thoracic AROM: Pain/Dysfunction with Movement:   Flexion                    90/60=30 DP   Extension                20/10=10 DN   Right side bending         35 P left side    Left side bending            40 NP   Right rotation                          50%  DN   Left rotation                             50%  DP left side        LOWER EXTREMITY STRENGTH:   Left  5/5 Right  5/5           DTR's:   Left Right   Patella Tendon 2+ 2+   Achilles Tendon 2+ 2+           Selective Functional Movement Assessment:  FN: functional, non-painful  FP: functional, painful  DP: dysfunctional, painful  DN: dysfunctional, non-painful      Multi-Segmental Flexion:  see above   Multi-Segmental Extension: see above   Multi-Segmental Rotation:   right see above   left see above         FUNCTIONAL MOVEMENT BREAKOUTS:  Long sitting  = DN  SLR   -Active   right FN  70  left FN    70    -Passive  right FN  80  left FN     80    Prone Rocking Test = DN non uniform spine = lumbar spine flat   Knees to chest   Active - FN    Press up  - DN    Lumbar exten / rot  Active  R- DP  left side   L - DN  Passive  R - DP left side   L - FP left side     Lumbar locked rotation   Active  R - DP  L - DP  Passive  R - FN  L - FN    Hip extension   Active  R - FP  L - FP  Passive   R - FP  L - FP           GAIT: ambulates with no assistive device with independently.     GAIT DEVIATIONS: displays             Limitation/Restriction for FOTO lumbar Survey    Therapist reviewed FOTO scores for Siobhan Chappell on 8/20/2024.   FOTO documents entered into [employer] - see Media section.    Limitation Score: -%         TREATMENT     Total Treatment time (time-based codes) separate from Evaluation: 0 minutes      Siobhan received the treatments listed below:      therapeutic exercises to develop strength, endurance, ROM, and flexibility for 0 minutes including:  -    manual therapy techniques:  were applied to the: - for - minutes, including:  -    neuromuscular re-education activities to improve: Balance, Proprioception, and stability for - minutes. The following activities were included:  -    therapeutic activities to improve functional performance for -  minutes, including:  -    gait training to improve functional mobility and safety for -  minutes, including:  -    PATIENT EDUCATION AND HOME EXERCISES     Education provided:   --Findings of evaluation and examination, and affect of these on plan for treatment  -Prognosis and expectations  -Home exercise program and expectations of therapy     Written Home Exercises Provided: no.     ASSESSMENT     Siobhan is a 34 y.o. female referred to outpatient  Physical Therapy with a medical diagnosis of  Chronic bilateral low back pain without sciatica [M54.50, G89.29] . Patient presents with clinical findings of a spine flexion mobility dysfunction, lumbar extension rotation pain dysfunction, thorax Extension/ Rotation SMCD and a hip extension pain dysfunction.     Patient prognosis is Good.   Patient will benefit from skilled outpatient Physical Therapy to address the deficits stated above and in the chart below, provide patient /family education, and to maximize patientt's level of independence.     Plan of care discussed with patient: Yes  Patient's spiritual, cultural and educational needs considered and patient is agreeable to the plan of care and goals as stated below:     Anticipated Barriers for therapy: none    Medical Necessity is demonstrated by the following  History  Co-morbidities and personal factors that may impact the plan of care [x] LOW: no personal factors / co-morbidities  [] MODERATE: 1-2 personal factors / co-morbidities  [] HIGH: 3+ personal factors / co-morbidities    Moderate / High Support Documentation:   Co-morbidities affecting plan of care: none    Personal Factors:   no deficits     Examination  Body Structures and Functions, activity limitations and participation restrictions that may impact the plan of care [x] LOW: addressing 1-2 elements  [] MODERATE: 3+ elements  [] HIGH: 4+ elements (please support below)    Moderate / High Support Documentation: -     Clinical Presentation [x] LOW: stable  [] MODERATE: Evolving  [] HIGH: Unstable     Decision Making/ Complexity Score: low       Goals:    Goals to be met:    Short Term GOALS: 5 weeks. Pt agrees with goals set.  1. Pts pain intensity decreased 20-40% for improved quality of life and functional mobilty. ONGOING  2. Pt to demonstrate core activation with spinal stability during transitional movements for improved quality of movement. ONGOING  3. Pt to experience 50% or more reduced  interruption of sleep due to reduced pain for improved quality of life. ONGOING  4. Patient demonstrates independence with HEP for self management . ONGOING  5. Patient demonstrates independence with Postural Awareness for control of pain.ONGOING  6. Pt demonstrates active hip extension without pain A/P to improve functional mobility. ONGOING    Long Term GOALS: 10 weeks. Pt agrees with goals set.  1. Patient demonstrates increased Lumbar extension rotation mobility to 30 degrees with 50% or greater  reduction of pain to improve functional mobility and tolerance to functional activities. ONGOING  2. Patient demonstrates increased active thoracic extension rotation mobility to 50 degrees to improve functional mobility and tolerance to functional activities. ONGOING  3. Pt demonstrates  [place] with body mechanics to control episodes of pain associated with daily ADL it to stand from a chair and getting out of bed to improve functional lifestyle. ONGOING  5. Pts pain level decreased to 75% or greater for with sit to stand  for restoring functional mobility and ADL. ONGOING     Plan     Plan of care Certification: 8/20/2024 to 11/20/2024.    Outpatient Physical Therapy 2 times weekly for 10 weeks to include the following interventions: Manual Therapy, Neuromuscular Re-ed, Patient Education, Therapeutic Activities, and Therapeutic Exercise.     Jerry Silva, PT

## 2024-08-21 PROBLEM — M25.69 BACK STIFFNESS: Status: ACTIVE | Noted: 2024-08-21

## 2024-08-21 PROBLEM — M54.50 LUMBAR SPINE PAINFUL ON MOVEMENT: Status: ACTIVE | Noted: 2024-08-21

## 2024-08-21 PROBLEM — G47.9 DISTURBANCE OF SLEEP PATTERN ASSOCIATED WITH PAIN: Status: ACTIVE | Noted: 2024-08-21

## 2024-08-21 PROBLEM — R52 DISTURBANCE OF SLEEP PATTERN ASSOCIATED WITH PAIN: Status: ACTIVE | Noted: 2024-08-21

## 2024-08-21 NOTE — PLAN OF CARE
[facility]   Physical Therapy Initial Evaluation     Date: 8/20/2024   Name: Siobhan Chappell  Clinic Number: [id number]    Therapy Diagnosis:   Encounter Diagnoses   Name Primary?    Chronic bilateral low back pain without sciatica     Lumbar spine painful on movement Yes    Disturbance of sleep pattern associated with pain     Back stiffness      Physician: Luna Hernandez PA-C    Physician Orders: PT Eval and low back   Medical Diagnosis from Referral:   Chronic bilateral low back pain without sciatica [M54.50, G89.29]   Evaluation Date: 8/20/2024  Authorization Period Expiration: 7/26/2025  Plan of Care Expiration: 11/20/2024  Progress Note Due: 9/20/2024  Visit # / Visits authorized: 1/ 1       Foto  Date / Visit# Score    #1/3 8/21/2024-1 37%   #2/3       #3/3                 Precautions: Standard    Time In: 1515  Time Out: 1610  Total Appointment Time (timed & untimed codes): 55 minutes      SUBJECTIVE       History of current condition: Siobhan reports no pain at the present but there is intermittent pain in the low back.       Date of onset: three years ago. Diagnosed with scoliosis. The pain began to intensify in January of 2024.  Sudden onset. Says she was getting out of bed and encountered an intensification of the pain. Coughing and sneezing were produced increased pain.   Falls: 0  Pain:  Current 0/10, worst 8/10, best 0/10   Location: left back and to the center.   Description: Sharp  Aggravating Factors: Laying and Getting out of bed/chair  Easing Factors: bending over, gradually self limits.   Sleep: disturbed     Current Level of Function:   Personal - not limited but occasionally difficult to put pants on.   Domestic - not limited   Community - not limited   Occupation  - not limited unless siting for long periods   Sports/recreation/fitness - not restricted  Prior Level of Function: slightly better prior to January 2024  Prior Therapy: no   Social History:  single  lives with an adult  and their children  Occupation: assistant principle     Patients goals: to relieve the pain   Imaging, X-Rays : 2020    Mild lumbar scoliosis. The lumbosacral disc space is narrowed. The other disc spaces are well maintained. No fracture, spondylolisthesis or bone destruction identified       Medical History:   Past Medical History:   Diagnosis Date    Asthma        Surgical History:   Siobhan Chappell  has a past surgical history that includes  section.    Medications:   Siobhan has a current medication list which includes the following prescription(s): acetaminophen, albuterol, azelastine, escitalopram oxalate, ferrous sulfate, fluconazole, hydrocodone-acetaminophen, phexxi, misoprostol, nitrofurantoin (macrocrystal-monohydrate), norethindrone-ethinyl estradiol, and promethazine, and the following Facility-Administered Medications: etonogestrel.    Allergies:   Review of patient's allergies indicates:  No Known Allergies       OBJECTIVE       Posture Alignment: trunk deviated left, lateral shift. Increased lumbar lordosis. Forward bending > spine straightens and no evidence of prominence of the rib cage  Sensation: Light Touch: Intact  Palpation: tight right lumbar PSM, pain left sacral sulcus and over L5-S1.       Lumbar/Thoracic AROM: Pain/Dysfunction with Movement:   Flexion                    90/60=30 DP   Extension                20/10=10 DN   Right side bending         35 P left side    Left side bending            40 NP   Right rotation                          50%  DN   Left rotation                             50%  DP left side        LOWER EXTREMITY STRENGTH:   Left  5/5 Right  5/5           DTR's:   Left Right   Patella Tendon 2+ 2+   Achilles Tendon 2+ 2+           Selective Functional Movement Assessment:  FN: functional, non-painful  FP: functional, painful  DP: dysfunctional, painful  DN: dysfunctional, non-painful      Multi-Segmental Flexion:  see above   Multi-Segmental Extension: see above   Multi-Segmental Rotation:   right see above   left see above         FUNCTIONAL MOVEMENT BREAKOUTS:  Long sitting  = DN  SLR   -Active   right FN  70  left FN    70    -Passive  right FN  80  left FN     80    Prone Rocking Test = DN non uniform spine = lumbar spine flat   Knees to chest   Active - FN    Press up  - DN    Lumbar exten / rot  Active  R- DP  left side   L - DN  Passive  R - DP left side   L - FP left side     Lumbar locked rotation   Active  R - DP  L - DP  Passive  R - FN  L - FN    Hip extension   Active  R - FP  L - FP  Passive   R - FP  L - FP           GAIT: ambulates with no assistive device with independently.     GAIT DEVIATIONS: displays             Limitation/Restriction for FOTO lumbar Survey    Therapist reviewed FOTO scores for Siobhan Chappell on 8/20/2024.   FOTO documents entered into [employer] - see Media section.    Limitation Score: -%         TREATMENT     Total Treatment time (time-based codes) separate from Evaluation: 0 minutes      Siobhan received the treatments listed below:      therapeutic exercises to develop strength, endurance, ROM, and flexibility for 0 minutes including:  -    manual therapy techniques:  were applied to the: - for - minutes, including:  -    neuromuscular re-education activities to improve: Balance, Proprioception, and stability for - minutes. The following activities were included:  -    therapeutic activities to improve functional performance for -  minutes, including:  -    gait training to improve functional mobility and safety for -  minutes, including:  -    PATIENT EDUCATION AND HOME EXERCISES     Education provided:   --Findings of evaluation and examination, and affect of these on plan for treatment  -Prognosis and expectations  -Home exercise program and expectations of therapy     Written Home Exercises Provided: no.     ASSESSMENT     Siobhan is a 34 y.o. female referred to outpatient  Physical Therapy with a medical diagnosis of  Chronic bilateral low back pain without sciatica [M54.50, G89.29] . Patient presents with clinical findings of a spine flexion mobility dysfunction, lumbar extension rotation pain dysfunction, thorax Extension/ Rotation SMCD and a hip extension pain dysfunction.     Patient prognosis is Good.   Patient will benefit from skilled outpatient Physical Therapy to address the deficits stated above and in the chart below, provide patient /family education, and to maximize patientt's level of independence.     Plan of care discussed with patient: Yes  Patient's spiritual, cultural and educational needs considered and patient is agreeable to the plan of care and goals as stated below:     Anticipated Barriers for therapy: none    Medical Necessity is demonstrated by the following  History  Co-morbidities and personal factors that may impact the plan of care [x] LOW: no personal factors / co-morbidities  [] MODERATE: 1-2 personal factors / co-morbidities  [] HIGH: 3+ personal factors / co-morbidities    Moderate / High Support Documentation:   Co-morbidities affecting plan of care: none    Personal Factors:   no deficits     Examination  Body Structures and Functions, activity limitations and participation restrictions that may impact the plan of care [x] LOW: addressing 1-2 elements  [] MODERATE: 3+ elements  [] HIGH: 4+ elements (please support below)    Moderate / High Support Documentation: -     Clinical Presentation [x] LOW: stable  [] MODERATE: Evolving  [] HIGH: Unstable     Decision Making/ Complexity Score: low       Goals:    Goals to be met:    Short Term GOALS: 5 weeks. Pt agrees with goals set.  1. Pts pain intensity decreased 20-40% for improved quality of life and functional mobilty. ONGOING  2. Pt to demonstrate core activation with spinal stability during transitional movements for improved quality of movement. ONGOING  3. Pt to experience 50% or more reduced  interruption of sleep due to reduced pain for improved quality of life. ONGOING  4. Patient demonstrates independence with HEP for self management . ONGOING  5. Patient demonstrates independence with Postural Awareness for control of pain.ONGOING  6. Pt demonstrates active hip extension without pain A/P to improve functional mobility. ONGOING    Long Term GOALS: 10 weeks. Pt agrees with goals set.  1. Patient demonstrates increased Lumbar extension rotation mobility to 30 degrees with 50% or greater  reduction of pain to improve functional mobility and tolerance to functional activities. ONGOING  2. Patient demonstrates increased active thoracic extension rotation mobility to 50 degrees to improve functional mobility and tolerance to functional activities. ONGOING  3. Pt demonstrates  [place] with body mechanics to control episodes of pain associated with daily ADL it to stand from a chair and getting out of bed to improve functional lifestyle. ONGOING  5. Pts pain level decreased to 75% or greater for with sit to stand  for restoring functional mobility and ADL. ONGOING     Plan     Plan of care Certification: 8/20/2024 to 11/20/2024.    Outpatient Physical Therapy 2 times weekly for 10 weeks to include the following interventions: Manual Therapy, Neuromuscular Re-ed, Patient Education, Therapeutic Activities, and Therapeutic Exercise.     Jerry Silva, PT

## 2024-08-22 ENCOUNTER — PATIENT MESSAGE (OUTPATIENT)
Dept: OBSTETRICS AND GYNECOLOGY | Facility: CLINIC | Age: 34
End: 2024-08-22

## 2024-08-22 ENCOUNTER — OFFICE VISIT (OUTPATIENT)
Dept: OBSTETRICS AND GYNECOLOGY | Facility: CLINIC | Age: 34
End: 2024-08-22
Payer: COMMERCIAL

## 2024-08-22 ENCOUNTER — LAB VISIT (OUTPATIENT)
Dept: LAB | Facility: HOSPITAL | Age: 34
End: 2024-08-22
Attending: STUDENT IN AN ORGANIZED HEALTH CARE EDUCATION/TRAINING PROGRAM
Payer: COMMERCIAL

## 2024-08-22 VITALS
HEIGHT: 62 IN | WEIGHT: 225.06 LBS | BODY MASS INDEX: 41.42 KG/M2 | SYSTOLIC BLOOD PRESSURE: 113 MMHG | DIASTOLIC BLOOD PRESSURE: 76 MMHG

## 2024-08-22 DIAGNOSIS — O03.9 MISCARRIAGE: Primary | ICD-10-CM

## 2024-08-22 DIAGNOSIS — O03.9 MISCARRIAGE: ICD-10-CM

## 2024-08-22 DIAGNOSIS — F32.0 CURRENT MILD EPISODE OF MAJOR DEPRESSIVE DISORDER WITHOUT PRIOR EPISODE: ICD-10-CM

## 2024-08-22 LAB — HCG INTACT+B SERPL-ACNC: 12 MIU/ML

## 2024-08-22 PROCEDURE — 99214 OFFICE O/P EST MOD 30 MIN: CPT | Mod: S$GLB,,, | Performed by: STUDENT IN AN ORGANIZED HEALTH CARE EDUCATION/TRAINING PROGRAM

## 2024-08-22 PROCEDURE — 3078F DIAST BP <80 MM HG: CPT | Mod: CPTII,S$GLB,, | Performed by: STUDENT IN AN ORGANIZED HEALTH CARE EDUCATION/TRAINING PROGRAM

## 2024-08-22 PROCEDURE — 3008F BODY MASS INDEX DOCD: CPT | Mod: CPTII,S$GLB,, | Performed by: STUDENT IN AN ORGANIZED HEALTH CARE EDUCATION/TRAINING PROGRAM

## 2024-08-22 PROCEDURE — 36415 COLL VENOUS BLD VENIPUNCTURE: CPT | Performed by: STUDENT IN AN ORGANIZED HEALTH CARE EDUCATION/TRAINING PROGRAM

## 2024-08-22 PROCEDURE — 99999 PR PBB SHADOW E&M-EST. PATIENT-LVL III: CPT | Mod: PBBFAC,,, | Performed by: STUDENT IN AN ORGANIZED HEALTH CARE EDUCATION/TRAINING PROGRAM

## 2024-08-22 PROCEDURE — 1159F MED LIST DOCD IN RCRD: CPT | Mod: CPTII,S$GLB,, | Performed by: STUDENT IN AN ORGANIZED HEALTH CARE EDUCATION/TRAINING PROGRAM

## 2024-08-22 PROCEDURE — 84702 CHORIONIC GONADOTROPIN TEST: CPT | Performed by: STUDENT IN AN ORGANIZED HEALTH CARE EDUCATION/TRAINING PROGRAM

## 2024-08-22 PROCEDURE — 3074F SYST BP LT 130 MM HG: CPT | Mod: CPTII,S$GLB,, | Performed by: STUDENT IN AN ORGANIZED HEALTH CARE EDUCATION/TRAINING PROGRAM

## 2024-08-22 RX ORDER — SERTRALINE HYDROCHLORIDE 25 MG/1
25 TABLET, FILM COATED ORAL DAILY
Qty: 30 TABLET | Refills: 11 | Status: SHIPPED | OUTPATIENT
Start: 2024-08-22 | End: 2025-08-22

## 2024-08-22 NOTE — PROGRESS NOTES
CC: Follow-up    HPI:  Siobhan Chappell is a 34 y.o. female  presents for follow up after miscarriage. Not having any more pain, still having spotting. She lost her uncle last Friday and tried to return to work Monday, after everything she has been through it was very difficult. She was having a significant hard day Tuesday, her principal had her leave home. She has spoken with her HR department and plans to complete medical leave as she is healing/dealing with her current mental health crisis. She is scheduled to start therapy  with [facility] (first available). She does feel supported at home but has hard time getting out of bed. Experiencing depressed mood, sleep changes, no desire to do things that normally bring her jason.     ROS:  GENERAL: No fever, chills, fatigability or weight loss.  VULVAR: No pain, no lesions and no itching.  VAGINAL: No relaxation, no itching, no discharge, and no lesions.  ABDOMEN: No abdominal pain. Denies nausea. Denies vomiting. No diarrhea. No constipation  BREAST: Denies pain. No lumps. No discharge.  URINARY: No incontinence, no nocturia, no frequency and no dysuria.  CARDIOVASCULAR: No chest pain. No shortness of breath. No leg cramps.  NEUROLOGICAL: No headaches. No vision changes.      Patient History:  Past Medical History:   Diagnosis Date    Asthma      Past Surgical History:   Procedure Laterality Date     SECTION       Social History     Tobacco Use    Smoking status: Never    Smokeless tobacco: Never   Substance Use Topics    Alcohol use: No     Alcohol/week: 0.0 standard drinks of alcohol    Drug use: No     Family History   Problem Relation Name Age of Onset    Diabetes Mother      Lupus Mother      No Known Problems Sister      No Known Problems Brother      No Known Problems Sister      No Known Problems Sister      No Known Problems Sister       OB History    Para Term  AB Living   4 2 2 0 1 2   SAB IAB Ectopic Multiple Live  "Births         0 2      # Outcome Date GA Lbr Emre/2nd Weight Sex Type Anes PTL Lv   4             3 Term 18 39w1d  3.817 kg (8 lb 6.6 oz) F CS-LTranv Spinal N MOLLY   2 AB            1 Term 08    M CS-Classical  N MOLLY       Objective:   /76   Ht 5' 2" (1.575 m)   Wt 102.1 kg (225 lb 1.4 oz)   LMP 06/15/2024 (Exact Date)   Breastfeeding Unknown   BMI 41.17 kg/m²   Patient's last menstrual period was 06/15/2024 (exact date).      PHYSICAL EXAM:  APPEARANCE: Well nourished, well developed, in no acute distress.  AFFECT: WNL, alert and oriented x 3  SKIN: No acne or hirsutism  NECK: Neck symmetric without masses or thyromegaly  CHEST: Good respiratory effect  EXTREMITIES: No edema.      ASSESSMENT and PLAN:    ICD-10-CM ICD-9-CM    1. Miscarriage  O03.9 634.90 HCG, QUANTITATIVE, PREGNANCY      2. Current mild episode of major depressive disorder without prior episode  F32.0 296.21 sertraline (ZOLOFT) 25 MG tablet          Miscarriage, complete  - will repeat bHCG to ensure decrease to negative  - last bHCG 97696 > 1894  - birth control pills plan to start start for contraception this      2. Depression   - discussed support available and patient is scheduled for counseling   - no thoughts of harm to self or others   - will trial zoloft to see if it helps during this recovery time  - patient has emailed leave paperwork she needs completed     Follow up: as needed if symptoms worsen or 3-6 months check in       Noreen Abraham MD  OBGYN [facility]       "

## 2024-09-18 ENCOUNTER — CLINICAL SUPPORT (OUTPATIENT)
Dept: REHABILITATION | Facility: HOSPITAL | Age: 34
End: 2024-09-18
Payer: COMMERCIAL

## 2024-09-18 DIAGNOSIS — M54.50 LUMBAR SPINE PAINFUL ON MOVEMENT: Primary | ICD-10-CM

## 2024-09-18 DIAGNOSIS — G47.9 DISTURBANCE OF SLEEP PATTERN ASSOCIATED WITH PAIN: ICD-10-CM

## 2024-09-18 DIAGNOSIS — M25.69 BACK STIFFNESS: ICD-10-CM

## 2024-09-18 DIAGNOSIS — R52 DISTURBANCE OF SLEEP PATTERN ASSOCIATED WITH PAIN: ICD-10-CM

## 2024-09-18 PROCEDURE — 97110 THERAPEUTIC EXERCISES: CPT | Mod: PO | Performed by: PHYSICAL THERAPIST

## 2024-09-18 PROCEDURE — 97112 NEUROMUSCULAR REEDUCATION: CPT | Mod: PO | Performed by: PHYSICAL THERAPIST

## 2024-09-18 NOTE — PROGRESS NOTES
"              [facility]   Physical Therapy Treatment Note     Name: Siobhan Chappell  Clinic Number: [id number]    Therapy Diagnosis:   Encounter Diagnoses   Name Primary?    Lumbar spine painful on movement Yes    Disturbance of sleep pattern associated with pain     Back stiffness      Physician: Luna Hernandez PA-C    Visit Date: 2024    Physician Orders: PT Eval and low back   Medical Diagnosis from Referral:   Chronic bilateral low back pain without sciatica [M54.50, G89.29]   Evaluation Date: 2024  Authorization Period Expiration: 2025  Plan of Care Expiration: 2024  Progress Note Due: 2024  Visit # / Visits authorized:        Foto  Date / Visit# Score    #1/3 2024-1 37%   #2/3       #3/3         PTA Visit #: -/   Precautions: Standard    Time In: 1010  Time Out: 1110  Total Billable Time: 60 minutes      SUBJECTIVE     Pt reports: that she has not been able to return due to having to return to [place] for a  and the weather. Still  reporting pain in the back. She says it has not changed much.   She was not issued a home exercise program at [facility]  Response to previous treatment: [facility] performed   Functional change: ongoing    Pain: 3 /10  Location: left lower back        OBJECTIVE           TREATMENT        Siobhan received the treatments listed below:     .BOLD INDICATES ACTIVITIES PERFORMED / DISCUSSED     Leg press   Recumbent bike  Upright bike   UE ergometer  Treadmill   Elliptical          THERAPEUTIC EXERCISES to develop  strength, endurance, ROM, and flexibility for 25 minutes including   SUPINE  -SKC 5" hold x 12   -LTR x15     SIDELYING  -open book lumbar biased     PRONE  -sust exten 3'   -    STANDING.  -camel extension x10  -back bend x10    SITTING  -     NEUROMUSCULAR RE-EDUCATION activities to improve: Balance, Proprioception, motor control and stability  for 30 minutes. The following activities were " "included:    SUPINE  -pelvic tilts  x15  -ball squeeze / moisés abd hold 6" ea x 10   -bridge with clams (hip abd)  x12   -SI MARY in hook lying (hold hip flexion / opp foot push down into table)     SIDELYING  -MARY of the multifidus and obliques   -MARY of the psoas, multifidus, obliques    PRONE  +ball squeeze / moisés abd hold 6" ea x 10   -    STANDING.  -    SITTING  -     THERAPEUTIC ACTIVITIES to improve functional performance for -  minutes, including:  -     MANUAL THERAPY TECHNIQUES  were applied to the lumbar region for 5 minutes.  -cephalocaudad oscillations centrally and left unilateral. This was followed by MARY of the psoas and multifidus in SL position.       MODALITY      PATIENT EDUCATION AND HOME EXERCISES     Home Exercises Provided and Patient Education Provided     Education provided:   --Findings of evaluation and examination, and affect of these on plan for treatment  -Prognosis and expectations  -Home exercise program and expectations of therapy     Written Home Exercises Provided: no  ASSESSMENT     The patient appeared to respond to manual interventions in a positive fashion. She initially was challenged with pelvic tilting due to pain on the left side however after MARY of the psoas and multifidus she was able to perform with reduced pain. All exercises were completed without difficulty however she stated her pain was generally unchanged.     Siobhan Is progressing towards her goals.   Pt prognosis is Good.     Pt will continue to benefit from skilled outpatient physical therapy to address the deficits listed in the problem list box on initial evaluation, provide pt/family education and to maximize pt's level of independence in the home and community environment.     Pt's spiritual, cultural and educational needs considered and pt agreeable to plan of care and goals.     Anticipated barriers to physical therapy: none     Goals:   Goals to be met:     Short Term GOALS: 5 weeks. Pt agrees with goals " set.  1. Pts pain intensity decreased 20-40% for improved quality of life and functional mobilty. ONGOING  2. Pt to demonstrate core activation with spinal stability during transitional movements for improved quality of movement. ONGOING  3. Pt to experience 50% or more reduced interruption of sleep due to reduced pain for improved quality of life. ONGOING  4. Patient demonstrates independence with HEP for self management . ONGOING  5. Patient demonstrates independence with Postural Awareness for control of pain.ONGOING  6. Pt demonstrates active hip extension without pain A/P to improve functional mobility. ONGOING     Long Term GOALS: 10 weeks. Pt agrees with goals set.  1. Patient demonstrates increased Lumbar extension rotation mobility to 30 degrees with 50% or greater  reduction of pain to improve functional mobility and tolerance to functional activities. ONGOING  2. Patient demonstrates increased active thoracic extension rotation mobility to 50 degrees to improve functional mobility and tolerance to functional activities. ONGOING  3. Pt demonstrates  [place] with body mechanics to control episodes of pain associated with daily ADL it to stand from a chair and getting out of bed to improve functional lifestyle. ONGOING  5. Pts pain level decreased to 75% or greater for with sit to stand  for restoring functional mobility and ADL. ONGOING    PLAN     Plan of care Certification: 8/20/2024 to 11/20/2024.     Outpatient Physical Therapy 2 times weekly for 10 weeks to include the following interventions: Manual Therapy, Neuromuscular Re-ed, Patient Education, Therapeutic Activities, and Therapeutic Exercise.     Jerry Silva, PT

## 2024-09-20 ENCOUNTER — CLINICAL SUPPORT (OUTPATIENT)
Dept: REHABILITATION | Facility: HOSPITAL | Age: 34
End: 2024-09-20
Payer: COMMERCIAL

## 2024-09-20 ENCOUNTER — CLINICAL SUPPORT (OUTPATIENT)
Dept: PSYCHIATRY | Facility: CLINIC | Age: 34
End: 2024-09-20
Payer: COMMERCIAL

## 2024-09-20 DIAGNOSIS — M25.69 BACK STIFFNESS: ICD-10-CM

## 2024-09-20 DIAGNOSIS — G47.9 DISTURBANCE OF SLEEP PATTERN ASSOCIATED WITH PAIN: ICD-10-CM

## 2024-09-20 DIAGNOSIS — M54.50 LUMBAR SPINE PAINFUL ON MOVEMENT: Primary | ICD-10-CM

## 2024-09-20 DIAGNOSIS — F32.1 MDD (MAJOR DEPRESSIVE DISORDER), SINGLE EPISODE, MODERATE: Primary | ICD-10-CM

## 2024-09-20 DIAGNOSIS — Z63.0 RELATIONSHIP PROBLEM WITH BOYFRIEND: ICD-10-CM

## 2024-09-20 DIAGNOSIS — R52 DISTURBANCE OF SLEEP PATTERN ASSOCIATED WITH PAIN: ICD-10-CM

## 2024-09-20 DIAGNOSIS — F41.1 GAD (GENERALIZED ANXIETY DISORDER): ICD-10-CM

## 2024-09-20 PROCEDURE — 97110 THERAPEUTIC EXERCISES: CPT | Mod: PO | Performed by: PHYSICAL THERAPIST

## 2024-09-20 PROCEDURE — 97112 NEUROMUSCULAR REEDUCATION: CPT | Mod: PO | Performed by: PHYSICAL THERAPIST

## 2024-09-20 PROCEDURE — 90837 PSYTX W PT 60 MINUTES: CPT | Mod: S$GLB,,, | Performed by: CASE MANAGER/CARE COORDINATOR

## 2024-09-20 SDOH — SOCIAL DETERMINANTS OF HEALTH (SDOH): PROBLEMS IN RELATIONSHIP WITH SPOUSE OR PARTNER: Z63.0

## 2024-09-20 NOTE — PROGRESS NOTES
"              [facility]   Physical Therapy Treatment Note     Name: Siobhan Chappell  Clinic Number: [id number]    Therapy Diagnosis:   Encounter Diagnoses   Name Primary?    Lumbar spine painful on movement Yes    Disturbance of sleep pattern associated with pain     Back stiffness      Physician: Luna Hernandez PA-C    Visit Date: 9/20/2024    Physician Orders: PT Eval and low back   Medical Diagnosis from Referral:   Chronic bilateral low back pain without sciatica [M54.50, G89.29]   Evaluation Date: 8/20/2024  Authorization Period Expiration: 7/26/2025  Plan of Care Expiration: 11/20/2024  Progress Note Due: 9/20/2024  Visit # / Visits authorized: 1/ 1       Foto  Date / Visit# Score    #1/3 8/21/2024-1 37%   #2/3       #3/3         PTA Visit #: -/5   Precautions: Standard    Time In: 1110  Time Out: 1210  Total Billable Time: 60 minutes      SUBJECTIVE     Pt reports: she has some pain. It is tolerable. Got up this AM and had some struggle to get up. After moving around it improves.   She was not issued a home exercise program at   Response to previous treatment: That day continued to feel pain on the left side but felt fine the next day.     Functional change: ongoing    Pain: 3 /10  Location: left lower back        OBJECTIVE           TREATMENT        Siobhan received the treatments listed below:     .BOLD INDICATES ACTIVITIES PERFORMED / DISCUSSED     Leg press   Recumbent bike  Upright bike   UE ergometer  Treadmill   Elliptical          THERAPEUTIC EXERCISES to develop  strength, endurance, ROM, and flexibility for 25 minutes including   SUPINE  -SKC 5" hold x 12   -LTR x15     SIDELYING  -open book lumbar biased x15    PRONE  -sust exten 3'   -    STANDING.  -camel extension x10  -back bend x10    SITTING  -     NEUROMUSCULAR RE-EDUCATION activities to improve: Balance, Proprioception, motor control and stability  for 25 minutes. The following activities were " "included:    SUPINE  -pelvic tilts  x15  -ball squeeze / moisés abd hold 6" ea x 10   -bridge with clams (hip abd)  x15  -SI MARY in hook lying (hold hip flexion / opp foot push down into table)     SIDELYING  -MARY of the multifidus and obliques   -MARY of the psoas, multifidus, obliques    PRONE  -ball squeeze / moisés abd hold 6" ea x 10   -    STANDING.  -    SITTING  -     THERAPEUTIC ACTIVITIES to improve functional performance for -  minutes, including:  -     MANUAL THERAPY TECHNIQUES  were applied to the lumbar region for 0 minutes.  -cephalocaudad oscillations centrally and left unilateral. This was followed by MARY of the psoas and multifidus in SL position.       MODALITY      PATIENT EDUCATION AND HOME EXERCISES     Home Exercises Provided and Patient Education Provided     Education provided:   --Findings of evaluation and examination, and affect of these on plan for treatment  -Prognosis and expectations  -Home exercise program and expectations of therapy     Written Home Exercises Provided: no  ASSESSMENT     Siobhan performed the noted activities without limitation. She maintained reported pain at the end of the session that was unchanged. Movements are not compromised. Progress with stability training, core and trunk strengthening. No one movement exacerbates her pain.     Siobhan Is progressing towards her goals.   Pt prognosis is Good.     Pt will continue to benefit from skilled outpatient physical therapy to address the deficits listed in the problem list box on initial evaluation, provide pt/family education and to maximize pt's level of independence in the home and community environment.     Pt's spiritual, cultural and educational needs considered and pt agreeable to plan of care and goals.     Anticipated barriers to physical therapy: none     Goals:   Goals to be met:     Short Term GOALS: 5 weeks. Pt agrees with goals set.  1. Pts pain intensity decreased 20-40% for improved quality of life and " functional mobilty. ONGOING  2. Pt to demonstrate core activation with spinal stability during transitional movements for improved quality of movement. ONGOING  3. Pt to experience 50% or more reduced interruption of sleep due to reduced pain for improved quality of life. ONGOING  4. Patient demonstrates independence with HEP for self management . ONGOING  5. Patient demonstrates independence with Postural Awareness for control of pain.ONGOING  6. Pt demonstrates active hip extension without pain A/P to improve functional mobility. ONGOING     Long Term GOALS: 10 weeks. Pt agrees with goals set.  1. Patient demonstrates increased Lumbar extension rotation mobility to 30 degrees with 50% or greater  reduction of pain to improve functional mobility and tolerance to functional activities. ONGOING  2. Patient demonstrates increased active thoracic extension rotation mobility to 50 degrees to improve functional mobility and tolerance to functional activities. ONGOING  3. Pt demonstrates  [place] with body mechanics to control episodes of pain associated with daily ADL it to stand from a chair and getting out of bed to improve functional lifestyle. ONGOING  5. Pts pain level decreased to 75% or greater for with sit to stand  for restoring functional mobility and ADL. ONGOING    PLAN     Plan of care Certification: 8/20/2024 to 11/20/2024.     Outpatient Physical Therapy 2 times weekly for 10 weeks to include the following interventions: Manual Therapy, Neuromuscular Re-ed, Patient Education, Therapeutic Activities, and Therapeutic Exercise.     Jerry Silva, PT

## 2024-09-23 ENCOUNTER — E-VISIT (OUTPATIENT)
Dept: FAMILY MEDICINE | Facility: CLINIC | Age: 34
End: 2024-09-23
Payer: COMMERCIAL

## 2024-09-23 DIAGNOSIS — F32.A DEPRESSION, UNSPECIFIED DEPRESSION TYPE: Primary | ICD-10-CM

## 2024-09-23 RX ORDER — SERTRALINE HYDROCHLORIDE 50 MG/1
50 TABLET, FILM COATED ORAL DAILY
Qty: 30 TABLET | Refills: 1 | Status: SHIPPED | OUTPATIENT
Start: 2024-09-23 | End: 2025-09-23

## 2024-09-23 NOTE — PROGRESS NOTES
"Patient ID: Siobhan Chappell is a 34 y.o. female.    Chief Complaint: General Illness (Entered automatically based on patient selection in [employer].)          274}  The patient initiated a request through [employer] on 9/23/2024 for evaluation and management with a chief complaint of General Illness (Entered automatically based on patient selection in [employer].)     I evaluated the questionnaire submission on 09/23/2024 .    Total Time (in minutes): 7     [facility] E[facility]-Medication    9/23/2024 12:00 PM CDT - Filed by Patient   What do you need help with? Depression   Do you agree to participate in an E-Visit? Yes   If you have any of the following symptoms, please present to your local emergency room or call 911:  I acknowledge   Medication requests for narcotics will not be addressed via an E-Visit.  Please schedule an appointment. I acknowledge   Are you pregnant, could you be pregnant, or are you breast feeding? None of the above   What is the main issue you would like addressed today? Change in medications dosage, due to feelings of sadness and crying persisting.   Fear of embarrassment causes me to avoid doing things or speaking to people. Yes   I avoid activities in which I am the center of attention. Yes   Being embarrassed or looking stupid are among my worst fears. Yes   I would like to address: Medication for Anxiety or Depression   By selecting "I understand," you acknowledge that the answers that you provide for this questionnaire may not be immediately viewed by your provider or your care team. If you are personally dealing with suicidal thoughts or a crisis, please consider contacting your provider's office.  If your provider is not available, please consider taking action by: calling 911 or the National Suicide Prevention Lifeline any day, any time at 1-269.640.2702 or texting SIGNS to 025790 for 24/7 anonymous, free crisis counseling. I understand   Over the last 2 weeks, how often have " you been bothered by the following problems?   Little interest or pleasure in doing things Nearly every day   Feeling down, depressed, or hopeless Nearly every day   PHQ-2 Score (range: 0 - 6) 6 (Further screening recommended)   Feeling nervous, anxious, on edge Nearly everyday   Not being able to stop or control worrying Nearly everyday   Worrying too much about different things Nearly everyday   Trouble relaxing Nearly everyday   Being so restless that its hard to sit still Nearly everyday   Becoming easily annoyed or irritable Nearly everyday   Feeling afraid as if something awful might happen Nearly everyday   If you marked you are experiencing any of the aforementioned problems, how difficult have these made it for you to do your work, take care of things at home, or get along with other people? Extremely difficult   TOTAL SCORE: (range: 0 - 21) 21   Do you want to address a new or existing medication? I would like to address a medication I currently take   Would you like to change or continue your medication? Continue medication   What medication would you like to continue?  Zoloft   Are you taking it as prescribed? Yes    What medical condition is the  medication intended to treat? Depression   Is the medication helping your condition? No   Are you having any side effects from the medication? No   Provide any additional information you feel is important. I would like to increase the mg for the current medication I am taking. I have been taking the medication for 5 weeks. Feelings of sadness ans crying persists.   Please attach any relevant images or files    Are you able to take your vital signs? No          Active Problem List with Overview Notes    Diagnosis Date Noted    Lumbar spine painful on movement 08/21/2024    Disturbance of sleep pattern associated with pain 08/21/2024    Back stiffness 08/21/2024    Vaginal bleeding in pregnancy, first trimester 07/28/2024    Vaginal bleeding 07/28/2024     Subchorionic hemorrhage of placenta in first trimester 2024    Asthma 2020    History of  2017      Recent Labs Obtained:  Lab Results   Component Value Date    WBC 9.48 2024    HGB 10.9 (L) 2024    HCT 33.3 (L) 2024    MCV 87 2024     2024     2024    K 3.9 2024    GLU 85 2024    CREATININE 0.8 2024    EGFRNORACEVR >60.0 2024    HGBA1C 6.1 (H) 12/15/2023    TSH 1.266 12/15/2023      Review of patient's allergies indicates:  No Known Allergies    Encounter Diagnosis   Name Primary?    Depression, unspecified depression type Yes        No orders of the defined types were placed in this encounter.     Medications Ordered This Encounter   Medications    sertraline (ZOLOFT) 50 MG tablet     Sig: Take 1 tablet (50 mg total) by mouth once daily.     Dispense:  30 tablet     Refill:  1        E-Visit Time Tracking:    Day 1 Time (in minutes): 7    Total Time (in minutes): 7      274}

## 2024-09-25 ENCOUNTER — CLINICAL SUPPORT (OUTPATIENT)
Dept: REHABILITATION | Facility: HOSPITAL | Age: 34
End: 2024-09-25
Payer: COMMERCIAL

## 2024-09-25 DIAGNOSIS — G47.9 DISTURBANCE OF SLEEP PATTERN ASSOCIATED WITH PAIN: ICD-10-CM

## 2024-09-25 DIAGNOSIS — R52 DISTURBANCE OF SLEEP PATTERN ASSOCIATED WITH PAIN: ICD-10-CM

## 2024-09-25 DIAGNOSIS — M25.69 BACK STIFFNESS: ICD-10-CM

## 2024-09-25 DIAGNOSIS — M54.50 LUMBAR SPINE PAINFUL ON MOVEMENT: Primary | ICD-10-CM

## 2024-09-25 PROCEDURE — 97110 THERAPEUTIC EXERCISES: CPT | Mod: PO,CQ

## 2024-09-25 PROCEDURE — 97112 NEUROMUSCULAR REEDUCATION: CPT | Mod: PO,CQ

## 2024-09-25 NOTE — PROGRESS NOTES
"[facility]   Physical Therapy Treatment Note     Name: Siobhan Chappell  Clinic Number: [id number]    Therapy Diagnosis:   Encounter Diagnoses   Name Primary?    Lumbar spine painful on movement Yes    Disturbance of sleep pattern associated with pain     Back stiffness      Physician: Luna Hernandez PA-C    Visit Date: 9/25/2024    Physician Orders: PT Eval and low back   Medical Diagnosis from Referral:   Chronic bilateral low back pain without sciatica [M54.50, G89.29]   Evaluation Date: 8/20/2024  Authorization Period Expiration: 7/26/2025  Plan of Care Expiration: 11/20/2024  Progress Note Due: 9/20/2024  Visit # / Visits authorized: 1/ 1, 3/20       Foto  Date / Visit# Score    #1/3 8/21/2024-1 37%   #2/3       #3/3         PTA Visit #: 1/5   Precautions: Standard    Time In: 9:00 am  Time Out: 9:55 am  Total Billable Time: 55 minutes      SUBJECTIVE     Pt reports: she continues with low back pain although it improves as the day goes on.  She was not issued a home exercise program at   Response to previous treatment: That day continued to feel pain on the left side but felt fine the next day.     Functional change: ongoing    Pain: 3 /10  Location: left lower back        OBJECTIVE           TREATMENT        Siobhan received the treatments listed below:     .BOLD INDICATES ACTIVITIES PERFORMED / DISCUSSED     Leg press   Recumbent bike  Upright bike   UE ergometer  Treadmill   Elliptical        THERAPEUTIC EXERCISES to develop  strength, endurance, ROM, and flexibility for 30 minutes including   SUPINE  -SKC 5" hold x 15     -LTR 2x10         SIDELYING  -open book lumbar biased x15    PRONE  -sust exten 3'   -    STANDING.  -camel extension x10  -back bend x15      SITTING  -     NEUROMUSCULAR RE-EDUCATION activities to improve: Balance, Proprioception, motor control and stability  for 25 minutes. The following activities were included:    SUPINE  -pelvic tilts  x15  -ball " "squeeze / moisés abd hold 6" ea x 10   -bridge with clams (hip abd)  x15  -SI MARY in hook lying (hold hip flexion / opp foot push down into table)     SIDELYING  -MARY of the multifidus and obliques   -MARY of the psoas, multifidus, obliques    PRONE  -ball squeeze / moisés abd hold 6" ea x 10   -    STANDING.  -    SITTING  -     THERAPEUTIC ACTIVITIES to improve functional performance for -  minutes, including:  -     MANUAL THERAPY TECHNIQUES  were applied to the lumbar region for 0 minutes.  -cephalocaudad oscillations centrally and left unilateral. This was followed by MARY of the psoas and multifidus in SL position.       MODALITY      PATIENT EDUCATION AND HOME EXERCISES     Home Exercises Provided and Patient Education Provided     Education provided:   --Findings of evaluation and examination, and affect of these on plan for treatment  -Prognosis and expectations  -Home exercise program and expectations of therapy     Written Home Exercises Provided: no  ASSESSMENT     Siobhan completed her therapy as noted above in bold and had no difficulty with today's increase in reps on SKTC, LTR and back bends with no increase in symptoms prior to leaving the clinic.   Progress with stability training, core and trunk strengthening. No one movement exacerbates her pain.     Siobhan Is progressing towards her goals.   Pt prognosis is Good.     Pt will continue to benefit from skilled outpatient physical therapy to address the deficits listed in the problem list box on initial evaluation, provide pt/family education and to maximize pt's level of independence in the home and community environment.     Pt's spiritual, cultural and educational needs considered and pt agreeable to plan of care and goals.     Anticipated barriers to physical therapy: none     Goals:   Goals to be met:     Short Term GOALS: 5 weeks. Pt agrees with goals set.  1. Pts pain intensity decreased 20-40% for improved quality of life and functional mobilty. " ONGOING  2. Pt to demonstrate core activation with spinal stability during transitional movements for improved quality of movement. ONGOING  3. Pt to experience 50% or more reduced interruption of sleep due to reduced pain for improved quality of life. ONGOING  4. Patient demonstrates independence with HEP for self management . ONGOING  5. Patient demonstrates independence with Postural Awareness for control of pain.ONGOING  6. Pt demonstrates active hip extension without pain A/P to improve functional mobility. ONGOING     Long Term GOALS: 10 weeks. Pt agrees with goals set.  1. Patient demonstrates increased Lumbar extension rotation mobility to 30 degrees with 50% or greater  reduction of pain to improve functional mobility and tolerance to functional activities. ONGOING  2. Patient demonstrates increased active thoracic extension rotation mobility to 50 degrees to improve functional mobility and tolerance to functional activities. ONGOING  3. Pt demonstrates  [place] with body mechanics to control episodes of pain associated with daily ADL it to stand from a chair and getting out of bed to improve functional lifestyle. ONGOING  5. Pts pain level decreased to 75% or greater for with sit to stand  for restoring functional mobility and ADL. ONGOING    PLAN     Plan of care Certification: 8/20/2024 to 11/20/2024.     Outpatient Physical Therapy 2 times weekly for 10 weeks to include the following interventions: Manual Therapy, Neuromuscular Re-ed, Patient Education, Therapeutic Activities, and Therapeutic Exercise.     Monster Rosas, PTA

## 2024-09-25 NOTE — PATIENT INSTRUCTIONS
SI joint Muscle energy for L Posterior/R anterior rotation    Bring both knees up towards your chest as shown in the picture.  Place your Left hand on top of your Left thigh, and your Right hand on the back of your Right thigh.  Push your legs into each hand with about 50% effort.  Hold for 5 seconds. Repeat 3 times. Can be done in a seated position, pushing R foot into the floor.        Si Joint Muscle Energy Symphysis Pubis Reset    Bring both knees up towards your chest as shown in the picture.  Place your hands between your knees.  Squeeze your legs together and hold for 5 seconds.  Relax and then repeat 3 times.

## 2024-09-30 ENCOUNTER — TELEPHONE (OUTPATIENT)
Dept: OBSTETRICS AND GYNECOLOGY | Facility: CLINIC | Age: 34
End: 2024-09-30
Payer: COMMERCIAL

## 2024-09-30 ENCOUNTER — PATIENT MESSAGE (OUTPATIENT)
Dept: OBSTETRICS AND GYNECOLOGY | Facility: CLINIC | Age: 34
End: 2024-09-30
Payer: COMMERCIAL

## 2024-09-30 NOTE — TELEPHONE ENCOUNTER
----- Message from [place] sent at 9/30/2024 10:29 AM CDT -----  Type:  Call Back    Who Called:pt     Does the patient know what this is regarding?:Short Term Disability forms for extension   Would the patient rather a call back or a response via [facility]? Call   Best Call Back Number: [phone]  Additional Information:   99

## 2024-10-02 ENCOUNTER — CLINICAL SUPPORT (OUTPATIENT)
Dept: REHABILITATION | Facility: HOSPITAL | Age: 34
End: 2024-10-02
Payer: COMMERCIAL

## 2024-10-02 DIAGNOSIS — R52 DISTURBANCE OF SLEEP PATTERN ASSOCIATED WITH PAIN: ICD-10-CM

## 2024-10-02 DIAGNOSIS — M54.50 LUMBAR SPINE PAINFUL ON MOVEMENT: Primary | ICD-10-CM

## 2024-10-02 DIAGNOSIS — G47.9 DISTURBANCE OF SLEEP PATTERN ASSOCIATED WITH PAIN: ICD-10-CM

## 2024-10-02 DIAGNOSIS — M25.69 BACK STIFFNESS: ICD-10-CM

## 2024-10-02 PROCEDURE — 97112 NEUROMUSCULAR REEDUCATION: CPT | Mod: PO | Performed by: PHYSICAL THERAPIST

## 2024-10-02 PROCEDURE — 97110 THERAPEUTIC EXERCISES: CPT | Mod: PO | Performed by: PHYSICAL THERAPIST

## 2024-10-02 NOTE — PROGRESS NOTES
"[facility]   Physical Therapy Treatment Note     Name: Siobhan Chappell  Clinic Number: [id number]    Therapy Diagnosis:   Encounter Diagnoses   Name Primary?    Lumbar spine painful on movement Yes    Disturbance of sleep pattern associated with pain     Back stiffness      Physician: Luna Hernandez PA-C    Visit Date: 10/2/2024    Physician Orders: PT Eval and low back   Medical Diagnosis from Referral:   Chronic bilateral low back pain without sciatica [M54.50, G89.29]   Evaluation Date: 8/20/2024  Authorization Period Expiration: 7/26/2025  Plan of Care Expiration: 11/20/2024  Progress Note Due: 9/20/2024  Visit # / Visits authorized: 1/ 1, 4/20       Foto  Date / Visit# Score    #1/3 8/21/2024-1 37%   #2/3       #3/3         PTA Visit #: 1/5   Precautions: Standard    Time In: 9:10 am  Time Out: 1015 am  Total Billable Time: 65 minutes      SUBJECTIVE     Pt reports: the back is feeling fine but my knees are hurting more than the back. My back hurt last night but not this AM. Walked on the  last night. Usually walk in my neighborhood in [place].     She was not issued a home exercise program at   Response to previous treatment: That day continued to feel pain on the left side but felt fine the next day.     Functional change: ongoing    Pain: 3 /10  Location: left lower back        OBJECTIVE           TREATMENT        Siobhan received the treatments listed below:     .BOLD INDICATES ACTIVITIES PERFORMED / DISCUSSED     Leg press   Recumbent bike  Upright bike   UE ergometer  Treadmill   Elliptical        THERAPEUTIC EXERCISES to develop  strength, endurance, ROM, and flexibility for 20 minutes including   SUPINE  -SKC 5" hold x 15     -LTR 2x10         SIDELYING  -open book lumbar biased x15    PRONE  -sust exten 3'   -    STANDING.  -camel extension x10  -back bend x15      SITTING  -     NEUROMUSCULAR RE-EDUCATION activities to improve: Balance, Proprioception, motor " "control and stability  for 25 minutes. The following activities were included:    SUPINE  -pelvic tilts  x15  -ball squeeze / moisés abd hold 6" ea x 10   -bridge with clams (hip abd)  x15  -SI MARY in hook lying (hold hip flexion / opp foot push down into table)     SIDELYING  -MARY of the multifidus and obliques   -MARY of the psoas, multifidus, obliques    PRONE  -ball squeeze / moisés abd hold 6" ea x 10   -    STANDING.  -    SITTING  -     THERAPEUTIC ACTIVITIES to improve functional performance for -  minutes, including:  -     MANUAL THERAPY TECHNIQUES  were applied to the lumbar region for 0 minutes.  -cephalocaudad oscillations centrally and left unilateral. This was followed by MARY of the psoas and multifidus in SL position.       MODALITY      PATIENT EDUCATION AND HOME EXERCISES     Home Exercises Provided and Patient Education Provided     Education provided:   --Findings of evaluation and examination, and affect of these on plan for treatment  -Prognosis and expectations  -Home exercise program and expectations of therapy     Written Home Exercises Provided: no  ASSESSMENT     The patient was bale to perform al the activities noted without significant difficulty. She did not report pain at the end of the session in her low back. Demonstrated favorable technique. Required minimal direction. Progress with strengthening of the back and core musculature.     Siobhan Is progressing towards her goals.   Pt prognosis is Good.     Pt will continue to benefit from skilled outpatient physical therapy to address the deficits listed in the problem list box on initial evaluation, provide pt/family education and to maximize pt's level of independence in the home and community environment.     Pt's spiritual, cultural and educational needs considered and pt agreeable to plan of care and goals.     Anticipated barriers to physical therapy: none     Goals:   Goals to be met:     Short Term GOALS: 5 weeks. Pt agrees with goals " set.  1. Pts pain intensity decreased 20-40% for improved quality of life and functional mobilty. ONGOING  2. Pt to demonstrate core activation with spinal stability during transitional movements for improved quality of movement. ONGOING  3. Pt to experience 50% or more reduced interruption of sleep due to reduced pain for improved quality of life. ONGOING  4. Patient demonstrates independence with HEP for self management . ONGOING  5. Patient demonstrates independence with Postural Awareness for control of pain.ONGOING  6. Pt demonstrates active hip extension without pain A/P to improve functional mobility. ONGOING     Long Term GOALS: 10 weeks. Pt agrees with goals set.  1. Patient demonstrates increased Lumbar extension rotation mobility to 30 degrees with 50% or greater  reduction of pain to improve functional mobility and tolerance to functional activities. ONGOING  2. Patient demonstrates increased active thoracic extension rotation mobility to 50 degrees to improve functional mobility and tolerance to functional activities. ONGOING  3. Pt demonstrates  [place] with body mechanics to control episodes of pain associated with daily ADL it to stand from a chair and getting out of bed to improve functional lifestyle. ONGOING  5. Pts pain level decreased to 75% or greater for with sit to stand  for restoring functional mobility and ADL. ONGOING    PLAN     Plan of care Certification: 8/20/2024 to 11/20/2024.     Outpatient Physical Therapy 2 times weekly for 10 weeks to include the following interventions: Manual Therapy, Neuromuscular Re-ed, Patient Education, Therapeutic Activities, and Therapeutic Exercise.     Jerry Silva, PT

## 2024-10-07 ENCOUNTER — TELEPHONE (OUTPATIENT)
Dept: OBSTETRICS AND GYNECOLOGY | Facility: CLINIC | Age: 34
End: 2024-10-07
Payer: COMMERCIAL

## 2024-10-07 ENCOUNTER — CLINICAL SUPPORT (OUTPATIENT)
Dept: REHABILITATION | Facility: HOSPITAL | Age: 34
End: 2024-10-07
Payer: COMMERCIAL

## 2024-10-07 DIAGNOSIS — M54.50 LUMBAR SPINE PAINFUL ON MOVEMENT: Primary | ICD-10-CM

## 2024-10-07 DIAGNOSIS — M25.69 BACK STIFFNESS: ICD-10-CM

## 2024-10-07 DIAGNOSIS — R52 DISTURBANCE OF SLEEP PATTERN ASSOCIATED WITH PAIN: ICD-10-CM

## 2024-10-07 DIAGNOSIS — G47.9 DISTURBANCE OF SLEEP PATTERN ASSOCIATED WITH PAIN: ICD-10-CM

## 2024-10-07 PROCEDURE — 97112 NEUROMUSCULAR REEDUCATION: CPT | Mod: PO | Performed by: PHYSICAL THERAPIST

## 2024-10-07 NOTE — TELEPHONE ENCOUNTER
Notified pt paperwork is on dr sandip salvador and will call once completed. Pt verbalized understanding

## 2024-10-07 NOTE — TELEPHONE ENCOUNTER
----- Message from Marsha sent at 10/7/2024  3:32 PM CDT -----  Type:  Needs Medical Advice    Who Called:  pt     Would the patient rather a call back or a response via [facility]?  Call back   Best Call Back Number: [phone]  Additional Information:  pt is calling to f/u on the paper work that was sent on Last Monday the form was a mental Health report  and it is needs to get completed  by this Wednesday 10/09/24

## 2024-10-07 NOTE — PROGRESS NOTES
"[facility]   Physical Therapy Treatment Note     Name: Siobhan Chappell  Clinic Number: [id number]    Therapy Diagnosis:   Encounter Diagnoses   Name Primary?    Lumbar spine painful on movement Yes    Disturbance of sleep pattern associated with pain     Back stiffness      Physician: Luna Hernandez PA-C    Visit Date: 10/7/2024    Physician Orders: PT Eval and low back   Medical Diagnosis from Referral:   Chronic bilateral low back pain without sciatica [M54.50, G89.29]   Evaluation Date: 8/20/2024  Authorization Period Expiration: 7/26/2025  Plan of Care Expiration: 11/20/2024  Progress Note Due: 9/20/2024  Visit # / Visits authorized: 1/ 1, 4/20       Foto  Date / Visit# Score    #1/3 8/21/2024-1 37%   #2/3       #3/3         PTA Visit #: 1/5   Precautions: Standard    Time In: 12:10   Time Out: 1315   Total Billable Time: 65 minutes      SUBJECTIVE     Pt reports: the back is feeling fine. There is not any pain presently. The pain come and goes.      She was not issued a home exercise program at   Response to previous treatment: Did fine after the last session.   Functional change: ongoing    Pain: 0 /10  Location: left lower back        OBJECTIVE           TREATMENT        Siobhan received the treatments listed below:     .BOLD INDICATES ACTIVITIES PERFORMED / DISCUSSED     Leg press   Recumbent bike  Upright bike   UE ergometer  Treadmill   Elliptical        THERAPEUTIC EXERCISES to develop  strength, endurance, ROM, and flexibility for 20 minutes including   SUPINE  -SKC 5" hold x 15     -LTR 2x10         SIDELYING  -open book lumbar biased x15    PRONE  -sust exten 3'   -    STANDING.  -camel extension x10  -back bend x15      SITTING  -     NEUROMUSCULAR RE-EDUCATION activities to improve: Balance, Proprioception, motor control and stability  for 40 minutes. The following activities were included.    SUPINE  -pelvic tilts  x15  -ball squeeze / moisés abd hold 6" ea x 10 " "  -bridge with clams (hip abd)  x15  -SI MARY in hook lying (hold hip flexion / opp foot push down into table)     SIDELYING  -MARY of the multifidus and obliques   -MARY of the psoas, multifidus, obliques    PRONE  -ball squeeze / moisés abd hold 6" ea x 10   -leg lifts x15  -trunk raise x15   -swimmers x15     STANDING.  -    SITTING  -     THERAPEUTIC ACTIVITIES to improve functional performance for -  minutes, including:  -     MANUAL THERAPY TECHNIQUES  were applied to the lumbar region for 0 minutes.  -cephalocaudad oscillations centrally and left unilateral. This was followed by MARY of the psoas and multifidus in SL position.       MODALITY      PATIENT EDUCATION AND HOME EXERCISES     Home Exercises Provided and Patient Education Provided     Education provided:   --Findings of evaluation and examination, and affect of these on plan for treatment  -Prognosis and expectations  -Home exercise program and expectations of therapy     Written Home Exercises Provided: no  ASSESSMENT     Thew exercise routine was performed without significant challenges. She is progressing well. Pain seems to be under control. Progress with trunk and core strengthening.      Siobhan Is progressing towards her goals.   Pt prognosis is Good.     Pt will continue to benefit from skilled outpatient physical therapy to address the deficits listed in the problem list box on initial evaluation, provide pt/family education and to maximize pt's level of independence in the home and community environment.     Pt's spiritual, cultural and educational needs considered and pt agreeable to plan of care and goals.     Anticipated barriers to physical therapy: none     Goals:   Goals to be met:     Short Term GOALS: 5 weeks. Pt agrees with goals set.  1. Pts pain intensity decreased 20-40% for improved quality of life and functional mobilty. ONGOING  2. Pt to demonstrate core activation with spinal stability during transitional movements for improved " quality of movement. ONGOING  3. Pt to experience 50% or more reduced interruption of sleep due to reduced pain for improved quality of life. ONGOING  4. Patient demonstrates independence with HEP for self management . ONGOING  5. Patient demonstrates independence with Postural Awareness for control of pain.ONGOING  6. Pt demonstrates active hip extension without pain A/P to improve functional mobility. ONGOING     Long Term GOALS: 10 weeks. Pt agrees with goals set.  1. Patient demonstrates increased Lumbar extension rotation mobility to 30 degrees with 50% or greater  reduction of pain to improve functional mobility and tolerance to functional activities. ONGOING  2. Patient demonstrates increased active thoracic extension rotation mobility to 50 degrees to improve functional mobility and tolerance to functional activities. ONGOING  3. Pt demonstrates  [place] with body mechanics to control episodes of pain associated with daily ADL it to stand from a chair and getting out of bed to improve functional lifestyle. ONGOING  5. Pts pain level decreased to 75% or greater for with sit to stand  for restoring functional mobility and ADL. ONGOING    PLAN     Plan of care Certification: 8/20/2024 to 11/20/2024.     Outpatient Physical Therapy 2 times weekly for 10 weeks to include the following interventions: Manual Therapy, Neuromuscular Re-ed, Patient Education, Therapeutic Activities, and Therapeutic Exercise.     Jerry Silva, PT

## 2024-10-14 ENCOUNTER — CLINICAL SUPPORT (OUTPATIENT)
Dept: REHABILITATION | Facility: HOSPITAL | Age: 34
End: 2024-10-14
Payer: COMMERCIAL

## 2024-10-14 DIAGNOSIS — M25.69 BACK STIFFNESS: ICD-10-CM

## 2024-10-14 DIAGNOSIS — G47.9 DISTURBANCE OF SLEEP PATTERN ASSOCIATED WITH PAIN: ICD-10-CM

## 2024-10-14 DIAGNOSIS — R52 DISTURBANCE OF SLEEP PATTERN ASSOCIATED WITH PAIN: ICD-10-CM

## 2024-10-14 DIAGNOSIS — M54.50 LUMBAR SPINE PAINFUL ON MOVEMENT: Primary | ICD-10-CM

## 2024-10-14 PROCEDURE — 97112 NEUROMUSCULAR REEDUCATION: CPT | Mod: PO | Performed by: PHYSICAL THERAPIST

## 2024-10-14 PROCEDURE — 97110 THERAPEUTIC EXERCISES: CPT | Mod: PO | Performed by: PHYSICAL THERAPIST

## 2024-10-14 NOTE — PROGRESS NOTES
"[facility]   Physical Therapy Treatment Note     Name: Siobhan Chappell  Clinic Number: [id number]    Therapy Diagnosis:   Encounter Diagnoses   Name Primary?    Lumbar spine painful on movement Yes    Disturbance of sleep pattern associated with pain     Back stiffness      Physician: Luna Hernandez PA-C    Visit Date: 10/14/2024    Physician Orders: PT Eval and low back   Medical Diagnosis from Referral:   Chronic bilateral low back pain without sciatica [M54.50, G89.29]   Evaluation Date: 8/20/2024  Authorization Period Expiration: 7/26/2025  Plan of Care Expiration: 11/20/2024  Progress Note Due: 9/20/2024  Visit # / Visits authorized: 1/ 1, 6 /20       Foto  Date / Visit# Score    #1/3 8/21/2024-1 37%   #2/3 10/14/2024-6   63%   #3/3         PTA Visit #: 1/5   Precautions: Standard    Time In: 1105   Time Out: 1205  Total Billable Time: 60 minutes      SUBJECTIVE     Pt reports: the back is feeling pretty good today.     She was not issued a home exercise program at   Response to previous treatment: Did fine after the last session.   Functional change: not limited with functional activities   Pain: 0 /10  Location: left lower back        OBJECTIVE           TREATMENT        Siobhan received the treatments listed below:     .BOLD INDICATES ACTIVITIES PERFORMED / DISCUSSED     Leg press   Recumbent bike  Upright bike   UE ergometer  Treadmill   Elliptical        THERAPEUTIC EXERCISES to develop  strength, endurance, ROM, and flexibility for 25 minutes including   SUPINE  -SKC 5" hold x 15     -LTR 2x10         SIDELYING  -open book lumbar biased x15    PRONE  -sust exten 3'   -    STANDING.  -camel extension x10  -back bend x15      SITTING  -     NEUROMUSCULAR RE-EDUCATION activities to improve: Balance, Proprioception, motor control and stability  for 40 minutes. The following activities were included.    SUPINE  -pelvic tilts  x15  -ball squeeze / moisés abd hold 6" ea x 10 " "  -bridge with clams (hip abd)  x15  -SI MARY in hook lying (hold hip flexion / opp foot push down into table)     SIDELYING  -MARY of the multifidus and obliques   -MARY of the psoas, multifidus, obliques    PRONE  -ball squeeze / moisés abd hold 6" ea x 10   -leg lifts x15  -trunk raise x15   -swimmers x15     STANDING.  -    SITTING  -     THERAPEUTIC ACTIVITIES to improve functional performance for -  minutes, including:  -     MANUAL THERAPY TECHNIQUES  were applied to the lumbar region for 0 minutes.  -cephalocaudad oscillations centrally and left unilateral. This was followed by MARY of the psoas and multifidus in SL position.       MODALITY      PATIENT EDUCATION AND HOME EXERCISES     Home Exercises Provided and Patient Education Provided     Education provided:   --Findings of evaluation and examination, and affect of these on plan for treatment  -Prognosis and expectations  -Home exercise program and expectations of therapy     Written Home Exercises Provided: no  ASSESSMENT     Siobhan performed the routine noted without any significant difficulty. She did not present with any signs of pain associated with the exercises.  Progressing well and not functionally limited. Discussed reducing sessions to x1 a week. Will provide with HEP at the next session.         Siobhan Is progressing towards her goals.   Pt prognosis is Good.     Pt will continue to benefit from skilled outpatient physical therapy to address the deficits listed in the problem list box on initial evaluation, provide pt/family education and to maximize pt's level of independence in the home and community environment.     Pt's spiritual, cultural and educational needs considered and pt agreeable to plan of care and goals.     Anticipated barriers to physical therapy: none     Goals:   Goals to be met:     Short Term GOALS: 5 weeks. Pt agrees with goals set.  1. Pts pain intensity decreased 20-40% for improved quality of life and functional mobilty. " ONGOING  2. Pt to demonstrate core activation with spinal stability during transitional movements for improved quality of movement. ONGOING  3. Pt to experience 50% or more reduced interruption of sleep due to reduced pain for improved quality of life. ONGOING  4. Patient demonstrates independence with HEP for self management . ONGOING  5. Patient demonstrates independence with Postural Awareness for control of pain.ONGOING  6. Pt demonstrates active hip extension without pain A/P to improve functional mobility. ONGOING     Long Term GOALS: 10 weeks. Pt agrees with goals set.  1. Patient demonstrates increased Lumbar extension rotation mobility to 30 degrees with 50% or greater  reduction of pain to improve functional mobility and tolerance to functional activities. ONGOING  2. Patient demonstrates increased active thoracic extension rotation mobility to 50 degrees to improve functional mobility and tolerance to functional activities. ONGOING  3. Pt demonstrates  [place] with body mechanics to control episodes of pain associated with daily ADL it to stand from a chair and getting out of bed to improve functional lifestyle. ONGOING  5. Pts pain level decreased to 75% or greater for with sit to stand  for restoring functional mobility and ADL. ONGOING    PLAN     Plan of care Certification: 8/20/2024 to 11/20/2024.     Outpatient Physical Therapy 2 times weekly for 10 weeks to include the following interventions: Manual Therapy, Neuromuscular Re-ed, Patient Education, Therapeutic Activities, and Therapeutic Exercise.     Jerry Silva, PT

## 2024-10-15 ENCOUNTER — CLINICAL SUPPORT (OUTPATIENT)
Dept: PSYCHIATRY | Facility: CLINIC | Age: 34
End: 2024-10-15
Payer: COMMERCIAL

## 2024-10-15 DIAGNOSIS — Z63.0 RELATIONSHIP PROBLEM WITH BOYFRIEND: ICD-10-CM

## 2024-10-15 DIAGNOSIS — F41.1 GAD (GENERALIZED ANXIETY DISORDER): ICD-10-CM

## 2024-10-15 DIAGNOSIS — F32.1 MDD (MAJOR DEPRESSIVE DISORDER), SINGLE EPISODE, MODERATE: Primary | ICD-10-CM

## 2024-10-15 PROCEDURE — 90837 PSYTX W PT 60 MINUTES: CPT | Mod: S$GLB,,, | Performed by: CASE MANAGER/CARE COORDINATOR

## 2024-10-15 SDOH — SOCIAL DETERMINANTS OF HEALTH (SDOH): PROBLEMS IN RELATIONSHIP WITH SPOUSE OR PARTNER: Z63.0

## 2024-10-22 ENCOUNTER — CLINICAL SUPPORT (OUTPATIENT)
Dept: PSYCHIATRY | Facility: CLINIC | Age: 34
End: 2024-10-22
Payer: COMMERCIAL

## 2024-10-22 DIAGNOSIS — Z63.0 RELATIONSHIP PROBLEM WITH BOYFRIEND: ICD-10-CM

## 2024-10-22 DIAGNOSIS — F41.1 GAD (GENERALIZED ANXIETY DISORDER): ICD-10-CM

## 2024-10-22 DIAGNOSIS — F32.1 MDD (MAJOR DEPRESSIVE DISORDER), SINGLE EPISODE, MODERATE: Primary | ICD-10-CM

## 2024-10-22 PROCEDURE — 90837 PSYTX W PT 60 MINUTES: CPT | Mod: S$GLB,,, | Performed by: CASE MANAGER/CARE COORDINATOR

## 2024-10-22 SDOH — SOCIAL DETERMINANTS OF HEALTH (SDOH): PROBLEMS IN RELATIONSHIP WITH SPOUSE OR PARTNER: Z63.0

## 2024-10-29 ENCOUNTER — CLINICAL SUPPORT (OUTPATIENT)
Dept: PSYCHIATRY | Facility: CLINIC | Age: 34
End: 2024-10-29
Payer: COMMERCIAL

## 2024-10-29 DIAGNOSIS — Z63.0 RELATIONSHIP PROBLEM WITH BOYFRIEND: ICD-10-CM

## 2024-10-29 DIAGNOSIS — F41.1 GAD (GENERALIZED ANXIETY DISORDER): ICD-10-CM

## 2024-10-29 DIAGNOSIS — F32.1 MDD (MAJOR DEPRESSIVE DISORDER), SINGLE EPISODE, MODERATE: Primary | ICD-10-CM

## 2024-10-29 PROCEDURE — 90837 PSYTX W PT 60 MINUTES: CPT | Mod: S$GLB,,, | Performed by: CASE MANAGER/CARE COORDINATOR

## 2024-10-29 SDOH — SOCIAL DETERMINANTS OF HEALTH (SDOH): PROBLEMS IN RELATIONSHIP WITH SPOUSE OR PARTNER: Z63.0

## 2024-11-03 NOTE — PROGRESS NOTES
Individual Psychotherapy (PhD/LCSW)     10/22/2024     Site:  [facility]          Therapeutic Intervention: Met with patient.  Outpatient - Behavior modifying psychotherapy 60 min - CPT code 97244 and Outpatient - Interactive psychotherapy 60 min - CPT code 62984     Chief complaint/reason for encounter: depression, anxiety, behavior, and interpersonal       Interval history and content of current session: Pt presented with bright affect, alert, and oriented for session. Pt has been walking more daily. Pt brought phone stats to session to show therapist her progress with her steps. Pt reports exploring being in an open relationship, and receiving the support she needs from her new partner. Pt denies any current thoughts of SI/HI or self harming behaviors.    [facility], Session 3 (Challenging Unhelpful Thinking)  Session Focus:  Brief check-in  Set agenda  Collect rating scales       [facility] Gad7    Question 10/22/2024  2:06 PM CST - Filed by Henrietta Chaidez LPC   Feeling nervous, anxious, on edge More than half the days   Not being able to stop or control worrying More than half the days   Worrying too much about different things More than half the days   Trouble relaxing Several days   Being so restless that its hard to sit still Several days   Becoming easily annoyed or irritable Several days   Feeling afraid as if something awful might happen Nearly everyday   If you marked you are experiencing any of the aforementioned problems, how difficult have these made it for you to do your work, take care of things at home, or get along with other people? Very difficult   TOTAL SCORE: (range: 0 - 21) 12     Oh[facility] Peq Phq8    Question 10/22/2024  2:08 PM CST - Filed by Henrietta Chaidez LPC   1. Little interest or pleasure in doing things Several days   2. Feeling down, depressed, or hopeless Several days   3. Trouble falling or staying asleep, or sleeping too much Nearly every day   4. Feeling tired or  having little energy Several days   5. poor appetite or overeating Several days   6. Feeling bad about yourself - or that you are a failure or have let yourself or your family down Nearly every day   7. Trouble concentrating on things, such as reading the newspaper or watching television Several days   8. Moving or speaking so slowly that other people could have noticed? Or the opposite - being so fidgety or restless that you have been moving around a lot more than usual Several days   Total Score  (range: 0 - 24) 12     Review action plan  Review Treatment Plan and Values/Aspirations as needed  Identify intermediate and core beliefs, Challenging questions, Alternative thoughts  Intervention techniques: Cognitive restructuring  Summarize session  Feedback about session    Action Plan:  Review therapy materials  Intervention practice:  · Complete columns #1-3 of the Thought Record every day   Ways to overcome obstacles: Ask for help from friend        Treatment plan:  Target symptoms: depression, anxiety , adjustment, work stress  Why chosen therapy is appropriate versus another modality: relevant to diagnosis, patient responds to this modality, evidence based practice  Outcome monitoring methods: self-report, observation, checklist/rating scale  Therapeutic intervention type: insight oriented psychotherapy, behavior modifying psychotherapy, supportive psychotherapy, interactive psychotherapy     Risk parameters:  Patient reports no suicidal ideation  Patient reports no homicidal ideation  Patient reports no self-injurious behavior  Patient reports no violent behavior     Verbal deficits: None     Patient's response to intervention:  The patient's response to intervention is accepting.     Progress toward goals and other mental status changes:  The patient's progress toward goals is not progressing.     Diagnosis:       ICD-10-CM ICD-9-CM   1. MDD (major depressive disorder), single episode, moderate  F32.1 296.22    2. ORESTES (generalized anxiety disorder)  F41.1 300.02   3. Relationship problem with boyfriend  Z63.0 V62.81         Plan:  individual psychotherapy     Return to clinic: 1 week     Length of Service (minutes): 59

## 2024-11-03 NOTE — PROGRESS NOTES
"Individual Psychotherapy (PhD/LCSW)    9/20/2024    Site:  [facility]         Therapeutic Intervention: Met with patient.  Outpatient - Behavior modifying psychotherapy 60 min - CPT code 59621 and Outpatient - Interactive psychotherapy 60 min - CPT code 43153    Chief complaint/reason for encounter: depression, anxiety, behavior, and interpersonal     Interval history and content of current session: Pt presented with bright affect, alert, and oriented for session. 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 Pt also reports high conflict with her fiance and financial hardship. Pt nervous about being vulnerable and seeking help. Pt has had past fleeting thoughts of "not wanting to be here," but does not want to harm self.    [facility], Session 1 (Treatment Initiation)  Session Focus:  Brief check-in  Set agenda  Collect rating scales     Ohs Peq Telepsych Gad7    Question 9/20/2024     Feeling nervous, anxious, on edge Nearly everyday   Not being able to stop or control worrying Nearly everyday   Worrying too much about different things Nearly everyday   Trouble relaxing Several days   Being so restless that its hard to sit still Several days   Becoming easily annoyed or irritable More than half the days   Feeling afraid as if something awful might happen Nearly everyday   If you marked you are experiencing any of the aforementioned problems, how difficult have these made it for you to do your work, take care of things at home, or get along with other people? Extremely difficult   TOTAL SCORE: (range: 0 - 21) 16     Ohs Peq Phq8    Question 9/20/2024     1. Little interest or pleasure in doing things Nearly every day   2. Feeling down, depressed, or hopeless Nearly every day   3. Trouble falling or staying asleep, or sleeping too much Nearly every day   4. Feeling tired or having little energy Nearly every day   5. poor appetite or overeating Nearly every day   6. Feeling bad " about yourself - or that you are a failure or have let yourself or your family down Nearly every day   7. Trouble concentrating on things, such as reading the newspaper or watching television Nearly every day   8. Moving or speaking so slowly that other people could have noticed? Or the opposite - being so fidgety or restless that you have been moving around a lot more than usual More than half the days   Total Score  (range: 0 - 24) 23     Introduction to Therapy and CBT  Treatment Plan/Goals  Review Values and Aspirations  Address patient concerns  Summarize session  Feedback about session    Action Plan:  Review therapy materials  Intervention practice:  · Use Cognitive-Behavioral Model Diagram for 3 situations  · Complete the Bridging Sessions worksheet for Session 2  Ways to overcome obstacles: Ask for help from friend        09/20/2024   Overall Treatment Plan: _____   Values and Aspirations: Client values her two kids and mom, her alone time, personal space, being at home, love, comfort, and having a sound mind. Pt aspires to be better for herself, her kids, and better for potential healthy relationships with others.   Problem List / Patients Goals and Evidence-Based Interventions (include up to 5):    Problem #1: Resentment/Guilt with Partner/Self  Goal: Learn techniques to improve conversations, improve sense of self/esteem  Therapy Interventions: Unraveling cognitive distortions, cognitive restructuring, radical acceptance, boundary setting  Non-Therapy Strategies: Walking and listening to uplifting music   Problem #2: Managing emotions poorly  Goal: Learning techniques to stop shutting down, being paralyzed mentally, and decreasing over productivity  Therapy Interventions: Psycho-ed on emotions, distress tolerance skills, Cognitive restructuring, Guided discovery, exposure    Non-Therapy Strategies: Hang out with friends   Problem #3: Procrastination  Goal: Develop a structured routine for daily  use  Therapy Interventions: Activity scheduling and behavior activation  Non-Therapy Strategies: Consistently implementing routine   Problem #4: Vulnerability  Goal: Talk more about self with her friends/partner  Therapy Interventions: Supportive Therapy, Cognitive restructuring  Non-Therapy Strategies: Spending time with loved ones       Treatment plan:  Target symptoms: depression, anxiety , adjustment, work stress  Why chosen therapy is appropriate versus another modality: relevant to diagnosis, patient responds to this modality, evidence based practice  Outcome monitoring methods: self-report, observation, checklist/rating scale  Therapeutic intervention type: insight oriented psychotherapy, behavior modifying psychotherapy, supportive psychotherapy, interactive psychotherapy    Risk parameters:  Patient reports no suicidal ideation  Patient reports no homicidal ideation  Patient reports no self-injurious behavior  Patient reports no violent behavior    Verbal deficits: None    Patient's response to intervention:  The patient's response to intervention is accepting.    Progress toward goals and other mental status changes:  The patient's progress toward goals is not progressing.    Diagnosis:     ICD-10-CM ICD-9-CM   1. MDD (major depressive disorder), single episode, moderate  F32.1 296.22   2. ORESTES (generalized anxiety disorder)  F41.1 300.02   3. Relationship problem with boyfriend  Z63.0 V62.81       Plan:  individual psychotherapy    Return to clinic: 1 week    Length of Service (minutes): 60

## 2024-11-03 NOTE — PROGRESS NOTES
"Individual Psychotherapy (PhD/LCSW)     10/15/2024     Site:  [facility]          Therapeutic Intervention: Met with patient.  Outpatient - Behavior modifying psychotherapy 60 min - CPT code 98790 and Outpatient - Interactive psychotherapy 60 min - CPT code 74230     Chief complaint/reason for encounter: depression, anxiety, behavior, and interpersonal       Interval history and content of current session: Pt presented with bright affect, alert, and oriented for session. Pt has not been in the mood to do anything in her home. "I haven't even spent much time with my kids."  Pt reports disagreement with her partner due to him trying to force her to "get better" and go back to work soon.      [facility], Session 2 (Identifying Unhelpful Thinking)  Session Focus:  Brief check-in  Set agenda  Collect rating scales _____      Ohs Peq Telepsych Gad7    Question 10/8/2024  7:22 PM CST - Filed by Patient   Feeling nervous, anxious, on edge More than half the days   Not being able to stop or control worrying Nearly everyday   Worrying too much about different things Nearly everyday   Trouble relaxing More than half the days   Being so restless that its hard to sit still More than half the days   Becoming easily annoyed or irritable Several days   Feeling afraid as if something awful might happen More than half the days   If you marked you are experiencing any of the aforementioned problems, how difficult have these made it for you to do your work, take care of things at home, or get along with other people? Extremely difficult   TOTAL SCORE: (range: 0 - 21) 15     Ohs Peq Phq8    Question 10/8/2024  7:23 PM CST - Filed by Patient   1. Little interest or pleasure in doing things Nearly every day   2. Feeling down, depressed, or hopeless Nearly every day   3. Trouble falling or staying asleep, or sleeping too much Nearly every day   4. Feeling tired or having little energy More than half the days   5. poor appetite or " overeating Not at all   6. Feeling bad about yourself - or that you are a failure or have let yourself or your family down More than half the days   7. Trouble concentrating on things, such as reading the newspaper or watching television More than half the days   8. Moving or speaking so slowly that other people could have noticed? Or the opposite - being so fidgety or restless that you have been moving around a lot more than usual More than half the days   Total Score  (range: 0 - 24) 17     Review action plan  Review Treatment Plan and Values/Aspirations as needed  Identify unhelpful thinking, Unhelpful thinking patterns  Intervention techniques: Cognitive restructuring  Summarize session  Feedback about session    Action Plan:  Review therapy materials  Intervention practice:  · Use the triangle diagram for 5 situations  · Keep a record of automatic thought   Ways to overcome obstacles: Ask for help from friend        Treatment plan:  Target symptoms: depression, anxiety , adjustment, work stress  Why chosen therapy is appropriate versus another modality: relevant to diagnosis, patient responds to this modality, evidence based practice  Outcome monitoring methods: self-report, observation, checklist/rating scale  Therapeutic intervention type: insight oriented psychotherapy, behavior modifying psychotherapy, supportive psychotherapy, interactive psychotherapy     Risk parameters:  Patient reports no suicidal ideation  Patient reports no homicidal ideation  Patient reports no self-injurious behavior  Patient reports no violent behavior     Verbal deficits: None     Patient's response to intervention:  The patient's response to intervention is accepting.     Progress toward goals and other mental status changes:  The patient's progress toward goals is not progressing.     Diagnosis:       ICD-10-CM ICD-9-CM   1. MDD (major depressive disorder), single episode, moderate  F32.1 296.22   2. ORESTES (generalized anxiety  disorder)  F41.1 300.02   3. Relationship problem with boyfriend  Z63.0 V62.81         Plan:  individual psychotherapy     Return to clinic: 1 week     Length of Service (minutes): 60

## 2024-11-03 NOTE — PROGRESS NOTES
"Individual Psychotherapy (PhD/LCSW)     10/29/2024     Site:  [facility]          Therapeutic Intervention: Met with patient.  Outpatient - Behavior modifying psychotherapy 60 min - CPT code 70662 and Outpatient - Interactive psychotherapy 60 min - CPT code 74477     Chief complaint/reason for encounter: depression, anxiety, behavior, and interpersonal       Interval history and content of current session: Pt presented with bright affect, alert, and oriented for session. Pt has been walking more daily, and spending more time with her second partner. "I still have a loss of interest, but I'm making myself go out." Pt reports she attended her son's basketball game recently as well. "I did it for him." Pt denies any current thoughts of SI/HI or self harming behaviors.    [facility], Session 4 (Challenging Unhelpful Thinking, Part 2)  Session Focus:  Brief check-in  Set agenda  Collect rating scales     Ohs Peq Telepsych Gad7    Question 10/29/2024  2:06 PM CST - Filed by Henrietta Chaidez LPC   Feeling nervous, anxious, on edge Nearly everyday   Not being able to stop or control worrying Nearly everyday   Worrying too much about different things Nearly everyday   Trouble relaxing Several days   Being so restless that its hard to sit still Several days   Becoming easily annoyed or irritable Not at all   Feeling afraid as if something awful might happen Several days   If you marked you are experiencing any of the aforementioned problems, how difficult have these made it for you to do your work, take care of things at home, or get along with other people? Somewhat difficult   TOTAL SCORE: (range: 0 - 21) 12     Ohs Peq Phq8    Question 10/29/2024  2:08 PM CST - Filed by Henrietta Chaidez LPC   1. Little interest or pleasure in doing things Nearly every day   2. Feeling down, depressed, or hopeless More than half the days   3. Trouble falling or staying asleep, or sleeping too much Nearly every day   4. Feeling " tired or having little energy Nearly every day   5. poor appetite or overeating Nearly every day   6. Feeling bad about yourself - or that you are a failure or have let yourself or your family down Nearly every day   7. Trouble concentrating on things, such as reading the newspaper or watching television Nearly every day   8. Moving or speaking so slowly that other people could have noticed? Or the opposite - being so fidgety or restless that you have been moving around a lot more than usual Nearly every day   Total Score  (range: 0 - 24) 23     Review action plan  Review Treatment Plan and Values/Aspirations as needed  Review Thought Records  Intervention techniques: Cognitive restructuring  Summarize session  Feedback about session    Action Plan:  Review therapy materials  Intervention practice:  · Complete one Thought Record every day   Ways to overcome obstacles: Ask for help from friend        Treatment plan:  Target symptoms: depression, anxiety , adjustment, work stress  Why chosen therapy is appropriate versus another modality: relevant to diagnosis, patient responds to this modality, evidence based practice  Outcome monitoring methods: self-report, observation, checklist/rating scale  Therapeutic intervention type: insight oriented psychotherapy, behavior modifying psychotherapy, supportive psychotherapy, interactive psychotherapy     Risk parameters:  Patient reports no suicidal ideation  Patient reports no homicidal ideation  Patient reports no self-injurious behavior  Patient reports no violent behavior     Verbal deficits: None     Patient's response to intervention:  The patient's response to intervention is accepting.     Progress toward goals and other mental status changes:  The patient's progress toward goals is not progressing.     Diagnosis:       ICD-10-CM ICD-9-CM   1. MDD (major depressive disorder), single episode, moderate  F32.1 296.22   2. ORESTES (generalized anxiety disorder)  F41.1 300.02    3. Relationship problem with boyfriend  Z63.0 V62.81         Plan:  individual psychotherapy     Return to clinic: 1 week     Length of Service (minutes): 62

## 2024-11-05 ENCOUNTER — CLINICAL SUPPORT (OUTPATIENT)
Dept: PSYCHIATRY | Facility: CLINIC | Age: 34
End: 2024-11-05
Payer: COMMERCIAL

## 2024-11-05 DIAGNOSIS — F32.1 MDD (MAJOR DEPRESSIVE DISORDER), SINGLE EPISODE, MODERATE: Primary | ICD-10-CM

## 2024-11-05 DIAGNOSIS — Z63.0 RELATIONSHIP PROBLEM WITH BOYFRIEND: ICD-10-CM

## 2024-11-05 DIAGNOSIS — F41.1 GAD (GENERALIZED ANXIETY DISORDER): ICD-10-CM

## 2024-11-05 PROCEDURE — 90837 PSYTX W PT 60 MINUTES: CPT | Mod: S$GLB,,, | Performed by: CASE MANAGER/CARE COORDINATOR

## 2024-11-05 SDOH — SOCIAL DETERMINANTS OF HEALTH (SDOH): PROBLEMS IN RELATIONSHIP WITH SPOUSE OR PARTNER: Z63.0

## 2024-11-12 ENCOUNTER — CLINICAL SUPPORT (OUTPATIENT)
Dept: PSYCHIATRY | Facility: CLINIC | Age: 34
End: 2024-11-12
Payer: COMMERCIAL

## 2024-11-12 DIAGNOSIS — F32.1 MDD (MAJOR DEPRESSIVE DISORDER), SINGLE EPISODE, MODERATE: Primary | ICD-10-CM

## 2024-11-12 DIAGNOSIS — Z63.0 RELATIONSHIP PROBLEM WITH BOYFRIEND: ICD-10-CM

## 2024-11-12 DIAGNOSIS — F41.1 GAD (GENERALIZED ANXIETY DISORDER): ICD-10-CM

## 2024-11-12 PROCEDURE — 90837 PSYTX W PT 60 MINUTES: CPT | Mod: S$GLB,,, | Performed by: CASE MANAGER/CARE COORDINATOR

## 2024-11-12 SDOH — SOCIAL DETERMINANTS OF HEALTH (SDOH): PROBLEMS IN RELATIONSHIP WITH SPOUSE OR PARTNER: Z63.0

## 2024-11-14 NOTE — PROGRESS NOTES
Individual Psychotherapy (PhD/LCSW)     11/12/2024     Site:  [facility]          Therapeutic Intervention: Met with patient.  Outpatient - Behavior modifying psychotherapy 60 min - CPT code 56915 and Outpatient - Interactive psychotherapy 60 min - CPT code 96543     Chief complaint/reason for encounter: depression, anxiety, behavior, and interpersonal       Interval history and content of current session: Pt presented with bright affect, alert, and oriented for session. Pt still utilizing healthy coping, and still spending more time with her second partner. Pt is more anxious because she's going back to work early on tomorrow versus another month. Pt denies any current thoughts of SI/HI or self harming behaviors.     [facility], Session 6 (Relaxation)  Session Focus:  Brief check-in  Set agenda  Collect rating scales _____     Ohs Peq Telepsych Gad7    Question 11/12/2024  1:46 PM CST - Filed by Patient   Feeling nervous, anxious, on edge Several days   Not being able to stop or control worrying Several days   Worrying too much about different things Several days   Trouble relaxing Several days   Being so restless that its hard to sit still Several days   Becoming easily annoyed or irritable Several days   Feeling afraid as if something awful might happen Several days   If you marked you are experiencing any of the aforementioned problems, how difficult have these made it for you to do your work, take care of things at home, or get along with other people? Very difficult   TOTAL SCORE: (range: 0 - 21) 7     Ohs Peq Phq8    Question 11/12/2024  1:48 PM CST - Filed by Patient   1. Little interest or pleasure in doing things Several days   2. Feeling down, depressed, or hopeless Several days   3. Trouble falling or staying asleep, or sleeping too much Several days   4. Feeling tired or having little energy Several days   5. poor appetite or overeating Several days   6. Feeling bad about yourself - or that you  are a failure or have let yourself or your family down Several days   7. Trouble concentrating on things, such as reading the newspaper or watching television Several days   8. Moving or speaking so slowly that other people could have noticed? Or the opposite - being so fidgety or restless that you have been moving around a lot more than usual Several days   Total Score  (range: 0 - 24) 8     Review action plan  Review Treatment Plan and Values/Aspirations as needed  Rationale for Relaxation, Techniques (PMR, Deep Breathing, Imagery)  Intervention techniques: Relaxation  (Boundaries/Work Life Balance)  Summarize session  Feedback about session    Action Plan:  Review therapy materials  Intervention practice:  · Practice PRM each evening  · Practice deep breathing once per day. Practice it once during a stressful time this week.  · Create personal guided imagery script and practice it once   Ways to overcome obstacles: Ask for help from partner        Treatment plan:  Target symptoms: depression, anxiety , adjustment, work stress  Why chosen therapy is appropriate versus another modality: relevant to diagnosis, patient responds to this modality, evidence based practice  Outcome monitoring methods: self-report, observation, checklist/rating scale  Therapeutic intervention type: insight oriented psychotherapy, behavior modifying psychotherapy, supportive psychotherapy, interactive psychotherapy     Risk parameters:  Patient reports no suicidal ideation  Patient reports no homicidal ideation  Patient reports no self-injurious behavior  Patient reports no violent behavior     Verbal deficits: None     Patient's response to intervention:  The patient's response to intervention is accepting.     Progress toward goals and other mental status changes:  The patient's progress toward goals is good.     Diagnosis:       ICD-10-CM ICD-9-CM   1. MDD (major depressive disorder), single episode, moderate  F32.1 296.22   2. ORESTES  (generalized anxiety disorder)  F41.1 300.02   3. Relationship problem with boyfriend  Z63.0 V62.81         Plan:  individual psychotherapy     Return to clinic: 1 week     Length of Service (minutes): 65

## 2024-11-14 NOTE — PROGRESS NOTES
Individual Psychotherapy (PhD/LCSW)     11/5/2024     Site:  [facility]          Therapeutic Intervention: Met with patient.  Outpatient - Behavior modifying psychotherapy 60 min - CPT code 76233 and Outpatient - Interactive psychotherapy 60 min - CPT code 98117     Chief complaint/reason for encounter: depression, anxiety, work stress, and interpersonal       Interval history and content of current session: Pt presented with bright affect, alert, and oriented for session. Pt has been walking more daily, and still spending more time with her second partner. Pt is more anxious because she's considering going back to work early in one week versus another month. Pt denies any current thoughts of SI/HI or self harming behaviors.     [facility], Session 5 (Behavioral Activation)  Session Focus:  Brief check-in  Set agenda  Collect rating scales _____     Ohs Peq Telepsych Gad7    Question 11/5/2024  2:14 PM CST - Filed by Henrietta Chaidez LPC   Feeling nervous, anxious, on edge Several days   Not being able to stop or control worrying Several days   Worrying too much about different things Several days   Trouble relaxing Several days   Being so restless that its hard to sit still Several days   Becoming easily annoyed or irritable Several days   Feeling afraid as if something awful might happen Several days   If you marked you are experiencing any of the aforementioned problems, how difficult have these made it for you to do your work, take care of things at home, or get along with other people? Very difficult   TOTAL SCORE: (range: 0 - 21) 7     Ohs Peq Phq8    Question 11/5/2024  2:15 PM CST - Filed by Henrietta Chaidez LPC   1. Little interest or pleasure in doing things Several days   2. Feeling down, depressed, or hopeless Several days   3. Trouble falling or staying asleep, or sleeping too much Nearly every day   4. Feeling tired or having little energy Several days   5. poor appetite or overeating  Several days   6. Feeling bad about yourself - or that you are a failure or have let yourself or your family down Several days   7. Trouble concentrating on things, such as reading the newspaper or watching television Several days   8. Moving or speaking so slowly that other people could have noticed? Or the opposite - being so fidgety or restless that you have been moving around a lot more than usual Not at all   Total Score  (range: 0 - 24) 9      Review action plan  Review Treatment Plan and Values/Aspirations as needed  B in CBT, Behavioral Activation, Identifying Activities  Intervention techniques: Behavioral Activation  Summarize session  Feedback about session    Action Plan:  Review therapy materials  Intervention practice:  · Complete the Activity Monitoring and Mood Rating Chart   Ways to overcome obstacles: Ask for help from partner        Treatment plan:  Target symptoms: depression, anxiety , adjustment, work stress  Why chosen therapy is appropriate versus another modality: relevant to diagnosis, patient responds to this modality, evidence based practice  Outcome monitoring methods: self-report, observation, checklist/rating scale  Therapeutic intervention type: insight oriented psychotherapy, behavior modifying psychotherapy, supportive psychotherapy, interactive psychotherapy     Risk parameters:  Patient reports no suicidal ideation  Patient reports no homicidal ideation  Patient reports no self-injurious behavior  Patient reports no violent behavior     Verbal deficits: None     Patient's response to intervention:  The patient's response to intervention is accepting.     Progress toward goals and other mental status changes:  The patient's progress toward goals is fair.     Diagnosis:       ICD-10-CM ICD-9-CM   1. MDD (major depressive disorder), single episode, moderate  F32.1 296.22   2. ORESTES (generalized anxiety disorder)  F41.1 300.02   3. Relationship problem with boyfriend  Z63.0 V62.81          Plan:  individual psychotherapy     Return to clinic: 1 week     Length of Service (minutes): 54

## 2024-12-26 DIAGNOSIS — A59.01 TRICHOMONAS VAGINITIS: Primary | ICD-10-CM

## 2024-12-26 RX ORDER — METRONIDAZOLE 500 MG/1
500 TABLET ORAL EVERY 12 HOURS
Qty: 14 TABLET | Refills: 0 | Status: SHIPPED | OUTPATIENT
Start: 2024-12-26 | End: 2025-01-02

## 2024-12-27 ENCOUNTER — PATIENT MESSAGE (OUTPATIENT)
Dept: PSYCHIATRY | Facility: CLINIC | Age: 34
End: 2024-12-27
Payer: COMMERCIAL

## 2024-12-31 ENCOUNTER — TELEPHONE (OUTPATIENT)
Dept: OBSTETRICS AND GYNECOLOGY | Facility: CLINIC | Age: 34
End: 2024-12-31
Payer: COMMERCIAL

## 2024-12-31 NOTE — TELEPHONE ENCOUNTER
Attempted to contact pt regarding appointment request. I left a voicemail for the pt to return the phone call to the clinic.

## 2024-12-31 NOTE — TELEPHONE ENCOUNTER
Attempted to contact pt regarding scheduling an appointment. I left a voicemail for the pt to return the phone call back to the clinic.

## 2025-01-09 ENCOUNTER — OFFICE VISIT (OUTPATIENT)
Dept: INTERNAL MEDICINE | Facility: CLINIC | Age: 35
End: 2025-01-09
Payer: COMMERCIAL

## 2025-01-09 VITALS
HEART RATE: 98 BPM | SYSTOLIC BLOOD PRESSURE: 112 MMHG | BODY MASS INDEX: 41.38 KG/M2 | DIASTOLIC BLOOD PRESSURE: 76 MMHG | OXYGEN SATURATION: 97 % | HEIGHT: 62 IN | WEIGHT: 224.88 LBS

## 2025-01-09 DIAGNOSIS — R68.89 FORGETFULNESS: ICD-10-CM

## 2025-01-09 DIAGNOSIS — R41.840 ATTENTION OR CONCENTRATION DEFICIT: ICD-10-CM

## 2025-01-09 DIAGNOSIS — Z71.1 FEARED CONDITION NOT DEMONSTRATED: Primary | ICD-10-CM

## 2025-01-09 PROCEDURE — 3008F BODY MASS INDEX DOCD: CPT | Mod: CPTII,S$GLB,, | Performed by: PHYSICIAN ASSISTANT

## 2025-01-09 PROCEDURE — 3078F DIAST BP <80 MM HG: CPT | Mod: CPTII,S$GLB,, | Performed by: PHYSICIAN ASSISTANT

## 2025-01-09 PROCEDURE — 1159F MED LIST DOCD IN RCRD: CPT | Mod: CPTII,S$GLB,, | Performed by: PHYSICIAN ASSISTANT

## 2025-01-09 PROCEDURE — 99999 PR PBB SHADOW E&M-EST. PATIENT-LVL IV: CPT | Mod: PBBFAC,,, | Performed by: PHYSICIAN ASSISTANT

## 2025-01-09 PROCEDURE — 3074F SYST BP LT 130 MM HG: CPT | Mod: CPTII,S$GLB,, | Performed by: PHYSICIAN ASSISTANT

## 2025-01-09 PROCEDURE — 99213 OFFICE O/P EST LOW 20 MIN: CPT | Mod: S$GLB,,, | Performed by: PHYSICIAN ASSISTANT

## 2025-01-09 PROCEDURE — 1160F RVW MEDS BY RX/DR IN RCRD: CPT | Mod: CPTII,S$GLB,, | Performed by: PHYSICIAN ASSISTANT

## 2025-01-09 NOTE — PROGRESS NOTES
"Subjective     Patient ID: Siobhan Chappell is a 34 y.o. female.    Chief Complaint: Cyst    HPI    Established pt of Rene Rasmussen MD       Pt with concern of lump behind left ear, noticed about week ago, today less appreciable  No tenderness or skin changes noted    Also with concern about forgetfulness, short term memory, previously attributed to "mommy brain". Has notice more often the past month, on further interview also may have noticed more after starting zoloft for depression a few months ago. Forgetful affects tasks, often taking notes, overwhelmed    Past Medical History:   Diagnosis Date    Asthma      Social History     Tobacco Use    Smoking status: Never    Smokeless tobacco: Never   Substance Use Topics    Alcohol use: No     Alcohol/week: 0.0 standard drinks of alcohol    Drug use: No     Review of patient's allergies indicates:  No Known Allergies    Review of Systems       Objective  /76 (BP Location: Right arm, Patient Position: Sitting)   Pulse 98   Ht 5' 2" (1.575 m)   Wt 102 kg (224 lb 13.9 oz)   LMP 06/15/2024 (Exact Date)   SpO2 97%   BMI 41.13 kg/m²       Physical Exam  Constitutional:       General: She is not in acute distress.     Appearance: She is not ill-appearing.   HENT:      Head: Normocephalic and atraumatic.      Right Ear: Tympanic membrane, ear canal and external ear normal.      Left Ear: Tympanic membrane, ear canal and external ear normal.   Pulmonary:      Effort: Pulmonary effort is normal. No respiratory distress.   Lymphadenopathy:      Head:      Right side of head: No preauricular or posterior auricular adenopathy.      Left side of head: No preauricular or posterior auricular adenopathy.      Cervical: No cervical adenopathy.   Skin:     Findings: No rash.   Neurological:      Mental Status: She is alert.   Psychiatric:         Attention and Perception: Attention normal.         Mood and Affect: Mood is anxious.         Speech: Speech normal.   "       Behavior: Behavior normal. Behavior is cooperative.         Thought Content: Thought content does not include homicidal or suicidal ideation.            Assessment and Plan     1. Feared condition not demonstrated  No mass or lymphadenopathy noted on exam  Advised pt to f/u if symptoms reoccur    2. Forgetfulness  3. Attention or concentration deficit  Keep upcoming f/u with psychiatry/therapist for further eval      Luna Hernandez PA-C

## (undated) DEVICE — DRESSING AQUACEL SACRAL 8 X 7